# Patient Record
Sex: FEMALE | Race: WHITE | Employment: OTHER | URBAN - METROPOLITAN AREA
[De-identification: names, ages, dates, MRNs, and addresses within clinical notes are randomized per-mention and may not be internally consistent; named-entity substitution may affect disease eponyms.]

---

## 2017-01-12 DIAGNOSIS — I10 HYPERTENSION GOAL BP (BLOOD PRESSURE) < 140/90: ICD-10-CM

## 2017-01-12 DIAGNOSIS — R31.29 MICROSCOPIC HEMATURIA: ICD-10-CM

## 2017-01-12 DIAGNOSIS — E89.0 HISTORY OF PARTIAL THYROIDECTOMY: ICD-10-CM

## 2017-01-12 DIAGNOSIS — R77.1 HYPERGLOBULINEMIA: ICD-10-CM

## 2017-01-12 DIAGNOSIS — I63.50 RIGHT PONTINE CVA (H): ICD-10-CM

## 2017-01-12 DIAGNOSIS — R43.2 DYSGEUSIA: ICD-10-CM

## 2017-01-12 DIAGNOSIS — E03.9 ACQUIRED HYPOTHYROIDISM: ICD-10-CM

## 2017-01-12 DIAGNOSIS — E78.5 HYPERLIPIDEMIA LDL GOAL <100: ICD-10-CM

## 2017-01-12 LAB
ALBUMIN SERPL-MCNC: 3.5 G/DL (ref 3.4–5)
ALBUMIN UR-MCNC: NEGATIVE MG/DL
ALP SERPL-CCNC: 102 U/L (ref 40–150)
ALT SERPL W P-5'-P-CCNC: 23 U/L (ref 0–50)
ANION GAP SERPL CALCULATED.3IONS-SCNC: 9 MMOL/L (ref 3–14)
APPEARANCE UR: CLEAR
AST SERPL W P-5'-P-CCNC: 16 U/L (ref 0–45)
BASOPHILS # BLD AUTO: 0 10E9/L (ref 0–0.2)
BASOPHILS NFR BLD AUTO: 0.3 %
BILIRUB SERPL-MCNC: 0.4 MG/DL (ref 0.2–1.3)
BILIRUB UR QL STRIP: NEGATIVE
BUN SERPL-MCNC: 15 MG/DL (ref 7–30)
CALCIUM SERPL-MCNC: 9.5 MG/DL (ref 8.5–10.1)
CHLORIDE SERPL-SCNC: 107 MMOL/L (ref 94–109)
CHOLEST SERPL-MCNC: 156 MG/DL
CO2 SERPL-SCNC: 26 MMOL/L (ref 20–32)
COLOR UR AUTO: YELLOW
CREAT SERPL-MCNC: 0.69 MG/DL (ref 0.52–1.04)
CREAT UR-MCNC: 147 MG/DL
DIFFERENTIAL METHOD BLD: ABNORMAL
EOSINOPHIL # BLD AUTO: 0.1 10E9/L (ref 0–0.7)
EOSINOPHIL NFR BLD AUTO: 1.4 %
ERYTHROCYTE [DISTWIDTH] IN BLOOD BY AUTOMATED COUNT: 15.1 % (ref 10–15)
GFR SERPL CREATININE-BSD FRML MDRD: 82 ML/MIN/1.7M2
GLUCOSE SERPL-MCNC: 79 MG/DL (ref 70–99)
GLUCOSE UR STRIP-MCNC: NEGATIVE MG/DL
HCT VFR BLD AUTO: 45.1 % (ref 35–47)
HDLC SERPL-MCNC: 71 MG/DL
HGB BLD-MCNC: 14 G/DL (ref 11.7–15.7)
HGB UR QL STRIP: ABNORMAL
KETONES UR STRIP-MCNC: NEGATIVE MG/DL
LDLC SERPL CALC-MCNC: 74 MG/DL
LEUKOCYTE ESTERASE UR QL STRIP: NEGATIVE
LYMPHOCYTES # BLD AUTO: 1.2 10E9/L (ref 0.8–5.3)
LYMPHOCYTES NFR BLD AUTO: 34.7 %
MCH RBC QN AUTO: 28.6 PG (ref 26.5–33)
MCHC RBC AUTO-ENTMCNC: 31 G/DL (ref 31.5–36.5)
MCV RBC AUTO: 92 FL (ref 78–100)
MICROALBUMIN UR-MCNC: 18 MG/L
MICROALBUMIN/CREAT UR: 12.04 MG/G CR (ref 0–25)
MONOCYTES # BLD AUTO: 0.3 10E9/L (ref 0–1.3)
MONOCYTES NFR BLD AUTO: 8.5 %
NEUTROPHILS # BLD AUTO: 2 10E9/L (ref 1.6–8.3)
NEUTROPHILS NFR BLD AUTO: 55.1 %
NITRATE UR QL: NEGATIVE
NONHDLC SERPL-MCNC: 85 MG/DL
PH UR STRIP: 5.5 PH (ref 5–7)
PLATELET # BLD AUTO: 304 10E9/L (ref 150–450)
POTASSIUM SERPL-SCNC: 4.1 MMOL/L (ref 3.4–5.3)
PROT SERPL-MCNC: 8 G/DL (ref 6.8–8.8)
RBC # BLD AUTO: 4.9 10E12/L (ref 3.8–5.2)
RBC #/AREA URNS AUTO: ABNORMAL /HPF (ref 0–2)
SODIUM SERPL-SCNC: 142 MMOL/L (ref 133–144)
SP GR UR STRIP: >1.03 (ref 1–1.03)
TRIGL SERPL-MCNC: 56 MG/DL
TSH SERPL DL<=0.005 MIU/L-ACNC: 1.72 MU/L (ref 0.4–4)
URN SPEC COLLECT METH UR: ABNORMAL
UROBILINOGEN UR STRIP-ACNC: 0.2 EU/DL (ref 0.2–1)
WBC # BLD AUTO: 3.5 10E9/L (ref 4–11)
WBC #/AREA URNS AUTO: ABNORMAL /HPF (ref 0–2)

## 2017-01-12 PROCEDURE — 36415 COLL VENOUS BLD VENIPUNCTURE: CPT | Performed by: INTERNAL MEDICINE

## 2017-01-12 PROCEDURE — 82043 UR ALBUMIN QUANTITATIVE: CPT | Performed by: INTERNAL MEDICINE

## 2017-01-12 PROCEDURE — 80061 LIPID PANEL: CPT | Performed by: INTERNAL MEDICINE

## 2017-01-12 PROCEDURE — 80050 GENERAL HEALTH PANEL: CPT | Performed by: INTERNAL MEDICINE

## 2017-01-12 PROCEDURE — 81001 URINALYSIS AUTO W/SCOPE: CPT | Performed by: INTERNAL MEDICINE

## 2017-01-27 ENCOUNTER — OFFICE VISIT (OUTPATIENT)
Dept: FAMILY MEDICINE | Facility: CLINIC | Age: 77
End: 2017-01-27
Payer: COMMERCIAL

## 2017-01-27 DIAGNOSIS — R31.29 MICROSCOPIC HEMATURIA: ICD-10-CM

## 2017-01-27 DIAGNOSIS — N32.81 OAB (OVERACTIVE BLADDER): Primary | ICD-10-CM

## 2017-01-27 DIAGNOSIS — G31.84 MCI (MILD COGNITIVE IMPAIRMENT): ICD-10-CM

## 2017-01-27 DIAGNOSIS — G47.00 PERSISTENT INSOMNIA: ICD-10-CM

## 2017-01-27 PROCEDURE — 99214 OFFICE O/P EST MOD 30 MIN: CPT | Performed by: INTERNAL MEDICINE

## 2017-01-27 RX ORDER — OXYBUTYNIN CHLORIDE 10 MG/1
10 TABLET, EXTENDED RELEASE ORAL DAILY
Qty: 30 TABLET | Refills: 1 | Status: SHIPPED | OUTPATIENT
Start: 2017-01-27 | End: 2017-02-09

## 2017-01-27 NOTE — MR AVS SNAPSHOT
After Visit Summary   1/27/2017    Tana Caceres    MRN: [id number]           Patient Information     Date Of Birth          1940        Visit Information        Provider Department      1/27/2017 11:10 AM Manuel Taylor MD [facility]        Today's Diagnoses     OAB (overactive bladder)    -  1     Microscopic hematuria         Persistent insomnia         MCI (mild cognitive impairment)           Care Instructions    [facility]    If you have any questions regarding to your visit please contact your care team:       Team Red:   Clinic Hours Telephone Number   Dr. Maye Garzon, NP   7am-7pm  Monday - Thursday   7am-5pm  Fridays  [phone]  (Appointment scheduling available 24/7)    Questions about your visit?   Team Line  [phone]   Urgent Care - [place] and [facility] - 11am-9pm Monday-Friday Saturday-Sunday- 9am-5pm   [place] - 5pm-9pm Monday-Friday Saturday-Sunday- 9am-5pm  [phone] - [facility]  [phone] - [place]       What options do I have for visits at the clinic other than the traditional office visit?  To expand how we care for you, many of our providers are utilizing electronic visits (e-visits) and telephone visits, when medically appropriate, for interactions with their patients rather than a visit in the clinic.   We also offer nurse visits for many medical concerns. Just like any other service, we will bill your insurance company for this type of visit based on time spent on the phone with your provider. Not all insurance companies cover these visits. Please check with your medical insurance if this type of visit is covered. You will be responsible for any charges that are not paid by your insurance.      E-visits via Cenify:  generally incur a $35.00 fee.  Telephone visits:  Time spent on the phone: *charged based on time that is spent on the phone in increments of 10  minutes. Estimated cost:   5-10 mins $30.00   11-20 mins. $59.00   21-30 mins. $85.00     Use iMapDatahart (secure email communication and access to your chart) to send your primary care provider a message or make an appointment. Ask someone on your Team how to sign up for iCrackedt.  For a Price Quote for your services, please call our [facility] at [phone].      As always, Thank you for trusting us with your health care needs!  Wilmer Min          Follow-ups after your visit        Future tests that were ordered for you today     Open Future Orders        Priority Expected Expires Ordered    US Renal Complete Routine  1/27/2018 1/27/2017            Who to contact     If you have questions or need follow up information about today's clinic visit or your schedule please contact [facility] directly at [phone].  Normal or non-critical lab and imaging results will be communicated to you by iMapDatahart, letter or phone within 4 business days after the clinic has received the results. If you do not hear from us within 7 days, please contact the clinic through iCrackedt or phone. If you have a critical or abnormal lab result, we will notify you by phone as soon as possible.  Submit refill requests through Ubidyne or call your pharmacy and they will forward the refill request to us. Please allow 3 business days for your refill to be completed.          Additional Information About Your Visit        iMapDatahart Information     iCrackedt gives you secure access to your electronic health record. If you see a primary care provider, you can also send messages to your care team and make appointments. If you have questions, please call your primary care clinic.  If you do not have a primary care provider, please call [phone] and they will assist you.        Care EveryWhere ID     This is your Care EveryWhere ID. This could be used by other organizations to access your [facility]  records  [id number]         Blood Pressure from Last 3 Encounters:   12/22/16 112/70   11/17/16 153/86   04/09/15 121/77    Weight from Last 3 Encounters:   12/22/16 175 lb (79.379 kg)   11/17/16 175 lb (79.379 kg)   04/09/15 147 lb (66.679 kg)                 Today's Medication Changes          These changes are accurate as of: 1/27/17 11:57 AM.  If you have any questions, ask your nurse or doctor.               Start taking these medicines.        Dose/Directions    oxybutynin 10 MG 24 hr tablet   Commonly known as:  DITROPAN XL   Used for:  OAB (overactive bladder)   Started by:  Manuel Taylor MD        Dose:  10 mg   Take 1 tablet (10 mg) by mouth daily   Quantity:  30 tablet   Refills:  1            Where to get your medicines      These medications were sent to [id number] - [place] [place] [address]     Phone:  [phone]    - oxybutynin 10 MG 24 hr tablet             Primary Care Provider Office Phone # Fax #    [facility] [phone]       [address] [facility] [address]        Thank you!     Thank you for choosing [facility]  for your care. Our goal is always to provide you with excellent care. Hearing back from our patients is one way we can continue to improve our services. Please take a few minutes to complete the written survey that you may receive in the mail after your visit with us. Thank you!             Your Updated Medication List - Protect others around you: Learn how to safely use, store and throw away your medicines at www.disposemymeds.org.          This list is accurate as of: 1/27/17 11:57 AM.  Always use your most recent med list.                   Brand Name Dispense Instructions for use    * ACETAMINOPHEN PO      Take 500 mg by mouth every 6 hours as needed for pain       * acetaminophen 325 MG tablet    TYLENOL    100 tablet    Take 2 tablets (650 mg) by mouth every 6 hours as  needed for mild pain       amLODIPine 5 MG tablet    NORVASC     Take 5 mg by mouth       aspirin 81 MG tablet     30 tablet    Take 1 tablet (81 mg) by mouth daily       atorvastatin 20 MG tablet    LIPITOR     Take 20 mg by mouth       cholecalciferol 1000 UNIT tablet    vitamin D    90 tablet    Take 1 tablet (1,000 Units) by mouth daily       ENALAPRIL MALEATE PO      Take 20 mg by mouth       LEVOTHYROXINE SODIUM PO      Take 75 mcg by mouth daily       * order for DME     1 Device    Equipment being ordered: shower chair, with hand placement on seat       * order for DME     1 Device    Equipment being ordered: wall bars with screws, no suction       oxybutynin 10 MG 24 hr tablet    DITROPAN XL    30 tablet    Take 1 tablet (10 mg) by mouth daily       piroxicam 20 MG capsule    FELDENE    30 capsule    Take 1 capsule (20 mg) by mouth daily (with breakfast)       polyethylene glycol powder    MIRALAX    510 g    Take 17 g (1 capful) by mouth daily       WARFARIN SODIUM PO      Take 4 mg by mouth       * Notice:  This list has 4 medication(s) that are the same as other medications prescribed for you. Read the directions carefully, and ask your doctor or other care provider to review them with you.

## 2017-01-27 NOTE — PROGRESS NOTES
SUBJECTIVE:                                                    Tana Caceres is a 76 year old female who presents to clinic today for the following health issues:    Chief Complaint   Patient presents with     Labs Only     follow up for labs         OAB (overactive bladder)  Microscopic hematuria  Persistent insomnia  MCI (mild cognitive impairment)     Tana Caceres is a very pleasant elderly woman whom I have met and become a primary care physician for. She's generally in ok health. She's got some chronic health issues but these are fairly well managed. For complete details please see most recent previous office visit with me and review problem list which has been properly expanded with explanations of her known health problems . We also did comprehensive baseline laboratory studies last week and she is in today with somnolent to go over the results.    Results for orders placed or performed in visit on 01/12/17   CBC with platelets differential   Result Value Ref Range    WBC 3.5 (L) 4.0 - 11.0 10e9/L    RBC Count 4.90 3.8 - 5.2 10e12/L    Hemoglobin 14.0 11.7 - 15.7 g/dL    Hematocrit 45.1 35.0 - 47.0 %    MCV 92 78 - 100 fl    MCH 28.6 26.5 - 33.0 pg    MCHC 31.0 (L) 31.5 - 36.5 g/dL    RDW 15.1 (H) 10.0 - 15.0 %    Platelet Count 304 150 - 450 10e9/L    Diff Method Automated Method     % Neutrophils 55.1 %    % Lymphocytes 34.7 %    % Monocytes 8.5 %    % Eosinophils 1.4 %    % Basophils 0.3 %    Absolute Neutrophil 2.0 1.6 - 8.3 10e9/L    Absolute Lymphocytes 1.2 0.8 - 5.3 10e9/L    Absolute Monocytes 0.3 0.0 - 1.3 10e9/L    Absolute Eosinophils 0.1 0.0 - 0.7 10e9/L    Absolute Basophils 0.0 0.0 - 0.2 10e9/L   UA with Microscopic reflex to Culture   Result Value Ref Range    Color Urine Yellow     Appearance Urine Clear     Glucose Urine Negative NEG mg/dL    Bilirubin Urine Negative NEG    Ketones Urine Negative NEG mg/dL    Specific Gravity Urine >1.030 1.003 - 1.035    pH Urine 5.5 5.0 - 7.0 pH    Protein  Albumin Urine Negative NEG mg/dL    Urobilinogen Urine 0.2 0.2 - 1.0 EU/dL    Nitrite Urine Negative NEG    Blood Urine Moderate (A) NEG    Leukocyte Esterase Urine Negative NEG    Source Midstream Urine     WBC Urine O - 2 0 - 2 /HPF    RBC Urine 25-50 (A) 0 - 2 /HPF   Albumin Random Urine Quantitative   Result Value Ref Range    Creatinine Urine 147 mg/dL    Albumin Urine mg/L 18 mg/L    Albumin Urine mg/g Cr 12.04 0 - 25 mg/g Cr   Comprehensive metabolic panel   Result Value Ref Range    Sodium 142 133 - 144 mmol/L    Potassium 4.1 3.4 - 5.3 mmol/L    Chloride 107 94 - 109 mmol/L    Carbon Dioxide 26 20 - 32 mmol/L    Anion Gap 9 3 - 14 mmol/L    Glucose 79 70 - 99 mg/dL    Urea Nitrogen 15 7 - 30 mg/dL    Creatinine 0.69 0.52 - 1.04 mg/dL    GFR Estimate 82 >60 mL/min/1.7m2    GFR Estimate If Black >90   GFR Calc   >60 mL/min/1.7m2    Calcium 9.5 8.5 - 10.1 mg/dL    Bilirubin Total 0.4 0.2 - 1.3 mg/dL    Albumin 3.5 3.4 - 5.0 g/dL    Protein Total 8.0 6.8 - 8.8 g/dL    Alkaline Phosphatase 102 40 - 150 U/L    ALT 23 0 - 50 U/L    AST 16 0 - 45 U/L   Lipid panel reflex to direct LDL   Result Value Ref Range    Cholesterol 156 <200 mg/dL    Triglycerides 56 <150 mg/dL    HDL Cholesterol 71 >49 mg/dL    LDL Cholesterol Calculated 74 <100 mg/dL    Non HDL Cholesterol 85 <130 mg/dL   TSH with free T4 reflex   Result Value Ref Range    TSH 1.72 0.40 - 4.00 mU/L         2-3 events per night of nocturia   Wt Readings from Last 5 Encounters:   12/22/16 175 lb (79.379 kg)   11/17/16 175 lb (79.379 kg)   04/09/15 147 lb (66.679 kg)   12/11/14 165 lb (74.844 kg)   11/04/14 160 lb (72.576 kg)     We reviewed all her laboratory studies in significant detail  And there's really no significant issues here. A few nonspecific trivial variations from the normal ranges such as a white blood cell count of 3.5 and microscopic hematuria.     We discussed all issues with hiro who translates to the Romansh for me.  The main issues are as follows;    She is having some urinary incontinence , urinary urgency is an issue. This has been about a year and maybe a bit worsened. It's urinary urgency and some urinary incontinence . She's getting up 2-3 times at night in order to urinate. It's not diabetes mellitus and no pain with this. Sometimes while awake she can tell , the urge to urinate is coming on too fast to be normal and if she doesn't get to the bathroom in time she will have a urine accident.    Then she is status post one kidney from a nephrectomy , unilateral.     Problem list and histories reviewed & adjusted, as indicated.  Additional history: as documented    Patient Active Problem List   Diagnosis     Hypertension goal BP (blood pressure) < 140/90     DVT (deep venous thrombosis) (H)     Acquired hypothyroidism     PE (pulmonary embolism)     Factor V Leiden (H)     Unilateral sensorineural hearing loss     Mixed hearing loss     Osteoporosis     History of nephrectomy     Adrenal mass, left (H)     Right pontine CVA (H)     DO (dyspnea on exertion)     Primary osteoarthritis of knee     Morbid obesity (H)     Physical deconditioning     Dysgeusia     Cataract     LAP-BAND surgery status     Left adrenal mass (H)     Age-related osteoporosis without current pathological fracture     Hyperlipidemia LDL goal <100     Long term current use of anticoagulant therapy     Hyperglobulinemia     History of partial thyroidectomy     Microscopic hematuria     Persistent insomnia     MCI (mild cognitive impairment)     Past Surgical History   Procedure Laterality Date     Surgical history of -        R ear surgery     Cataract iol, rt/lt  8/14/2014     Procedure: EXTRACTION, CATARACT, WITH INTRAOCULAR LENS INSERTION, POSTERIOR CHAMBER; Laterality: Right; Surgeon: Eufemia Chatman MD; Service: Ophthalmology     Tympanoplasty  10/14/2014     Procedure: TYMPANOPLASTY; Laterality: Right; Surgeon: Stephen Abarca MD; Service:  Otolaryngology     Cataract iol, rt/lt  11/10/2014     Procedure: EXTRACTION, CATARACT, WITH INTRAOCULAR LENS INSERTION, POSTERIOR CHAMBER; Laterality: Left; Surgeon: Gulshan Pickering MD; Service: Ophthalmology     C partial excision thyroid,bilat       Nephrectomy  1980s     Xr lap band       uncertain       Social History   Substance Use Topics     Smoking status: Never Smoker      Smokeless tobacco: Never Used     Alcohol Use: No     Family History   Problem Relation Age of Onset     Hypertension Father      Hypertension Mother      Ovarian Cancer No family hx of      Cancer - colorectal No family hx of      Breast Cancer No family hx of      Hypertension Brother      Glaucoma Father      Glaucoma Brother          Current Outpatient Prescriptions   Medication Sig Dispense Refill     oxybutynin (DITROPAN XL) 10 MG 24 hr tablet Take 1 tablet (10 mg) by mouth daily 30 tablet 1     cholecalciferol (VITAMIN D) 1000 UNIT tablet Take 1 tablet (1,000 Units) by mouth daily 90 tablet 3     amLODIPine (NORVASC) 5 MG tablet Take 5 mg by mouth       atorvastatin (LIPITOR) 20 MG tablet Take 20 mg by mouth       aspirin 81 MG tablet Take 1 tablet (81 mg) by mouth daily 30 tablet 11     order for DME Equipment being ordered: shower chair, with hand placement on seat 1 Device 0     order for DME Equipment being ordered: wall bars with screws, no suction 1 Device 0     polyethylene glycol (MIRALAX) powder Take 17 g (1 capful) by mouth daily 510 g 1     piroxicam (FELDENE) 20 MG capsule Take 1 capsule (20 mg) by mouth daily (with breakfast) 30 capsule 1     LEVOTHYROXINE SODIUM PO Take 75 mcg by mouth daily       ENALAPRIL MALEATE PO Take 20 mg by mouth       ACETAMINOPHEN PO Take 500 mg by mouth every 6 hours as needed for pain       acetaminophen (TYLENOL) 325 MG tablet Take 2 tablets (650 mg) by mouth every 6 hours as needed for mild pain 100 tablet 1     WARFARIN SODIUM PO Take 4 mg by mouth        No Known Allergies  Recent  Labs   Lab Test  01/12/17   1240 02/09/15   LDL  74   --    HDL  71   --    TRIG  56   --    ALT  23   --    CR  0.69  0.64   GFRESTIMATED  82  96   GFRESTBLACK  >90   GFR Calc    116   POTASSIUM  4.1  5.0   TSH  1.72   --       BP Readings from Last 3 Encounters:   12/22/16 112/70   11/17/16 153/86   04/09/15 121/77    Wt Readings from Last 3 Encounters:   12/22/16 175 lb (79.379 kg)   11/17/16 175 lb (79.379 kg)   04/09/15 147 lb (66.679 kg)                  Labs reviewed in EPIC  Problem list, Medication list, Allergies, and Medical/Social/Surgical histories [facility] in [facility] and updated as appropriate.    ROS:  Constitutional, HEENT, cardiovascular, pulmonary, gi and gu systems are negative, except as otherwise noted.    OBJECTIVE:                                                    There were no vitals taken for this visit.  There is no weight on file to calculate BMI.  GENERAL APPEARANCE: healthy, alert and no distress  EYES: Eyes grossly normal to inspection, PERRL and conjunctivae and sclerae normal  RESP: lungs clear to auscultation - no rales, rhonchi or wheezes  CV: regular rates and rhythm, normal S1 S2, no S3 or S4 and no murmur, click or rub    Diagnostic test results:  Diagnostic Test Results::    As detailed above        ASSESSMENT/PLAN:                                                    1. OAB (overactive bladder)  My diagnosis is a clinical diagnosis based on history. There's enough evidence here to warrant an empirical trial of oxybutnin . Lets give it a solid 2 weeks and dependent upon her response top treatment we may try increasing the dose  - US Renal Complete; Future  - oxybutynin (DITROPAN XL) 10 MG 24 hr tablet; Take 1 tablet (10 mg) by mouth daily  Dispense: 30 tablet; Refill: 1    2. Microscopic hematuria  Although the evidence of a true pathological process is not overly convincing , I am worried enough to want to exclude bladder / kidney issues   - US Renal Complete;  Future    3. Persistent insomnia  They have tried Melatonin between 3 to 5 milligrams one hour before sleep without success. It may be that the urination is playing a major role here so lets wait and see if this helps things, if not we can try adding something like Desyrel (Trazodone)     4. MCI (mild cognitive impairment)  We had a significant conversation here. Patient does not have evidence of a dementia although I did not apply something like a formal [employer] Mental Status (UMS) Examination or 6 CIT dementia screen in Yoruba, her symptoms are more consistent with a mild cognitive impairment. This is something they are currently comfortable just to keep an eye on things       Follow up with Provider - 1-3 months      Manuel Taylor MD  [facility]    25 minutes was spent with the patient with greater than 50% in face-to-face discussion of disease process, treatment options and medicine management.

## 2017-01-27 NOTE — NURSING NOTE
"Chief Complaint   Patient presents with     Labs Only     follow up for labs        Initial There were no vitals taken for this visit. Estimated body mass index is 37.75 kg/(m^2) as calculated from the following:    Height as of 12/22/16: 4' 9.09\" (1.45 m).    Weight as of 12/22/16: 175 lb (79.379 kg).  BP completed using cuff size: vviiane Min      "

## 2017-01-27 NOTE — PATIENT INSTRUCTIONS
[facility]    If you have any questions regarding to your visit please contact your care team:       Team Red:   Clinic Hours Telephone Number   Dr. Maye Garzon, NP   7am-7pm  Monday - Thursday   7am-5pm  Fridays  [phone]  (Appointment scheduling available 24/7)    Questions about your visit?   Team Line  [phone]   Urgent Care - [place] - 11am-9pm Monday-Friday Saturday-Sunday- 9am-5pm   [place] - 5pm-9pm Monday-Friday Saturday-Sunday- 9am-5pm  [phone] - [place]   [phone] - [place]       What options do I have for visits at the clinic other than the traditional office visit?  To expand how we care for you, many of our providers are utilizing electronic visits (e-visits) and telephone visits, when medically appropriate, for interactions with their patients rather than a visit in the clinic.   We also offer nurse visits for many medical concerns. Just like any other service, we will bill your insurance company for this type of visit based on time spent on the phone with your provider. Not all insurance companies cover these visits. Please check with your medical insurance if this type of visit is covered. You will be responsible for any charges that are not paid by your insurance.      E-visits via Mevio:  generally incur a $35.00 fee.  Telephone visits:  Time spent on the phone: *charged based on time that is spent on the phone in increments of 10 minutes. Estimated cost:   5-10 mins $30.00   11-20 mins. $59.00   21-30 mins. $85.00     Use [email or web address] (secure email communication and access to your chart) to send your primary care provider a message or make an appointment. Ask someone on your Team how to sign up for Mevio.  For a Price Quote for your services, please call our Consumer Price Line at [phone].      As always, Thank you for trusting us with your health care needs!  Wilmer ESPINOZA  [place]

## 2017-02-03 ENCOUNTER — TELEPHONE (OUTPATIENT)
Dept: FAMILY MEDICINE | Facility: CLINIC | Age: 77
End: 2017-02-03

## 2017-02-03 DIAGNOSIS — I82.4Y9 DEEP VEIN THROMBOSIS (DVT) OF PROXIMAL LOWER EXTREMITY, UNSPECIFIED CHRONICITY, UNSPECIFIED LATERALITY (H): Primary | ICD-10-CM

## 2017-02-03 DIAGNOSIS — I27.82 OTHER CHRONIC PULMONARY EMBOLISM WITHOUT ACUTE COR PULMONALE (H): ICD-10-CM

## 2017-02-03 RX ORDER — WARFARIN SODIUM 5 MG/1
TABLET ORAL
COMMUNITY
Start: 2017-02-03 | End: 2017-07-06

## 2017-02-03 NOTE — TELEPHONE ENCOUNTER
HC nurse updated.      FYI:   Per HC nurse, patient is currently on warfarin 7.5mg on Sun and Wed, and 5mg ROW  Last INR was 2.3 on 1/23/17      Bennett Shaw RN

## 2017-02-03 NOTE — TELEPHONE ENCOUNTER
Reason for Call:  Other prescription and Order     Detailed comments: Alaina calling in INR: 2.5. Needing orders for dosing and next recheck date. Verbal order ok    Phone Number Patient can be reached at: Other phone number:  [phone]  *    Best Time: any time    Can we leave a detailed message on this number? YES    Call taken on 2/3/2017 at 3:54 PM by Katelin Sher

## 2017-02-03 NOTE — TELEPHONE ENCOUNTER
Continue same coumadin dose [ 7.5 Sunday, Wednesday , 5 milligram other days ]   Recheck in one month   Inr clinic referral     Manuel Taylor MD

## 2017-02-06 ENCOUNTER — RADIANT APPOINTMENT (OUTPATIENT)
Dept: ULTRASOUND IMAGING | Facility: CLINIC | Age: 77
End: 2017-02-06
Attending: INTERNAL MEDICINE
Payer: COMMERCIAL

## 2017-02-06 DIAGNOSIS — R31.29 MICROSCOPIC HEMATURIA: ICD-10-CM

## 2017-02-06 DIAGNOSIS — N32.81 OAB (OVERACTIVE BLADDER): ICD-10-CM

## 2017-02-06 PROCEDURE — 76770 US EXAM ABDO BACK WALL COMP: CPT

## 2017-02-08 ENCOUNTER — TELEPHONE (OUTPATIENT)
Dept: FAMILY MEDICINE | Facility: CLINIC | Age: 77
End: 2017-02-08

## 2017-02-08 DIAGNOSIS — N32.81 OAB (OVERACTIVE BLADDER): Primary | ICD-10-CM

## 2017-02-08 NOTE — TELEPHONE ENCOUNTER
Patient started oxybutynin (DITROPAN XL) 10 MG one tablet daily about 10 days ago. She has noticed a small improvement in symptom, but were hoping for more of a change. Patient is tolerating the medication well just wished it helped more. Is it possible to increase the medication or maybe change the medication? Please call to discuss. Patient uses the [facility] pharmacy in [place]./ Benita Godinez MA

## 2017-02-09 RX ORDER — OXYBUTYNIN CHLORIDE 10 MG/1
20 TABLET, EXTENDED RELEASE ORAL DAILY
Qty: 60 TABLET | Refills: 1 | Status: SHIPPED | OUTPATIENT
Start: 2017-02-09 | End: 2017-04-27

## 2017-02-09 NOTE — TELEPHONE ENCOUNTER
Prescription sent.    Left message on voicemail for patient to return call to RN hotline at # [phone].  Bennett Shaw RN

## 2017-02-09 NOTE — TELEPHONE ENCOUNTER
Yes, we can increase the medication. She can double up on the current 10 milligram dose and if after a week or two at this level we still have less then satisfactory success we can go to the 30 milligram which is the highest possible dose.    Manuel Taylor MD

## 2017-02-10 NOTE — TELEPHONE ENCOUNTER
Patient's son called back stating that he was the one that had this discussion with the MA.  He stated that med was effective but problem had not resolved.    Explained to him that dose can be increased per provider and new script was sent to the pharmacy  He verbalized understanding and will patient with the new dose.   Will see how patient does on increased dose      Bennett Shaw RN

## 2017-03-03 ENCOUNTER — TELEPHONE (OUTPATIENT)
Dept: FAMILY MEDICINE | Facility: CLINIC | Age: 77
End: 2017-03-03

## 2017-03-03 NOTE — TELEPHONE ENCOUNTER
Per 2/3/17 phone encounter    Patient was to continue current dose and recheck in 1 month.  She was taking warfarin 7.5mg on Sun and Wed, and 5mg ROW    Bennett Shaw RN

## 2017-03-03 NOTE — TELEPHONE ENCOUNTER
1. Patient does have an INR referral.  2. Patient will take 7.5mg on Sun, and 5mg ROW, recheck INR in 10 days (3/13/17)    Alaina updated with orders above      Bennett Shaw RN

## 2017-03-03 NOTE — TELEPHONE ENCOUNTER
Reason for Call:  INR    Who is calling?  Nursing Home: [facility]    Phone number:  [phone]    Fax number:      Name of caller: Alaina    INR Value:  3.8    Are there any other concerns:  Yes: Too high and patient had epistasis     Can we leave a detailed message on this number? YES    Phone number patient can be reached at: Other phone number:  [phone]      Call taken on 3/3/2017 at 4:36 PM by Marisabel Mcclendon

## 2017-03-03 NOTE — TELEPHONE ENCOUNTER
1. Make sure she does have a coherent INR plan of care ! If necessary we can place inr clinic enrollment orders  2. For current INR dosing , based on INR of 3.8 and current dosing, lets change to 5 milligrams 6/7 days 7.5 1 days per week and recheck in 10 days     Manuel Taylor MD

## 2017-03-13 ENCOUNTER — TELEPHONE (OUTPATIENT)
Dept: FAMILY MEDICINE | Facility: CLINIC | Age: 77
End: 2017-03-13

## 2017-03-13 ENCOUNTER — ANTICOAGULATION THERAPY VISIT (OUTPATIENT)
Dept: NURSING | Facility: CLINIC | Age: 77
End: 2017-03-13
Payer: COMMERCIAL

## 2017-03-13 DIAGNOSIS — I26.99 PULMONARY EMBOLISM WITH INFARCTION (H): ICD-10-CM

## 2017-03-13 DIAGNOSIS — Z79.01 LONG-TERM (CURRENT) USE OF ANTICOAGULANTS: ICD-10-CM

## 2017-03-13 LAB — INR PPP: 3.6

## 2017-03-13 PROCEDURE — 99207 ZZC NO CHARGE NURSE ONLY: CPT | Performed by: INTERNAL MEDICINE

## 2017-03-13 NOTE — MR AVS SNAPSHOT
Tana Ramosherb   3/13/2017   Anticoagulation Therapy Visit    Description:  76 year old female   Provider:  Manuel Taylor MD   Department:  [facility] Nurse           INR as of 3/13/2017     Today's INR 3.6!      Anticoagulation Summary as of 3/13/2017     INR goal 2.0-3.0   Today's INR 3.6!   Full instructions 3/13: 2.5 mg; 3/14: 5 mg; 3/15: 5 mg; 3/16: 5 mg; 3/17: 5 mg; 3/18: 5 mg; 3/19: 5 mg; Otherwise No maintenance plan   Next INR check 3/20/2017    Indications   DVT (deep venous thrombosis) (H) [I82.409]  Long-term (current) use of anticoagulants [Z79.01] [Z79.01]  Pulmonary embolism with infarction (H) [I26.99] [I26.99]         Contact Numbers     [facility]  Please call [phone] to cancel and/or reschedule your appointment   Please call [phone] with any problems or questions regarding your therapy.        March 2017 Details    Sun Mon Tue Wed Thu Fri Sat        1               2               3               4                 5               6               7               8               9               10               11                 12               13      2.5 mg   See details      14      5 mg         15      5 mg         16      5 mg         17      5 mg         18      5 mg           19      5 mg         20            21               22               23               24               25                 26               27               28               29               30               31                 Date Details   03/13 This INR check       Date of next INR:  3/20/2017         How to take your warfarin dose     To take:  2.5 mg Take 0.5 of a 5 mg tablet.    To take:  5 mg Take 1 of the 5 mg tablets.

## 2017-03-13 NOTE — PROGRESS NOTES
ANTICOAGULATION FOLLOW-UP CLINIC VISIT    Patient Name:  Tana Caceres  Date:  3/13/2017  Contact Type:  Telephone/ Keerthi    SUBJECTIVE:     Patient Findings     Positives Unexplained INR or factor level change    Comments S/O:  Keerthi from [facility] calls with patients INR today of 3.6.  Tana Caceres is on Coumadin for DVT.  Current Coumadin dose is 7.5 mg on Sundays and 5 mg ROW, weekly total: 37.5mg.   Concerns today are: None Noted.    A/P: Tana Caceres's INR is Supratherapeutic  for his/her goal range of 2 - 3.  Reasons why INR is out of range may include:unknown  Recommended to have Tana Caceres decrease Coumadin dose to 2.5mg Mondays and 5 mg ROW, weekly total: 32.5mg and to have his/her INR rechecked in 1 week on 3/20.      Marina Mohr RN - BC               OBJECTIVE    INR   Date Value Ref Range Status   03/13/2017 3.6  Final       ASSESSMENT / PLAN  INR assessment SUPRA    Recheck INR In: 1 WEEK    INR Location [facility]      Anticoagulation Summary as of 3/13/2017     INR goal 2.0-3.0   Today's INR 3.6!   Maintenance plan No maintenance plan   Full instructions 3/13: 2.5 mg; 3/14: 5 mg; 3/15: 5 mg; 3/16: 5 mg; 3/17: 5 mg; 3/18: 5 mg; 3/19: 5 mg; Otherwise No maintenance plan   Next INR check 3/20/2017   Target end date Indefinite    Indications   DVT (deep venous thrombosis) (H) [I82.409]  Long-term (current) use of anticoagulants [Z79.01] [Z79.01]  Pulmonary embolism with infarction (H) [I26.99] [I26.99]         Anticoagulation Episode Summary     INR check location     Preferred lab     Send INR reminders to [facility] CLINIC    Comments       Anticoagulation Care Providers     Provider Role Specialty Phone number    Manuel Taylor MD [facility] Internal Medicine [phone]            See the Encounter Report to view Anticoagulation Flowsheet and Dosing Calendar (Go to Encounters tab in chart review, and find the Anticoagulation Therapy Visit)    Dosage adjustment made based on  physician directed care plan.    Marina Mohr RN

## 2017-03-13 NOTE — TELEPHONE ENCOUNTER
Keerthi calling in an inr today.  3.6 today. Her dosage is 7.5 mg Sunday and 5 mg all the other days.

## 2017-03-13 NOTE — TELEPHONE ENCOUNTER
Patient had been referred to INR clinic.  Routing to INR clinic  See 3/3/17 phone encounter for previous INR results and dosing  Bennett Shaw RN

## 2017-03-20 ENCOUNTER — ANTICOAGULATION THERAPY VISIT (OUTPATIENT)
Dept: NURSING | Facility: CLINIC | Age: 77
End: 2017-03-20
Payer: COMMERCIAL

## 2017-03-20 ENCOUNTER — TELEPHONE (OUTPATIENT)
Dept: FAMILY MEDICINE | Facility: CLINIC | Age: 77
End: 2017-03-20

## 2017-03-20 DIAGNOSIS — I26.99 PULMONARY EMBOLISM WITH INFARCTION (H): ICD-10-CM

## 2017-03-20 DIAGNOSIS — Z79.01 LONG-TERM (CURRENT) USE OF ANTICOAGULANTS: ICD-10-CM

## 2017-03-20 LAB — INR PPP: 2.3

## 2017-03-20 PROCEDURE — 99207 ZZC NO CHARGE NURSE ONLY: CPT | Performed by: INTERNAL MEDICINE

## 2017-03-20 NOTE — PROGRESS NOTES
ANTICOAGULATION FOLLOW-UP CLINIC VISIT    Patient Name:  Tana Caceres  Date:  3/20/2017  Contact Type:  Telephone    SUBJECTIVE:     Patient Findings     Positives No Problem Findings           OBJECTIVE    INR   Date Value Ref Range Status   03/20/2017 2.3  Final       ASSESSMENT / PLAN  INR assessment THER    Recheck INR In: 1 WEEK    [facility]R Location [facility]      Anticoagulation Summary as of 3/20/2017     INR goal 2.0-3.0   Today's INR 2.3   Maintenance plan 5 mg (5 mg x 1) every day   Full instructions 5 mg every day   Weekly total 35 mg   Plan last modified Sadaf Michele RN (3/20/2017)   Next INR check 3/27/2017   Target end date Indefinite    Indications   DVT (deep venous thrombosis) (H) [I82.409]  Long-term (current) use of anticoagulants [Z79.01] [Z79.01]  Pulmonary embolism with infarction (H) [I26.99] [I26.99]         Anticoagulation Episode Summary     INR check location     Preferred lab     Send INR reminders to [facility] CL[facility]    Comments       Anticoagulation Care Providers     Provider Role Specialty Phone number    Manuel Taylor MD Responsible Internal Medicine [phone]            See the Encounter Report to view Anticoagulation Flowsheet and Dosing Calendar (Go to Encounters tab in chart review, and find the Anticoagulation Therapy Visit)    Dosage adjustment made based on physician directed care plan. Reviewed both bleeding and clotting signs and symptoms with patient this visit. Pt. has no further questions or concerns.  Will call with any changes to diet, medications, or missed doses at INR line [phone].      Sadaf Michele, IKE

## 2017-03-20 NOTE — TELEPHONE ENCOUNTER
Reason for Call:  Other Order/INR    Detailed comments: Nurse calling to report INR: 2.3. Patient taking 2.5MG on Thursdays, 5MG all other days of the week. Need ok for new orders and when to recheck    Phone Number Patient can be reached at: Other phone number:  [phone]    Best Time: any time    Can we leave a detailed message on this number? YES    Call taken on 3/20/2017 at 11:59 AM by Katelin Sher

## 2017-03-20 NOTE — MR AVS SNAPSHOT
Tana Ramosherb   3/20/2017   Anticoagulation Therapy Visit    Description:  76 year old female   Provider:  Manuel Taylor MD   Department:  [facility] Nurse           INR as of 3/20/2017     Today's INR 2.3      Anticoagulation Summary as of 3/20/2017     INR goal 2.0-3.0   Today's INR 2.3   Full instructions 5 mg every day   Next INR check 3/27/2017    Indications   DVT (deep venous thrombosis) (H) [I82.409]  Long-term (current) use of anticoagulants [Z79.01] [Z79.01]  Pulmonary embolism with infarction (H) [I26.99] [I26.99]         Contact Numbers     [facility]  Please call [phone] to cancel and/or reschedule your appointment   Please call [phone] with any problems or questions regarding your therapy.        March 2017 Details    Sun Mon Tue Wed Thu Fri Sat        1               2               3               4                 5               6               7               8               9               10               11                 12               13               14               15               16               17               18                 19               20      5 mg   See details      21      5 mg         22      5 mg         23      5 mg         24      5 mg         25      5 mg           26      5 mg         27            28               29               30               31                 Date Details   03/20 This INR check       Date of next INR:  3/27/2017         How to take your warfarin dose     To take:  5 mg Take 1 of the 5 mg tablets.

## 2017-03-27 ENCOUNTER — ANTICOAGULATION THERAPY VISIT (OUTPATIENT)
Dept: NURSING | Facility: CLINIC | Age: 77
End: 2017-03-27
Payer: COMMERCIAL

## 2017-03-27 ENCOUNTER — TELEPHONE (OUTPATIENT)
Dept: FAMILY MEDICINE | Facility: CLINIC | Age: 77
End: 2017-03-27

## 2017-03-27 DIAGNOSIS — I26.99 PULMONARY EMBOLISM WITH INFARCTION (H): ICD-10-CM

## 2017-03-27 DIAGNOSIS — Z79.01 LONG-TERM (CURRENT) USE OF ANTICOAGULANTS: ICD-10-CM

## 2017-03-27 LAB — INR PPP: 2.8

## 2017-03-27 PROCEDURE — G0250 MD INR TEST REVIE INTER MGMT: HCPCS | Performed by: INTERNAL MEDICINE

## 2017-03-27 NOTE — TELEPHONE ENCOUNTER
Reason for Call: Request for an order or referral:    Order or referral being requested: Nurse calling to report INR: 2.8 today. Patient currently taking 5MG daily. Calling new orders and when to draw next    Date needed: as soon as possible    Has the patient been seen by the PCP for this problem? Not Applicable    Additional comments: NA    Phone number Patient can be reached at:  Other phone number:  [phone]    Best Time:  Any time    Can we leave a detailed message on this number?  YES    Call taken on 3/27/2017 at 12:32 PM by Katelin Sher

## 2017-03-27 NOTE — MR AVS SNAPSHOT
Tana Caceres   3/27/2017   Anticoagulation Therapy Visit    Description:  76 year old female   Provider:  Manuel Taylor MD   Department:  [facility] Nurse           INR as of 3/27/2017     Today's INR 2.8      Anticoagulation Summary as of 3/27/2017     INR goal 2.0-3.0   Today's INR 2.8   Full instructions 5 mg every day   Next INR check 4/10/2017    Indications   DVT (deep venous thrombosis) (H) [I82.409]  Long-term (current) use of anticoagulants [Z79.01] [Z79.01]  Pulmonary embolism with infarction (H) [I26.99] [I26.99]         Contact Numbers     [facility]  Please call [phone] to cancel and/or reschedule your appointment   Please call [phone] with any problems or questions regarding your therapy.        March 2017 Details    Sun Mon Tue Wed Thu Fri Sat        1               2               3               4                 5               6               7               8               9               10               11                 12               13               14               15               16               17               18                 19               20               21               22               23               24               25                 26               27      5 mg   See details      28      5 mg         29      5 mg         30      5 mg         31      5 mg           Date Details   03/27 This INR check               How to take your warfarin dose     To take:  5 mg Take 1 of the 5 mg tablets.           April 2017 Details    Sun Mon Tue Wed Thu Fri Sat           1      5 mg           2      5 mg         3      5 mg         4      5 mg         5      5 mg         6      5 mg         7      5 mg         8      5 mg           9      5 mg         10            11               12               13               14               15                 16               17               18               19               20               21               22                  23               24               25               26               27               28               29                 30                      Date Details   No additional details    Date of next INR:  4/10/2017         How to take your warfarin dose     To take:  5 mg Take 1 of the 5 mg tablets.

## 2017-03-27 NOTE — PROGRESS NOTES
ANTICOAGULATION FOLLOW-UP CLINIC VISIT    Patient Name:  Tana Caceres  Date:  3/27/2017  Contact Type:  Telephone    SUBJECTIVE:     Patient Findings     Positives No Problem Findings           OBJECTIVE    INR   Date Value Ref Range Status   03/27/2017 2.8  Final       ASSESSMENT / PLAN  INR assessment THER    Recheck INR In: 2 WEEKS    [facility] Location [facility]      Anticoagulation Summary as of 3/27/2017     INR goal 2.0-3.0   Today's INR 2.8   Maintenance plan 5 mg (5 mg x 1) every day   Full instructions 5 mg every day   Weekly total 35 mg   No change documented Sadaf Michele RN   Plan last modified Sadaf Michele RN (3/20/2017)   Next INR check 4/10/2017   Target end date Indefinite    Indications   DVT (deep venous thrombosis) (H) [I82.409]  Long-term (current) use of anticoagulants [Z79.01] [Z79.01]  Pulmonary embolism with infarction (H) [I26.99] [I26.99]         Anticoagulation Episode Summary     INR check location     Preferred lab     Send INR reminders to [facility] CL[facility]    Comments       Anticoagulation Care Providers     Provider Role Specialty Phone number    Manuel Taylor MD [facility] Internal Medicine [phone]            See the Encounter Report to view Anticoagulation Flowsheet and Dosing Calendar (Go to Encounters tab in chart review, and find the Anticoagulation Therapy Visit)    Dosage adjustment made based on physician directed care plan. Reviewed both bleeding and clotting signs and symptoms with patient this visit. Pt. has no further questions or concerns.  Will call with any changes to diet, medications, or missed doses at INR line [phone].      Sadaf Michele RN

## 2017-04-11 ENCOUNTER — TELEPHONE (OUTPATIENT)
Dept: INTERNAL MEDICINE | Facility: CLINIC | Age: 77
End: 2017-04-11

## 2017-04-11 NOTE — TELEPHONE ENCOUNTER
Reason for Call:  INR    Who is calling?  Nursing Home: [facility]    Phone number:  [phone]    Fax number:  NA    Name of caller: Scooby    INR Value:  NA    Are there any other concerns:   Wondering if ok to draw the INR next week instead of this week.     Can we leave a detailed message on this number? YES    Phone number patient can be reached at: Home number on file [phone] (home)      Call taken on 4/11/2017 at 3:52 PM by Marianne Medley

## 2017-04-18 ENCOUNTER — TELEPHONE (OUTPATIENT)
Dept: FAMILY MEDICINE | Facility: CLINIC | Age: 77
End: 2017-04-18

## 2017-04-18 DIAGNOSIS — Z79.01 LONG-TERM (CURRENT) USE OF ANTICOAGULANTS: Primary | ICD-10-CM

## 2017-04-18 DIAGNOSIS — M17.0 PRIMARY OSTEOARTHRITIS OF BOTH KNEES: ICD-10-CM

## 2017-04-18 DIAGNOSIS — I27.82 OTHER CHRONIC PULMONARY EMBOLISM WITHOUT ACUTE COR PULMONALE (H): ICD-10-CM

## 2017-04-18 DIAGNOSIS — I63.50 RIGHT PONTINE CVA (H): ICD-10-CM

## 2017-04-18 DIAGNOSIS — G31.84 MCI (MILD COGNITIVE IMPAIRMENT): ICD-10-CM

## 2017-04-18 DIAGNOSIS — R53.81 PHYSICAL DECONDITIONING: ICD-10-CM

## 2017-04-18 NOTE — TELEPHONE ENCOUNTER
Reason for Call:  Home care/nursing services    Detailed comments: Son is calling, please call to discuss home care services.    Phone Number Patient can be reached at: Home number on file [phone] (home)    Best Time: anytime today    Can we leave a detailed message on this number? YES    Call taken on 4/18/2017 at 8:06 AM by Tere Torres

## 2017-04-18 NOTE — TELEPHONE ENCOUNTER
Per Sotero, patient is currently receiving HC services including INR checks from an outside agency ([facility])  He would like to request that she get transferred to  HC    Routing to Dr. Taylor to please place referral if appropriate.    Contacted Gita VILLAN with [facility]), she stated that she is not working today but will take down the message for patient's RN CM ([facility] RN CM)  to contact the RN hotline  She will ask RN CM to discuss the process of discontinuing cares with [facility] and transferring cares to     Please contact Sotero back with updates and next step.    Bennett Shaw, IKE

## 2017-04-18 NOTE — TELEPHONE ENCOUNTER
Alaina called back and LM on Rn hotline.  Please call her back at [phone]    Bennett Shaw RN

## 2017-04-18 NOTE — TELEPHONE ENCOUNTER
Left message on voicemail for patient's son, Sotero, to return call to RN hotline at # [phone].  Bennett Shaw RN

## 2017-04-19 NOTE — TELEPHONE ENCOUNTER
Spoke with Alaina with [facility].  She stated that son had contacted them and asked that they discontinue HC    Called son and he stated that [facility] has reached out to them and should be calling back later this week.    Advised that he have patient come into the INR clinic to have her INR rechecked by Friday this week if [facility] cannot come out to start cares and INR checks before then  He agreed    Bennett Shaw RN

## 2017-04-20 ENCOUNTER — ANTICOAGULATION THERAPY VISIT (OUTPATIENT)
Dept: NURSING | Facility: CLINIC | Age: 77
End: 2017-04-20
Payer: COMMERCIAL

## 2017-04-20 DIAGNOSIS — I26.99 PULMONARY EMBOLISM WITH INFARCTION (H): ICD-10-CM

## 2017-04-20 DIAGNOSIS — Z53.9 DIAGNOSIS NOT YET DEFINED: Primary | ICD-10-CM

## 2017-04-20 DIAGNOSIS — I82.409 DVT (DEEP VENOUS THROMBOSIS) (H): ICD-10-CM

## 2017-04-20 DIAGNOSIS — Z79.01 LONG-TERM (CURRENT) USE OF ANTICOAGULANTS: ICD-10-CM

## 2017-04-20 LAB — INR PPP: 1.8

## 2017-04-20 PROCEDURE — G0179 MD RECERTIFICATION HHA PT: HCPCS | Performed by: INTERNAL MEDICINE

## 2017-04-20 PROCEDURE — 99207 ZZC NO CHARGE NURSE ONLY: CPT | Performed by: INTERNAL MEDICINE

## 2017-04-20 NOTE — TELEPHONE ENCOUNTER
Bianca from  [facility] calling to get orders to check INR today. Verbal orders given    Marina Mohr RN - BC

## 2017-04-20 NOTE — PROGRESS NOTES
ANTICOAGULATION FOLLOW-UP CLINIC VISIT    Patient Name:  Tana Caceres  Date:  4/20/2017  Contact Type:  Telephone/ Bianca    SUBJECTIVE:     Patient Findings     Positives Missed doses, No Problem Findings    Comments S/O:  Bianca from  home care calls with patients INR today of 1.8.  Tana Caceres is on Coumadin for PE.  Current Coumadin dose is 5 mg daily.   Concerns today are: None Noted.    A/P: Tana Caceres's INR is Subtherapeutic  for his/her goal range of 2 - 3.  Reasons why INR is out of range may include:missed dose  Recommended to have Tana Caceres remain on the same Coumadin dose and to have his/her INR rechecked in 1 week on 4/27.      Marina Mohr RN - BC               OBJECTIVE    INR   Date Value Ref Range Status   04/20/2017 1.8  Final       ASSESSMENT / PLAN  No question data found.  Anticoagulation Summary as of 4/20/2017     INR goal 2.0-3.0   Today's INR 1.8!   Maintenance plan 5 mg (5 mg x 1) every day   Full instructions 5 mg every day   Weekly total 35 mg   Plan last modified Sadfa Michele RN (3/20/2017)   Next INR check 4/27/2017   Target end date Indefinite    Indications   DVT (deep venous thrombosis) (H) [I82.409]  Long-term (current) use of anticoagulants [Z79.01] [Z79.01]  Pulmonary embolism with infarction (H) [I26.99] [I26.99]         Anticoagulation Episode Summary     INR check location     Preferred lab     Send INR reminders to [facility] CL[facility]    Comments       Anticoagulation Care Providers     Provider Role Specialty Phone number    Manuel Taylor MD [facility] Internal Medicine [phone]            See the Encounter Report to view Anticoagulation Flowsheet and Dosing Calendar (Go to Encounters tab in chart review, and find the Anticoagulation Therapy Visit)    Dosage adjustment made based on physician directed care plan.    Marina Mohr RN

## 2017-04-20 NOTE — MR AVS SNAPSHOT
Tana Ramosherb   4/20/2017   Anticoagulation Therapy Visit    Description:  76 year old female   Provider:  Manuel Taylor MD   Department:  F[facility] Nurse           INR as of 4/20/2017     Today's INR 1.8!      Anticoagulation Summary as of 4/20/2017     INR goal 2.0-3.0   Today's INR 1.8!   Full instructions 5 mg every day   Next INR check 4/27/2017    Indications   DVT (deep venous thrombosis) (H) [I82.409]  Long-term (current) use of anticoagulants [Z79.01] [Z79.01]  Pulmonary embolism with infarction (H) [I26.99] [I26.99]         Your next Anticoagulation Clinic appointment(s)     Apr 21, 2017  8:45 AM CDT   Anticoagulation Visit with [facility]Z ANTI COAG   [facility] ([address] [address] [phone]              Contact Numbers     [facility]  Please call [phone] to cancel and/or reschedule your appointment   Please call [phone] with any problems or questions regarding your therapy.        April 2017 Details    Sun Mon Tue Wed Thu Fri Sat           1                 2               3               4               5               6               7               8                 9               10               11               12               13               14               15                 16               17               18               19               20      5 mg   See details      21      5 mg         22      5 mg           23      5 mg         24      5 mg         25      5 mg         26      5 mg         27            28               29                 30                      Date Details   04/20 This INR check       Date of next INR:  4/27/2017         How to take your warfarin dose     To take:  5 mg Take 1 of the 5 mg tablets.

## 2017-04-27 ENCOUNTER — ANTICOAGULATION THERAPY VISIT (OUTPATIENT)
Dept: NURSING | Facility: CLINIC | Age: 77
End: 2017-04-27
Payer: COMMERCIAL

## 2017-04-27 DIAGNOSIS — Z79.01 LONG-TERM (CURRENT) USE OF ANTICOAGULANTS: ICD-10-CM

## 2017-04-27 DIAGNOSIS — N32.81 OAB (OVERACTIVE BLADDER): ICD-10-CM

## 2017-04-27 DIAGNOSIS — I26.99 PULMONARY EMBOLISM WITH INFARCTION (H): ICD-10-CM

## 2017-04-27 LAB — INR PPP: 2.2

## 2017-04-27 PROCEDURE — 99207 ZZC NO CHARGE NURSE ONLY: CPT | Performed by: INTERNAL MEDICINE

## 2017-04-27 RX ORDER — OXYBUTYNIN CHLORIDE 10 MG/1
TABLET, EXTENDED RELEASE ORAL
Qty: 60 TABLET | Refills: 1 | Status: SHIPPED | OUTPATIENT
Start: 2017-04-27 | End: 2017-07-05

## 2017-04-27 NOTE — TELEPHONE ENCOUNTER
Prescription approved per [facility] Refill Protocol.  Marina Mohr, RN - BC

## 2017-04-27 NOTE — TELEPHONE ENCOUNTER
oxybutynin (DITROPAN XL) 10 MG 24 hr tablet      Last Written Prescription Date: 2/9/17  Last Fill Quantity: 60,  # refills: 1   Last Office Visit with FMANAY, [facility]RETCHENP or [facility] prescribing provider: 1/27/17

## 2017-04-27 NOTE — MR AVS SNAPSHOT
Tana Ramosherb   4/27/2017   Anticoagulation Therapy Visit    Description:  76 year old female   Provider:  Manuel Taylor MD   Department:  Fz Nurse           INR as of 4/27/2017     Today's INR 2.2      Anticoagulation Summary as of 4/27/2017     INR goal 2.0-3.0   Today's INR 2.2   Full instructions 5 mg every day   Next INR check 5/4/2017    Indications   DVT (deep venous thrombosis) (H) [I82.409]  Long-term (current) use of anticoagulants [Z79.01] [Z79.01]  Pulmonary embolism with infarction (H) [I26.99] [I26.99]         Contact Numbers     [facility]  Please call [phone] to cancel and/or reschedule your appointment   Please call [phone] with any problems or questions regarding your therapy.        April 2017 Details    Sun Mon Tue Wed Thu Fri Sat           1                 2               3               4               5               6               7               8                 9               10               11               12               13               14               15                 16               17               18               19               20               21               22                 23               24               25               26               27      5 mg   See details      28      5 mg         29      5 mg           30      5 mg                Date Details   04/27 This INR check               How to take your warfarin dose     To take:  5 mg Take 1 of the 5 mg tablets.           May 2017 Details    Sun Mon Tue Wed Thu Fri Sat      1      5 mg         2      5 mg         3      5 mg         4            5               6                 7               8               9               10               11               12               13                 14               15               16               17               18               19               20                 21               22               23               24               25                26               27                 28               29               30               31                   Date Details   No additional details    Date of next INR:  5/4/2017         How to take your warfarin dose     To take:  5 mg Take 1 of the 5 mg tablets.

## 2017-04-28 ENCOUNTER — CARE COORDINATION (OUTPATIENT)
Dept: CARE COORDINATION | Facility: CLINIC | Age: 77
End: 2017-04-28

## 2017-04-28 NOTE — LETTER
Medical Emergency:  911  [facility]:  Primary Care: [phone]      Specialty Care: [phone]  Medical/Specialty Appointment Line: [phone])  24 hour Nurse Line:    [phone]  Crisis line: [phone]  Care Coordination:                  Carmela Garrett RN - [phone]  Maycol WEAVERW--[phone]    Feel free to contact your Care Coordinator team for further assistance. Below you will find resource numbers that you might find helpful.     COMMUNITY RESOURCE LINE:  [facility] 2-1-1     211 from land line  [phone]  [facility] Linkage Line     [phone]    [facility] PHONE NUMBER(S):  [facility]     [phone]    PRIVATE PAY HOME CARE:  [facility]     [phone]  Home Health Care [facility].    [phone]  [facility]    [phone]

## 2017-04-28 NOTE — LETTER
My Access Plan    Presenting Problem Signs and Symptoms Treatment Plan    Questions or concerns during clinic hours    I will call the clinic directly:                     [facility]    [address]               [place] [address]                  [phone]       Questions or concerns outside clinic hours    I will call the 24 hour nurse line at [phone] or 149-[place]    Patient needs to schedule an appointment    I will call the 24 hour scheduling team at [phone] or clinic directly at [phone]    Same day treatment     I will call the clinic first, nurse line if after hours, urgent care and express care if needed   Clinic Care Coordinators (RN/Social Work):            [facility]      Carmela Garrett RN BSN N  [phone]    ALFREDO Prado W [phone]   Crisis Services: Behavioral or Mental Health    [facility] (Behavioral Health Providers) [phone]    [facility] [phone]    [facility] [phone]    Crisis Connection (24/7): [phone]       Emergency treatment--Immediately    [facility] [address]

## 2017-04-28 NOTE — PROGRESS NOTES
Clinic Care Coordination Contact  [facility]/Voicemail    Referral Source: PCP  Clinical Data: Care Coordinator Outreach  Outreach attempted x 1.  Left message on voicemail with call back information and requested return call.  Plan: Care Coordinator will mail out care coordination introduction letter with care coordinator contact information and explanation of care coordination services. Care Coordinator will try to reach patient again in 1-2 business days.    NANCIE Prado  Care Coordination  [place]  [phone]  [email or web address]

## 2017-04-28 NOTE — LETTER
[facility]  [address] [place] [address]  [phone]      April 28, 2017      Tana Caceres  [address] [place] [address]        Dear Tana,    I am a SW Care Coordinator, working with the care team at your clinic including your primary care provider,Manuel Taylor.  I have been trying to reach you to introduce you to [facility] Care Coordination Program. The Care Coordinator is a nurse or  who understands the health care system. The goal of Care Coordination is to help you manage your health and improve access to the [facility] system in the most efficient manner.      The registered nurse (RN) assists you in meeting your health care goals by providing education, coordinating services, and strengthening the communication among your providers. The  (SW) is available to assist in financial, behavioral, psychosocial, and chemical dependency and counseling/psychiatric resources.     Please feel free to contact me at [phone]. I look forward to your call and partnering with you to achieve your optimal state of wellness.  We at [facility] are focused on providing you with the highest-quality healthcare experience possible and that all starts with you.       Sincerely,         Maycol Rivera, NANCIE  Care Coordination  [facility] [place]  [phone]  [email or web address]

## 2017-05-04 ENCOUNTER — ANTICOAGULATION THERAPY VISIT (OUTPATIENT)
Dept: NURSING | Facility: CLINIC | Age: 77
End: 2017-05-04
Payer: COMMERCIAL

## 2017-05-04 DIAGNOSIS — I26.99 PULMONARY EMBOLISM WITH INFARCTION (H): ICD-10-CM

## 2017-05-04 DIAGNOSIS — Z79.01 LONG-TERM (CURRENT) USE OF ANTICOAGULANTS: ICD-10-CM

## 2017-05-04 DIAGNOSIS — I82.409 DVT (DEEP VENOUS THROMBOSIS) (H): ICD-10-CM

## 2017-05-04 LAB — INR PPP: 2.6

## 2017-05-04 PROCEDURE — 99207 ZZC NO CHARGE NURSE ONLY: CPT | Performed by: INTERNAL MEDICINE

## 2017-05-04 NOTE — PROGRESS NOTES
ANTICOAGULATION FOLLOW-UP CLINIC VISIT    Patient Name:  Tana Caceres  Date:  5/4/2017  Contact Type:  Telephone/ Karine [phone]    SUBJECTIVE:     Patient Findings     Positives No Problem Findings    Comments S/O:  Karine from  home care calls with patients INR today of 2.6.  Tana Caceres is on Coumadin for PE.  Current Coumadin dose is 5 mg daily.   Concerns today are: None Noted.    A/P: Tana Caceres's INR is Therapeutic  for his/her goal range of 2 - 3.  Reasons why INR is out of range may include:na  Recommended to have Tana Caceres remain on the same Coumadin dose and to have his/her INR rechecked in 1 week on 5/11.      Marina Mohr RN - BC               OBJECTIVE    INR   Date Value Ref Range Status   05/04/2017 2.6  Final       ASSESSMENT / PLAN  No question data found.  Anticoagulation Summary as of 5/4/2017     INR goal 2.0-3.0   Today's INR 2.6   Maintenance plan 5 mg (5 mg x 1) every day   Full instructions 5 mg every day   Weekly total 35 mg   No change documented Marina Mohr RN   Plan last modified Sadaf Michele RN (3/20/2017)   Next INR check 5/11/2017   Target end date Indefinite    Indications   DVT (deep venous thrombosis) (H) [I82.409]  Long-term (current) use of anticoagulants [Z79.01] [Z79.01]  Pulmonary embolism with infarction (H) [I26.99] [I26.99]         Anticoagulation Episode Summary     INR check location     Preferred lab     Send [facility] reminders to [facility]    Comments       Anticoagulation Care Providers     Provider Role Specialty Phone number    Manuel Taylor MD [facility] Internal Medicine [phone]            See the Encounter Report to view Anticoagulation Flowsheet and Dosing Calendar (Go to Encounters tab in chart review, and find the Anticoagulation Therapy Visit)    RN reviewed patient's weekly warfarin dose. Pt INR remains within therapeutic range. Pt will continue with current dosing and monitoring as planned, based on physician directed care  plan.      Marina Mohr RN

## 2017-05-04 NOTE — MR AVS SNAPSHOT
Tana Ramosherb   5/4/2017   Anticoagulation Therapy Visit    Description:  76 year old female   Provider:  Manuel Taylor MD   Department:  [facility] Nurse           INR as of 5/4/2017     Today's INR 2.6      Anticoagulation Summary as of 5/4/2017     INR goal 2.0-3.0   Today's INR 2.6   Full instructions 5 mg every day   Next INR check 5/11/2017    Indications   DVT (deep venous thrombosis) (H) [I82.409]  Long-term (current) use of anticoagulants [Z79.01] [Z79.01]  Pulmonary embolism with infarction (H) [I26.99] [I26.99]         Contact Numbers     [facility]  Please call [phone] to cancel and/or reschedule your appointment   Please call [phone] with any problems or questions regarding your therapy.        May 2017 Details    Sun Mon Tue Wed Thu Fri Sat      1               2               3               4      5 mg   See details      5      5 mg         6      5 mg           7      5 mg         8      5 mg         9      5 mg         10      5 mg         11            12               13                 14               15               16               17               18               19               20                 21               22               23               24               25               26               27                 28               29               30               31                   Date Details   05/04 This INR check       Date of next INR:  5/11/2017         How to take your warfarin dose     To take:  5 mg Take 1 of the 5 mg tablets.

## 2017-05-11 ENCOUNTER — ANTICOAGULATION THERAPY VISIT (OUTPATIENT)
Dept: NURSING | Facility: CLINIC | Age: 77
End: 2017-05-11
Payer: COMMERCIAL

## 2017-05-11 DIAGNOSIS — I26.99 PULMONARY EMBOLISM WITH INFARCTION (H): ICD-10-CM

## 2017-05-11 DIAGNOSIS — I82.409 DVT (DEEP VENOUS THROMBOSIS) (H): ICD-10-CM

## 2017-05-11 DIAGNOSIS — Z79.01 LONG-TERM (CURRENT) USE OF ANTICOAGULANTS: ICD-10-CM

## 2017-05-11 LAB — INR POINT OF CARE: 2.2 (ref 0.86–1.14)

## 2017-05-11 PROCEDURE — 36416 COLLJ CAPILLARY BLOOD SPEC: CPT | Performed by: INTERNAL MEDICINE

## 2017-05-11 PROCEDURE — 99207 ZZC NO CHARGE NURSE ONLY: CPT | Performed by: INTERNAL MEDICINE

## 2017-05-11 PROCEDURE — 85610 PROTHROMBIN TIME: CPT | Mod: QW | Performed by: INTERNAL MEDICINE

## 2017-05-11 NOTE — MR AVS SNAPSHOT
Tana Ramosherb   5/11/2017   Anticoagulation Therapy Visit    Description:  76 year old female   Provider:  Manuel Taylor MD   Department:  [facility] Nurse           INR as of 5/11/2017     Today's INR 2.2      Anticoagulation Summary as of 5/11/2017     INR goal 2.0-3.0   Today's INR 2.2   Full instructions 5 mg every day   Next INR check 5/18/2017    Indications   DVT (deep venous thrombosis) (H) [I82.409]  Long-term (current) use of anticoagulants [Z79.01] [Z79.01]  Pulmonary embolism with infarction (H) [I26.99] [I26.99]         Contact Numbers     [facility]  Please call [phone] to cancel and/or reschedule your appointment   Please call [phone] with any problems or questions regarding your therapy.        May 2017 Details    Sun Mon Tue Wed Thu Fri Sat      1               2               3               4               5               6                 7               8               9               10               11      5 mg   See details      12      5 mg         13      5 mg           14      5 mg         15      5 mg         16      5 mg         17      5 mg         18            19               20                 21               22               23               24               25               26               27                 28               29               30               31                   Date Details   05/11 This INR check       Date of next INR:  5/18/2017         How to take your warfarin dose     To take:  5 mg Take 1 of the 5 mg tablets.

## 2017-05-11 NOTE — PROGRESS NOTES
ANTICOAGULATION FOLLOW-UP CLINIC VISIT    Patient Name:  Tana Caceres  Date:  5/11/2017  Contact Type:  Telephone/ Stanley Llanos    SUBJECTIVE:     Patient Findings     Positives No Problem Findings    Comments S/O:  Stanley Llanos from  home care calls with patients INR today of 2.2.  Tana Caceres is on Coumadin for PE.  Current Coumadin dose is 5 mg daily.   Concerns today are: None Noted.    A/P: Tana Caceres's INR is Therapeutic  for his/her goal range of 2 - 3.  Reasons why INR is out of range may include:na  Recommended to have Tana Caceres remain on the same Coumadin dose and to have his/her INR rechecked in 1 week on 5/18.      Marina Mohr RN - BC               OBJECTIVE    INR Protime   Date Value Ref Range Status   05/11/2017 2.2 (A) 0.86 - 1.14 Final       ASSESSMENT / PLAN  No question data found.  Anticoagulation Summary as of 5/11/2017     INR goal 2.0-3.0   Today's INR 2.2   Maintenance plan 5 mg (5 mg x 1) every day   Full instructions 5 mg every day   Weekly total 35 mg   Plan last modified Sadaf Michele RN (3/20/2017)   Next INR check 5/18/2017   Target end date Indefinite    Indications   DVT (deep venous thrombosis) (H) [I82.409]  Long-term (current) use of anticoagulants [Z79.01] [Z79.01]  Pulmonary embolism with infarction (H) [I26.99] [I26.99]         Anticoagulation Episode Summary     INR check location     Preferred lab     Send INR reminders to [facility]    Comments       Anticoagulation Care Providers     Provider Role Specialty Phone number    Manuel Taylor MD [facility] Internal Medicine [phone]            See the Encounter Report to view Anticoagulation Flowsheet and Dosing Calendar (Go to Encounters tab in chart review, and find the Anticoagulation Therapy Visit)    Dosage adjustment made based on physician directed care plan.    Marina Mohr RN

## 2017-05-18 ENCOUNTER — ANTICOAGULATION THERAPY VISIT (OUTPATIENT)
Dept: FAMILY MEDICINE | Facility: CLINIC | Age: 77
End: 2017-05-18
Payer: COMMERCIAL

## 2017-05-18 DIAGNOSIS — I82.409 DVT (DEEP VENOUS THROMBOSIS) (H): ICD-10-CM

## 2017-05-18 DIAGNOSIS — I26.99 PULMONARY EMBOLISM WITH INFARCTION (H): ICD-10-CM

## 2017-05-18 DIAGNOSIS — Z79.01 LONG-TERM (CURRENT) USE OF ANTICOAGULANTS: ICD-10-CM

## 2017-05-18 LAB — INR PPP: 1.9

## 2017-05-18 PROCEDURE — 99207 ZZC NO CHARGE NURSE ONLY: CPT | Performed by: INTERNAL MEDICINE

## 2017-05-18 NOTE — PROGRESS NOTES
ANTICOAGULATION FOLLOW-UP CLINIC VISIT    Patient Name:  Tana Caceres  Date:  5/18/2017  Contact Type:  Telephone/ Rupal [phone]    SUBJECTIVE:     Patient Findings     Positives No Problem Findings    Comments S/O:  Rupal from  home care calls with patients INR today of 1.9.  Tana Caceres is on Coumadin for PE.  Current Coumadin dose is 5 mg daily.   Concerns today are: None Noted.    A/P: Tana Caceres's INR is Therapeutic  for his/her goal range of 2 - 3.  Reasons why INR is out of range may include:na  Recommended to have Tana Caceres remain on the same Coumadin dose and to have his/her INR rechecked in 1 week on 5/25.      Marina Mohr RN - BC               OBJECTIVE    INR   Date Value Ref Range Status   05/18/2017 1.9  Final       ASSESSMENT / PLAN  INR assessment THER    Recheck INR In: 1 WEEK    INR Location Homecare INR      Anticoagulation Summary as of 5/18/2017     INR goal 2.0-3.0   Today's INR 1.9!   Maintenance plan 5 mg (5 mg x 1) every day   Full instructions 5 mg every day   Weekly total 35 mg   No change documented Marina Mohr, RN   Plan last modified Sadaf Michele RN (3/20/2017)   Next INR check 5/25/2017   Target end date Indefinite    Indications   DVT (deep venous thrombosis) (H) [I82.409]  Long-term (current) use of anticoagulants [Z79.01] [Z79.01]  Pulmonary embolism with infarction (H) [I26.99] [I26.99]         Anticoagulation Episode Summary     INR check location     Preferred lab     Send INR reminders to [facility]    Comments       Anticoagulation Care Providers     Provider Role Specialty Phone number    Manuel Taylor MD [facility] Internal Medicine [phone]            See the Encounter Report to view Anticoagulation Flowsheet and Dosing Calendar (Go to Encounters tab in chart review, and find the Anticoagulation Therapy Visit)    RN reviewed patient's weekly warfarin dose. Pt INR remains within therapeutic range. Pt will continue with current dosing and  monitoring as planned, based on physician directed care plan.      Marina Mohr RN

## 2017-05-18 NOTE — MR AVS SNAPSHOT
Tana Ramosherb   5/18/2017   Anticoagulation Therapy Visit    Description:  76 year old female   Provider:  Manuel Taylor MD   Department:  [facility] Fp/Im/Peds           INR as of 5/18/2017     Today's INR 1.9!      Anticoagulation Summary as of 5/18/2017     INR goal 2.0-3.0   Today's INR 1.9!   Full instructions 5 mg every day   Next INR check 5/25/2017    Indications   DVT (deep venous thrombosis) (H) [I82.409]  Long-term (current) use of anticoagulants [Z79.01] [Z79.01]  Pulmonary embolism with infarction (H) [I26.99] [I26.99]         May 2017 Details    Sun Mon Tue Wed Thu Fri Sat      1               2               3               4               5               6                 7               8               9               10               11               12               13                 14               15               16               17               18      5 mg   See details      19      5 mg         20      5 mg           21      5 mg         22      5 mg         23      5 mg         24      5 mg         25            26               27                 28               29               30               31                   Date Details   05/18 This INR check       Date of next INR:  5/25/2017         How to take your warfarin dose     To take:  5 mg Take 1 of the 5 mg tablets.

## 2017-05-25 ENCOUNTER — ANTICOAGULATION THERAPY VISIT (OUTPATIENT)
Dept: NURSING | Facility: CLINIC | Age: 77
End: 2017-05-25
Payer: COMMERCIAL

## 2017-05-25 DIAGNOSIS — I82.409 DVT (DEEP VENOUS THROMBOSIS) (H): ICD-10-CM

## 2017-05-25 DIAGNOSIS — I26.99 PULMONARY EMBOLISM WITH INFARCTION (H): ICD-10-CM

## 2017-05-25 DIAGNOSIS — Z79.01 LONG-TERM (CURRENT) USE OF ANTICOAGULANTS: ICD-10-CM

## 2017-05-25 LAB — INR PPP: 2.4

## 2017-05-25 PROCEDURE — 99207 ZZC NO CHARGE NURSE ONLY: CPT | Performed by: INTERNAL MEDICINE

## 2017-05-25 NOTE — PROGRESS NOTES
ANTICOAGULATION FOLLOW-UP CLINIC VISIT    Patient Name:  Tana Caceres  Date:  5/25/2017  Contact Type:  Telephone/ Karine [phone]    SUBJECTIVE:     Patient Findings     Positives No Problem Findings    Comments S/O:  Karine from  home care calls with patients INR today of 2.4.  Tana Caceres is on Coumadin for PE.  Current Coumadin dose is 5 mg daily.   Concerns today are: None Noted.    A/P: Tana Caceres's INR is Therapeutic  for his/her goal range of 2 - 3.  Reasons why INR is out of range may include:na  Recommended to have Tana Caceres remain on the same Coumadin dose and to have his/her INR rechecked in 1 week on 6/1/17.      Marina Mohr RN - BC               OBJECTIVE    INR   Date Value Ref Range Status   05/25/2017 2.4  Final       ASSESSMENT / PLAN  No question data found.  Anticoagulation Summary as of 5/25/2017     INR goal 2.0-3.0   Today's INR 2.4   Maintenance plan 5 mg (5 mg x 1) every day   Full instructions 5 mg every day   Weekly total 35 mg   No change documented Marina Mohr RN   Plan last modified Sadaf Michele RN (3/20/2017)   Next INR check 6/1/2017   Target end date Indefinite    Indications   DVT (deep venous thrombosis) (H) [I82.409]  Long-term (current) use of anticoagulants [Z79.01] [Z79.01]  Pulmonary embolism with infarction (H) [I26.99] [I26.99]         Anticoagulation Episode Summary     INR check location     Preferred lab     Send INR reminders to [facility]    Comments       Anticoagulation Care Providers     Provider Role Specialty Phone number    Manuel Taylor MD [facility] Internal Medicine [phone]            See the Encounter Report to view Anticoagulation Flowsheet and Dosing Calendar (Go to Encounters tab in chart review, and find the Anticoagulation Therapy Visit)    Dosage adjustment made based on physician directed care plan.    Marina Mohr, IKE

## 2017-05-25 NOTE — MR AVS SNAPSHOT
Tana Caceres   5/25/2017   Anticoagulation Therapy Visit    Description:  76 year old female   Provider:  Manuel Taylor MD   Department:  [facility] Nurse           INR as of 5/25/2017     Today's INR 2.4      Anticoagulation Summary as of 5/25/2017     INR goal 2.0-3.0   Today's INR 2.4   Full instructions 5 mg every day   Next INR check 6/1/2017    Indications   DVT (deep venous thrombosis) (H) [I82.409]  Long-term (current) use of anticoagulants [Z79.01] [Z79.01]  Pulmonary embolism with infarction (H) [I26.99] [I26.99]         Contact Numbers     [facility]  Please call [phone] to cancel and/or reschedule your appointment   Please call [phone] with any problems or questions regarding your therapy.        May 2017 Details    Sun Mon Tue Wed Thu Fri Sat      1               2               3               4               5               6                 7               8               9               10               11               12               13                 14               15               16               17               18               19               20                 21               22               23               24               25      5 mg   See details      26      5 mg         27      5 mg           28      5 mg         29      5 mg         30      5 mg         31      5 mg             Date Details   05/25 This INR check               How to take your warfarin dose     To take:  5 mg Take 1 of the 5 mg tablets.           June 2017 Details    Sun Mon Tue Wed Thu Fri Sat         1            2               3                 4               5               6               7               8               9               10                 11               12               13               14               15               16               17                 18               19               20               21               22               23               24                  25               26               27               28               29               30                 Date Details   No additional details    Date of next INR:  6/1/2017         How to take your warfarin dose     To take:  5 mg Take 1 of the 5 mg tablets.

## 2017-05-26 ENCOUNTER — TELEPHONE (OUTPATIENT)
Dept: FAMILY MEDICINE | Facility: CLINIC | Age: 77
End: 2017-05-26

## 2017-05-26 NOTE — TELEPHONE ENCOUNTER
Reason for Call:  Other call back    Detailed comments:  Karine calling. She would like to get an order to continue home care. One time per week for four weeks. 3 prn. For INR monitoring.     Phone Number Patient can be reached at: Other phone number:  Number above     Best Time:  Any     Can we leave a detailed message on this number? YES    Call taken on 5/26/2017 at 8:54 AM by Neeru Ace

## 2017-06-01 ENCOUNTER — HOSPITAL ENCOUNTER (EMERGENCY)
Facility: CLINIC | Age: 77
Discharge: HOME | End: 2017-06-01
Attending: EMERGENCY MEDICINE | Admitting: EMERGENCY MEDICINE
Payer: COMMERCIAL

## 2017-06-01 ENCOUNTER — ANTICOAGULATION THERAPY VISIT (OUTPATIENT)
Dept: NURSING | Facility: CLINIC | Age: 77
End: 2017-06-01
Payer: COMMERCIAL

## 2017-06-01 ENCOUNTER — APPOINTMENT (OUTPATIENT)
Dept: GENERAL RADIOLOGY | Facility: CLINIC | Age: 77
End: 2017-06-01
Attending: EMERGENCY MEDICINE
Payer: COMMERCIAL

## 2017-06-01 ENCOUNTER — TELEPHONE (OUTPATIENT)
Dept: INTERNAL MEDICINE | Facility: CLINIC | Age: 77
End: 2017-06-01

## 2017-06-01 VITALS
RESPIRATION RATE: 18 BRPM | OXYGEN SATURATION: 95 % | HEART RATE: 72 BPM | SYSTOLIC BLOOD PRESSURE: 157 MMHG | DIASTOLIC BLOOD PRESSURE: 93 MMHG | TEMPERATURE: 98.6 F

## 2017-06-01 DIAGNOSIS — Z79.01 LONG-TERM (CURRENT) USE OF ANTICOAGULANTS: ICD-10-CM

## 2017-06-01 DIAGNOSIS — I26.99 PULMONARY EMBOLISM WITH INFARCTION (H): ICD-10-CM

## 2017-06-01 DIAGNOSIS — Z79.01 LONG TERM CURRENT USE OF ANTICOAGULANT THERAPY: ICD-10-CM

## 2017-06-01 DIAGNOSIS — I82.409 DVT (DEEP VENOUS THROMBOSIS) (H): ICD-10-CM

## 2017-06-01 DIAGNOSIS — Z86.711 HISTORY OF PULMONARY EMBOLISM: ICD-10-CM

## 2017-06-01 DIAGNOSIS — R07.9 ACUTE CHEST PAIN: ICD-10-CM

## 2017-06-01 LAB
INR PPP: 1.81 (ref 0.86–1.14)
INR PPP: 2.1
INTERPRETATION ECG - MUSE: NORMAL
TROPONIN I SERPL-MCNC: NORMAL UG/L (ref 0–0.04)

## 2017-06-01 PROCEDURE — 93010 ELECTROCARDIOGRAM REPORT: CPT | Mod: Z6 | Performed by: EMERGENCY MEDICINE

## 2017-06-01 PROCEDURE — 99207 ZZC NO CHARGE NURSE ONLY: CPT | Performed by: INTERNAL MEDICINE

## 2017-06-01 PROCEDURE — 99285 EMERGENCY DEPT VISIT HI MDM: CPT | Mod: 25 | Performed by: EMERGENCY MEDICINE

## 2017-06-01 PROCEDURE — 93005 ELECTROCARDIOGRAM TRACING: CPT | Performed by: EMERGENCY MEDICINE

## 2017-06-01 PROCEDURE — 71010 XR CHEST PORT 1 VW: CPT

## 2017-06-01 PROCEDURE — 99284 EMERGENCY DEPT VISIT MOD MDM: CPT | Mod: 25 | Performed by: EMERGENCY MEDICINE

## 2017-06-01 PROCEDURE — 85610 PROTHROMBIN TIME: CPT | Performed by: EMERGENCY MEDICINE

## 2017-06-01 PROCEDURE — 84484 ASSAY OF TROPONIN QUANT: CPT | Performed by: EMERGENCY MEDICINE

## 2017-06-01 ASSESSMENT — ENCOUNTER SYMPTOMS
HEADACHES: 0
SPEECH DIFFICULTY: 0
SHORTNESS OF BREATH: 0

## 2017-06-01 NOTE — ED AVS SNAPSHOT
[facility], Emergency Department    [address]    [place] [address]    Phone:  [phone]    Fax:  [phone]                                       Tana Caceres   MRN: [id number]    Department:  [facility], Emergency Department   Date of Visit:  6/1/2017           Patient Information     Date Of Birth          1940        Your diagnoses for this visit were:     Acute chest pain        You were seen by Jose R Pollack MD.        Discharge Instructions       No evidence for stroke or heart injury  Follow up with your MD/Clinic    24 Hour Appointment Hotline       To make an appointment at any [facility], call [phone] ([phone]). If you don't have a family doctor or clinic, we will help you find one. [facility] clinics are conveniently located to serve the needs of you and your family.             Review of your medicines      Our records show that you are taking the medicines listed below. If these are incorrect, please call your family doctor or clinic.        Dose / Directions Last dose taken    * ACETAMINOPHEN PO   Dose:  500 mg        Take 500 mg by mouth every 6 hours as needed for pain   Refills:  0        * acetaminophen 325 MG tablet   Commonly known as:  TYLENOL   Dose:  650 mg   Quantity:  100 tablet        Take 2 tablets (650 mg) by mouth every 6 hours as needed for mild pain   Refills:  1        amLODIPine 5 MG tablet   Commonly known as:  NORVASC   Dose:  5 mg        Take 5 mg by mouth   Refills:  0        aspirin 81 MG tablet   Dose:  81 mg   Quantity:  30 tablet        Take 1 tablet (81 mg) by mouth daily   Refills:  11        atorvastatin 20 MG tablet   Commonly known as:  LIPITOR   Dose:  20 mg        Take 20 mg by mouth   Refills:  0        cholecalciferol 1000 UNIT tablet   Commonly known as:  vitamin D   Dose:  1000 Units   Quantity:  90 tablet        Take 1 tablet (1,000 Units) by mouth daily   Refills:  3        ENALAPRIL MALEATE PO   Dose:  20  mg        Take 20 mg by mouth   Refills:  0        LEVOTHYROXINE SODIUM PO   Dose:  75 mcg        Take 75 mcg by mouth daily   Refills:  0        * order for DME   Quantity:  1 Device        Equipment being ordered: shower chair, with hand placement on seat   Refills:  0        * order for DME   Quantity:  1 Device        Equipment being ordered: wall bars with screws, no suction   Refills:  0        oxybutynin 10 MG 24 hr tablet   Commonly known as:  DITROPAN-XL   Quantity:  60 tablet        TAKE TWO TABLETS (20MG) BY MOUTH DAILY. IF CONTINUED UNSATISFACTORY RESULTS MAY INCREASE TO MAX DOSE OF 30MG DAILY AFTER 1-2 WEEKS   Refills:  1        piroxicam 20 MG capsule   Commonly known as:  FELDENE   Dose:  20 mg   Quantity:  30 capsule        Take 1 capsule (20 mg) by mouth daily (with breakfast)   Refills:  1        polyethylene glycol powder   Commonly known as:  MIRALAX   Dose:  1 capful   Quantity:  510 g        Take 17 g (1 capful) by mouth daily   Refills:  1        warfarin 5 MG tablet   Commonly known as:  COUMADIN        Take 7.5mg Sun, Wed. Take 5mg all other days of the week. Or as directed by INR clinic   Refills:  0        * Notice:  This list has 4 medication(s) that are the same as other medications prescribed for you. Read the directions carefully, and ask your doctor or other care provider to review them with you.            Procedures and tests performed during your visit     EKG 12 lead    EKG 12-lead, tracing only    INR    Troponin I    XR Chest Port 1 View      Orders Needing Specimen Collection     None      Pending Results     No orders found from 5/30/2017 to 6/2/2017.            Pending Culture Results     No orders found from 5/30/2017 to 6/2/2017.            Pending Results Instructions     If you had any lab results that were not finalized at the time of your Discharge, you can call the ED Lab Result RN at [phone]. You will be contacted by this team for any positive Lab results or  changes in treatment. The nurses are available 7 days a week from 10A to 6:30P.  You can leave a message 24 hours per day and they will return your call.        Thank you for choosing [facility]       Thank you for choosing [facility] for your care. Our goal is always to provide you with excellent care. Hearing back from our patients is one way we can continue to improve our services. Please take a few minutes to complete the written survey that you may receive in the mail after you visit with us. Thank you!        [facility] [employer] Information     [email or web address] gives you secure access to your electronic health record. If you see a primary care provider, you can also send messages to your care team and make appointments. If you have questions, please call your primary care clinic.  If you do not have a primary care provider, please call [phone] and they will assist you.        Care EveryWhere ID     This is your Care EveryWhere ID. This could be used by other organizations to access your [facility] medical records  [id number]        After Visit Summary       This is your record. Keep this with you and show to your community pharmacist(s) and doctor(s) at your next visit.

## 2017-06-01 NOTE — MR AVS SNAPSHOT
Tana Ramosherb   6/1/2017   Anticoagulation Therapy Visit    Description:  76 year old female   Provider:  Manuel Taylor MD   Department:  [facility] Nurse           INR as of 6/1/2017     Today's INR 2.1      Anticoagulation Summary as of 6/1/2017     INR goal 2.0-3.0   Today's INR 2.1   Full instructions 5 mg every day   Next INR check 6/15/2017    Indications   DVT (deep venous thrombosis) (H) [I82.409]  Long-term (current) use of anticoagulants [Z79.01] [Z79.01]  Pulmonary embolism with infarction (H) [I26.99] [I26.99]         Contact Numbers     [facility]  Please call [phone] to cancel and/or reschedule your appointment   Please call [phone] with any problems or questions regarding your therapy.        June 2017 Details    Sun Mon Tue Wed Thu Fri Sat         1      5 mg   See details      2      5 mg         3      5 mg           4      5 mg         5      5 mg         6      5 mg         7      5 mg         8      5 mg         9      5 mg         10      5 mg           11      5 mg         12      5 mg         13      5 mg         14      5 mg         15            16               17                 18               19               20               21               22               23               24                 25               26               27               28               29               30                 Date Details   06/01 This INR check       Date of next INR:  6/15/2017         How to take your warfarin dose     To take:  5 mg Take 1 of the 5 mg tablets.

## 2017-06-01 NOTE — ED PROVIDER NOTES
History     Chief Complaint   Patient presents with     Chest Pain     started this morning, L chest pain. No sob, nausea or vomiting     HPI  Tana Caceres is a 76 year old female brought in by concerned son. He says this morning she was having difficulty getting out of bed. He reports that she also complained of some chest pain. She does have a history of pulmonary embolism and is on warfarin. She apparently has a previous stroke. He equates chest pain to stroke. He checked her blood pressure this morning and it was 166 systolic and this caused him considerable anxiety. Here she is asymptomatic, there are no problems with her speech or her ambulation. She has been compliant with her medications. No shortness of breath, no headache, no recent trauma.       This part of the document was transcribed by Mariangel Soto, Medical Scribe.   I have reviewed the Medications, Allergies, Past Medical and Surgical History, and Social History in the [employer] system.  Past Medical History:   Diagnosis Date     Cerebral infarction (H)      Hypertension        Past Surgical History:   Procedure Laterality Date     C PARTIAL EXCISION THYROID,BILAT       CATARACT IOL, RT/LT  8/14/2014    Procedure: EXTRACTION, CATARACT, WITH INTRAOCULAR LENS INSERTION, POSTERIOR CHAMBER; Laterality: Right; Surgeon: Eufemia Chatman MD; Service: Ophthalmology     CATARACT IOL, RT/LT  11/10/2014    Procedure: EXTRACTION, CATARACT, WITH INTRAOCULAR LENS INSERTION, POSTERIOR CHAMBER; Laterality: Left; Surgeon: Gulshan Pickering MD; Service: Ophthalmology     NEPHRECTOMY  1980s     SURGICAL HISTORY OF -       R ear surgery     TYMPANOPLASTY  10/14/2014    Procedure: TYMPANOPLASTY; Laterality: Right; Surgeon: Stephen Abarca MD; Service: Otolaryngology     XR LAP BAND      uncertain       Family History   Problem Relation Age of Onset     Hypertension Father      Hypertension Mother      Ovarian Cancer No family hx of      Cancer - colorectal No family hx of       Breast Cancer No family hx of      Hypertension Brother      Glaucoma Father      Glaucoma Brother        Social History   Substance Use Topics     Smoking status: Never Smoker     Smokeless tobacco: Never Used     Alcohol use No     No current facility-administered medications for this encounter.      Current Outpatient Prescriptions   Medication     oxybutynin (DITROPAN-XL) 10 MG 24 hr tablet     warfarin (COUMADIN) 5 MG tablet     cholecalciferol (VITAMIN D) 1000 UNIT tablet     amLODIPine (NORVASC) 5 MG tablet     atorvastatin (LIPITOR) 20 MG tablet     aspirin 81 MG tablet     piroxicam (FELDENE) 20 MG capsule     LEVOTHYROXINE SODIUM PO     ENALAPRIL MALEATE PO     order for DME     order for DME     polyethylene glycol (MIRALAX) powder     ACETAMINOPHEN PO     acetaminophen (TYLENOL) 325 MG tablet      No Known Allergies    Review of Systems   Respiratory: Negative for shortness of breath.    Cardiovascular: Positive for chest pain (resolved).   Musculoskeletal: Negative for gait problem.   Neurological: Negative for speech difficulty and headaches.   All other systems reviewed and are negative.      Physical Exam   BP: 156/85  Pulse: 72  Temp: 98.6  F (37  C)  Resp: 18  SpO2: 95 %  Physical Exam   Constitutional: She appears well-developed and well-nourished. No distress.   Neck: Neck supple.   Cardiovascular: Normal rate, regular rhythm and normal heart sounds.    Pulmonary/Chest: Effort normal and breath sounds normal.   Abdominal: There is no tenderness.   Neurological: She is alert. She has normal strength. No cranial nerve deficit. Coordination normal. GCS eye subscore is 4. GCS verbal subscore is 5. GCS motor subscore is 6.   Skin: Skin is warm. She is not diaphoretic.   Psychiatric: She has a normal mood and affect. Her behavior is normal.   Vitals reviewed.      ED Course     ED Course     Procedures             EKG Interpretation:      Interpreted by Jose R Pollack  Time reviewed:  1324  Symptoms at time of EKG: none   Rhythm: normal sinus   Rate: normal  Axis: normal  Ectopy: none  Conduction: normal  ST Segments/ T Waves: No ST-T wave changes  Q Waves: none  Comparison to prior: No old EKG available    Clinical Impression: normal EKG        Results for orders placed or performed during the hospital encounter of 06/01/17   XR Chest Port 1 View    Narrative    XR CHEST PORT 1 VW 6/1/2017 1:41 PM    COMPARISON: None.    HISTORY: Chest pain      Impression    IMPRESSION: Mildly enlarged cardiac silhouette. Mild atelectasis at  the left base and in the right mid lung. No significant pleural  effusion on either side. No pneumothorax.    HAYDEN Romero negative         Labs Ordered and Resulted from Time of ED Arrival Up to the Time of Departure from the ED   INR - Abnormal; Notable for the following:        Result Value    INR 1.81 (*)     All other components within normal limits   TROPONIN I            Assessments & Plan (with Medical Decision Making)   76 year old female brought in by concerned family members because earlier in the day she had complained of some chest pain and was having some difficulty getting out of bed. They checked her blood pressure, it was about 160 systolic. They became very anxious and brought her here. By their report she has a history of stroke. I see no evidence for stroke. Her speech, balance, and motor function are entirely normal. She has a completely normal EKG, and a negative troponin that was drawn six hours after her chest pain. She is on warfarin for a pulmonary embolism. She is not hypoxic, not tachycardic, she has no headache. I don t see any indication for additional work up at this time.     I have reviewed the nursing notes.    I have reviewed the findings, diagnosis, plan and need for follow up with the patient.  This part of the document was transcribed by Mariangel Soto Medical Scribe.   New Prescriptions    No medications on file       Final  diagnoses:   Acute chest pain       6/1/2017   [facility], [place], EMERGENCY DEPARTMENT     Jose R Pollack MD  06/01/17 1667

## 2017-06-01 NOTE — ED AVS SNAPSHOT
[facility], [place], Emergency Department    [address]    [facility] [address]    Phone:  [phone]    Fax:  [phone]                                       Tana Caceres   MRN: [id number]    Department:  [facility], Emergency Department   Date of Visit:  6/1/2017           After Visit Summary Signature Page     I have received my discharge instructions, and my questions have been answered. I have discussed any challenges I see with this plan with the nurse or doctor.    ..........................................................................................................................................  Patient/Patient Representative Signature      ..........................................................................................................................................  Patient Representative Print Name and Relationship to Patient    ..................................................               ................................................  Date                                            Time    ..........................................................................................................................................  Reviewed by Signature/Title    ...................................................              ..............................................  Date                                                            Time

## 2017-06-01 NOTE — PROGRESS NOTES
ANTICOAGULATION FOLLOW-UP CLINIC VISIT    Patient Name:  Tana Caceres  Date:  6/1/2017  Contact Type:  Telephone/ Karine [phone]    SUBJECTIVE:     Patient Findings     Positives No Problem Findings    Comments S/O:  Karine from  home care calls with patients INR today of 2.1.  Tana Caceres is on Coumadin for PE.  Current Coumadin dose is 5 mg daily.   Concerns today are: None Noted.    A/P: Tana Caceres's INR is Therapeutic  for his/her goal range of 2 - 3.  Reasons why INR is out of range may include:na  Recommended to have Tana Caceres remain on the same Coumadin dose and to have his/her INR rechecked in 2 weeks on 6/15.      Marina Mohr RN - BC                OBJECTIVE    INR   Date Value Ref Range Status   06/01/2017 2.1  Final       ASSESSMENT / PLAN  No question data found.  Anticoagulation Summary as of 6/1/2017     INR goal 2.0-3.0   Today's INR 2.1   Maintenance plan 5 mg (5 mg x 1) every day   Full instructions 5 mg every day   Weekly total 35 mg   No change documented Marina Mohr RN   Plan last modified Sadaf Michele RN (3/20/2017)   Next INR check 6/15/2017   Target end date Indefinite    Indications   DVT (deep venous thrombosis) (H) [I82.409]  Long-term (current) use of anticoagulants [Z79.01] [Z79.01]  Pulmonary embolism with infarction (H) [I26.99] [I26.99]         Anticoagulation Episode Summary     INR check location     Preferred lab     Send INR reminders to [facility] CLINIC    Comments       Anticoagulation Care Providers     Provider Role Specialty Phone number    Manuel Taylor MD [facility] Internal Medicine [phone]            See the Encounter Report to view Anticoagulation Flowsheet and Dosing Calendar (Go to Encounters tab in chart review, and find the Anticoagulation Therapy Visit)    Dosage adjustment made based on physician directed care plan.    Marina Mohr RN

## 2017-06-01 NOTE — TELEPHONE ENCOUNTER
Reason for Call:   ALEJANDRA    Detailed comments: Karine with [facility] states patient is complaining of chest pain and patient's son is taking her to the ER. Patient didn't take her morning pills and her bp is 156/94.     Phone Number Patient can be reached at: Home number on file [phone] (home)    Best Time: any    Can we leave a detailed message on this number? YES    Call taken on 6/1/2017 at 11:47 AM by Marianne Medley

## 2017-06-02 ENCOUNTER — CARE COORDINATION (OUTPATIENT)
Dept: CARE COORDINATION | Facility: CLINIC | Age: 77
End: 2017-06-02

## 2017-06-02 ENCOUNTER — OFFICE VISIT (OUTPATIENT)
Dept: FAMILY MEDICINE | Facility: CLINIC | Age: 77
End: 2017-06-02
Payer: COMMERCIAL

## 2017-06-02 VITALS
BODY MASS INDEX: 37.34 KG/M2 | TEMPERATURE: 99.2 F | OXYGEN SATURATION: 92 % | HEIGHT: 56 IN | HEART RATE: 76 BPM | SYSTOLIC BLOOD PRESSURE: 137 MMHG | WEIGHT: 166 LBS | DIASTOLIC BLOOD PRESSURE: 74 MMHG

## 2017-06-02 DIAGNOSIS — Z79.01 LONG-TERM (CURRENT) USE OF ANTICOAGULANTS: ICD-10-CM

## 2017-06-02 DIAGNOSIS — I10 HYPERTENSION GOAL BP (BLOOD PRESSURE) < 140/90: ICD-10-CM

## 2017-06-02 DIAGNOSIS — I63.50 RIGHT PONTINE CVA (H): ICD-10-CM

## 2017-06-02 DIAGNOSIS — I27.82 OTHER CHRONIC PULMONARY EMBOLISM WITHOUT ACUTE COR PULMONALE (H): ICD-10-CM

## 2017-06-02 DIAGNOSIS — R07.89 ATYPICAL CHEST PAIN: Primary | ICD-10-CM

## 2017-06-02 PROCEDURE — 99214 OFFICE O/P EST MOD 30 MIN: CPT | Performed by: FAMILY MEDICINE

## 2017-06-02 NOTE — LETTER
[facility]   [address] [place] [address]    June 2, 2017    Tana Caceres  [address] [place] [address]    Dear Tana,  I am the Clinic Care Coordinator that works with your primary care provider's clinic. I wanted to introduce myself and provide you with my contact information for you to be able to call me with any questions or concerns. Below is a description of what Clinic Care Coordination is and how I can further assist you.     The Clinic Care Coordinator role is a Registered Nurse and/or  who understands the health care system. The goal of Clinic Care Coordination is to help you manage your health and improve access to the [facility] in the most efficient manner.  The Registered Nurse can assist you in meeting your health care goals by providing education, coordinating services, and strengthening the communication among your providers. The  can assist you with financial, behavioral, psychosocial, and chemical dependency and counseling/psychiatric resources.    Please feel free to keep this letter and contact information to contact me at  [phone] with any further questions or concerns that may arise. We at [facility] are focused on providing you with the highest-quality healthcare experience possible and that all starts with you.       Sincerely,     Carmela Garrett RN BSN, PHN RN Care Coordinator  [facility]  [email or web address]  [phone]  6/2/2017 10:25 AM  Enclosed: I have enclosed a copy of a 24 Hour Access Plan. This has helpful phone numbers for you to call when needed. Please keep this in an easy to access place to use as needed.

## 2017-06-02 NOTE — PROGRESS NOTES
Clinic Care Coordination Contact  [facility]/Voicemail    Referral Source: PCP    Clinical Data: Care Coordinator Outreach: Pt seen in the ED on 6/1 with c/o chest pain.  Pt discharged home with plan to f/u with provider at [facility] today.  Per chart review noted pt was open to [facility] with Karine listed as the RN CM.  Email sent to Karine regarding f/u plan.   Noted that CC JOSSELYN had attempted outreach to patient on 4/28/17 with no response.  Requesting assistance with PCA services.      Outreach attempted x 1.  Left message on voicemail with call back information and requested return call.    Plan: Care Coordinator will mail out care coordination introduction letter with care coordinator contact information and explanation of care coordination services. Care Coordinator will try to reach patient again in 1-2 business days.      Carmela Garrett RN BSN, PHN RN Care Coordinator  [facility]  [email or web address]  [phone]  6/2/2017 10:22 AM

## 2017-06-02 NOTE — PROGRESS NOTES
SUBJECTIVE:                                                    Tana Caceres is a 76 year old female who presents to clinic today for the following health issues:      ED/UC Followup:    Facility:  [facility]  Date of visit: 6/1/17  Reason for visit: Acute chest pain  Current Status: Doing fine now, Her blood pressure was high yesterday. Son would like to discuss her blood pressure medication.       She is brought in by her son. She was just seen yesterday in the ER for acute chest pain that has resolved. Her son wanted her to be seen as soon as possible and follow-up because he had some further questions and concerns about her health.  He describes a history consistent with DVT and PE many years ago. She is on warfarin for that. She also had a CVA within the last year. I do see a history of factor V Leiden mutation in her chart.    He was concerned that her blood pressure medication effectiveness would wear off after a number of months. He is still concerned about her having blood clots.  She does apparently get weekly home health checks.  She is on medication as below. She normally sees Dr. Taylor for her primary care.    Problem list and histories reviewed & adjusted, as indicated.  Additional history: as documented    Patient Active Problem List   Diagnosis     Hypertension goal BP (blood pressure) < 140/90     DVT (deep venous thrombosis) (H)     Acquired hypothyroidism     Factor V Leiden (H)     Unilateral sensorineural hearing loss     Mixed hearing loss     Osteoporosis     History of nephrectomy     Adrenal mass, left (H)     Right pontine CVA (H)     DO (dyspnea on exertion)     Primary osteoarthritis of knee     Morbid obesity (H)     Physical deconditioning     Dysgeusia     Cataract     LAP-BAND surgery status     Left adrenal mass (H)     Age-related osteoporosis without current pathological fracture     Hyperlipidemia LDL goal <100     Long term current use of anticoagulant therapy      Hyperglobulinemia     History of partial thyroidectomy     Microscopic hematuria     Persistent insomnia     MCI (mild cognitive impairment)     Other chronic pulmonary embolism without acute cor pulmonale (H)     Long-term (current) use of anticoagulants [Z79.01]     Pulmonary embolism with infarction (H) [I26.99]     Past Surgical History:   Procedure Laterality Date     C PARTIAL EXCISION THYROID,BILAT       CATARACT IOL, RT/LT  8/14/2014    Procedure: EXTRACTION, CATARACT, WITH INTRAOCULAR LENS INSERTION, POSTERIOR CHAMBER; Laterality: Right; Surgeon: Eufemia Chatman MD; Service: Ophthalmology     CATARACT IOL, RT/LT  11/10/2014    Procedure: EXTRACTION, CATARACT, WITH INTRAOCULAR LENS INSERTION, POSTERIOR CHAMBER; Laterality: Left; Surgeon: Gulshan Pickering MD; Service: Ophthalmology     NEPHRECTOMY  1980s     SURGICAL HISTORY OF -       R ear surgery     TYMPANOPLASTY  10/14/2014    Procedure: TYMPANOPLASTY; Laterality: Right; Surgeon: Stephen Abarca MD; Service: Otolaryngology     XR LAP BAND      uncertain       Social History   Substance Use Topics     Smoking status: Never Smoker     Smokeless tobacco: Never Used     Alcohol use No     Family History   Problem Relation Age of Onset     Hypertension Father      Glaucoma Father      Hypertension Mother      Hypertension Brother      Glaucoma Brother      Ovarian Cancer No family hx of      Cancer - colorectal No family hx of      Breast Cancer No family hx of          Current Outpatient Prescriptions   Medication Sig Dispense Refill     oxybutynin (DITROPAN-XL) 10 MG 24 hr tablet TAKE TWO TABLETS (20MG) BY MOUTH DAILY. IF CONTINUED UNSATISFACTORY RESULTS MAY INCREASE TO MAX DOSE OF 30MG DAILY AFTER 1-2 WEEKS 60 tablet 1     warfarin (COUMADIN) 5 MG tablet Take 7.5mg Sun, Wed. Take 5mg all other days of the week. Or as directed by INR clinic       cholecalciferol (VITAMIN D) 1000 UNIT tablet Take 1 tablet (1,000 Units) by mouth daily 90 tablet 3      "amLODIPine (NORVASC) 5 MG tablet Take 5 mg by mouth       atorvastatin (LIPITOR) 20 MG tablet Take 20 mg by mouth       aspirin 81 MG tablet Take 1 tablet (81 mg) by mouth daily 30 tablet 11     order for DME Equipment being ordered: wall bars with screws, no suction 1 Device 0     polyethylene glycol (MIRALAX) powder Take 17 g (1 capful) by mouth daily 510 g 1     piroxicam (FELDENE) 20 MG capsule Take 1 capsule (20 mg) by mouth daily (with breakfast) 30 capsule 1     LEVOTHYROXINE SODIUM PO Take 75 mcg by mouth daily       ENALAPRIL MALEATE PO Take 20 mg by mouth       ACETAMINOPHEN PO Take 500 mg by mouth every 6 hours as needed for pain       acetaminophen (TYLENOL) 325 MG tablet Take 2 tablets (650 mg) by mouth every 6 hours as needed for mild pain 100 tablet 1     No Known Allergies    Reviewed and updated as needed this visit by clinical staff  Tobacco  Allergies  Med Hx  Surg Hx  Fam Hx  Soc Hx      Reviewed and updated as needed this visit by Provider         ROS:  See above and ER note from yesterday    OBJECTIVE:                                                    /74 (BP Location: Right arm, Patient Position: Chair, Cuff Size: Adult Regular)  Pulse 76  Temp 99.2  F (37.3  C) (Oral)  Ht 4' 7.5\" (1.41 m)  Wt 166 lb (75.3 kg)  SpO2 92%  BMI 37.89 kg/m2  Body mass index is 37.89 kg/(m^2).  GENERAL: alert, no distress and elderly  RESP: lungs clear to auscultation - no rales, rhonchi or wheezes  CHEST: Nontender to palpation  CV: regular rate and rhythm  MS: no gross musculoskeletal defects noted, no edema    Diagnostic Test Results:  Her ER note and test results from yesterday were reviewed      ASSESSMENT/PLAN:                                                        ICD-10-CM    1. Atypical chest pain R07.89    2. Hypertension goal BP (blood pressure) < 140/90 I10    3. Right pontine CVA (H) I63.50    4. Other chronic pulmonary embolism without acute cor pulmonale (H) I27.82    5. Long-term " (current) use of anticoagulants [Z79.01] Z79.01      Her chest pain has resolved and not recurred  She is on appropriate medication for her history of factor V Leiden mutation as well as h/o DVT/pulmonary embolism and CVA  Blood pressure and other vital signs look fine today  I advised her son to keep the patient on her same medications (he sets up her medications for her)  I suggested checking home blood pressure readings in the next few weeks and then return to see her PCP for follow-up and review of blood pressure readings  Recheck sooner prn    Himanshu Amezquita MD  [facility]

## 2017-06-02 NOTE — MR AVS SNAPSHOT
After Visit Summary   6/2/2017    Tana Caceres    MRN: [id number]           Patient Information     Date Of Birth          1940        Visit Information        Provider Department      6/2/2017 2:20 PM Himanshu Amezquita MD [facility]        Today's Diagnoses     Atypical chest pain    -  1    Hypertension goal BP (blood pressure) < 140/90        Right pontine CVA (H)           Follow-ups after your visit        Follow-up notes from your care team     Return if symptoms worsen or fail to improve.      Who to contact     If you have questions or need follow up information about today's clinic visit or your schedule please contact [facility] directly at [phone].  Normal or non-critical lab and imaging results will be communicated to you by MyChart, letter or phone within 4 business days after the clinic has received the results. If you do not hear from us within 7 days, please contact the clinic through [employer] or phone. If you have a critical or abnormal lab result, we will notify you by phone as soon as possible.  Submit refill requests through [employer] or call your pharmacy and they will forward the refill request to us. Please allow 3 business days for your refill to be completed.          Additional Information About Your Visit        MyChart Information     [employer] gives you secure access to your electronic health record. If you see a primary care provider, you can also send messages to your care team and make appointments. If you have questions, please call your primary care clinic.  If you do not have a primary care provider, please call [phone] and they will assist you.        Care EveryWhere ID     This is your Care EveryWhere ID. This could be used by other organizations to access your [place] medical records  [id number]        Your Vitals Were     Pulse Temperature Height Pulse Oximetry BMI (Body Mass Index)       76 99.2  F (37.3  " C) (Oral) 4' 7.5\" (1.41 m) 92% 37.89 kg/m2        Blood Pressure from Last 3 Encounters:   06/02/17 137/74   06/01/17 (!) 157/93   12/22/16 112/70    Weight from Last 3 Encounters:   06/02/17 166 lb (75.3 kg)   12/22/16 175 lb (79.4 kg)   11/17/16 175 lb (79.4 kg)              Today, you had the following     No orders found for display       Primary Care Provider Office Phone # Fax #    Manuel Taylor [phone]       [address] [address]        Thank you!     Thank you for choosing [facility]  for your care. Our goal is always to provide you with excellent care. Hearing back from our patients is one way we can continue to improve our services. Please take a few minutes to complete the written survey that you may receive in the mail after your visit with us. Thank you!             Your Updated Medication List - Protect others around you: Learn how to safely use, store and throw away your medicines at www.disposemymeds.org.          This list is accurate as of: 6/2/17  2:34 PM.  Always use your most recent med list.                   Brand Name Dispense Instructions for use    * ACETAMINOPHEN PO      Take 500 mg by mouth every 6 hours as needed for pain       * acetaminophen 325 MG tablet    TYLENOL    100 tablet    Take 2 tablets (650 mg) by mouth every 6 hours as needed for mild pain       amLODIPine 5 MG tablet    NORVASC     Take 5 mg by mouth       aspirin 81 MG tablet     30 tablet    Take 1 tablet (81 mg) by mouth daily       atorvastatin 20 MG tablet    LIPITOR     Take 20 mg by mouth       cholecalciferol 1000 UNIT tablet    vitamin D    90 tablet    Take 1 tablet (1,000 Units) by mouth daily       ENALAPRIL MALEATE PO      Take 20 mg by mouth       LEVOTHYROXINE SODIUM PO      Take 75 mcg by mouth daily       order for DME     1 Device    Equipment being ordered: wall bars with screws, no suction       oxybutynin 10 MG 24 " hr tablet    DITROPAN-XL    60 tablet    TAKE TWO TABLETS (20MG) BY MOUTH DAILY. IF CONTINUED UNSATISFACTORY RESULTS MAY INCREASE TO MAX DOSE OF 30MG DAILY AFTER 1-2 WEEKS       piroxicam 20 MG capsule    FELDENE    30 capsule    Take 1 capsule (20 mg) by mouth daily (with breakfast)       polyethylene glycol powder    MIRALAX    510 g    Take 17 g (1 capful) by mouth daily       warfarin 5 MG tablet    COUMADIN     Take 7.5mg Sun, Wed. Take 5mg all other days of the week. Or as directed by INR clinic       * Notice:  This list has 2 medication(s) that are the same as other medications prescribed for you. Read the directions carefully, and ask your doctor or other care provider to review them with you.

## 2017-06-02 NOTE — LETTER
My Access Plan    Presenting Problem Signs and Symptoms Treatment Plan    Questions or concerns during clinic hours    I will call the clinic directly:                     [facility]    [address]               [place] [address]                  [phone]       Questions or concerns outside clinic hours    I will call the 24 hour nurse line at [phone] or [phone]-[phone]    Patient needs to schedule an appointment    I will call the 24 hour scheduling team at [phone] or clinic directly at [phone]    Same day treatment     I will call the clinic first, nurse line if after hours, urgent care and express care if needed   Clinic Care Coordinators (RN/Social Work):            [facility]      Carmela Garrett RN BSN N  [phone]    ALFREDO Prado W [phone]   Crisis Services: Behavioral or Mental Health    [facility] ([facility] Providers) [phone]    [facility] [phone]    [facility] [phone]    [facility] (24/7): [phone]       Emergency treatment--Immediately    CAll 391

## 2017-06-02 NOTE — NURSING NOTE
"Chief Complaint   Patient presents with     ER F/U     Chest pain       Initial /74 (BP Location: Right arm, Patient Position: Chair, Cuff Size: Adult Regular)  Pulse 76  Temp 99.2  F (37.3  C) (Oral)  Ht 4' 7.5\" (1.41 m)  Wt 166 lb (75.3 kg)  SpO2 92%  BMI 37.89 kg/m2 Estimated body mass index is 37.89 kg/(m^2) as calculated from the following:    Height as of this encounter: 4' 7.5\" (1.41 m).    Weight as of this encounter: 166 lb (75.3 kg).  Medication Reconciliation: complete   Sharri Fischer CMA       "

## 2017-06-06 ENCOUNTER — CARE COORDINATION (OUTPATIENT)
Dept: CARE COORDINATION | Facility: CLINIC | Age: 77
End: 2017-06-06

## 2017-06-06 NOTE — PROGRESS NOTES
"Clinic Care Coordination Contact  Care Team Conversations    Clinical Data:  Pt seen in the ED on 6/1 with c/o chest pain.  Pt had a f/u appointment with Dr. Amezquita at [facility] on 6/2.  Pt is currently open to [facility].  RN CC mailed out information on CC role to patient on 6/2.  An email was sent to IKE Milton CM with [facility] on 6/2 regarding above situation as well as concern noted that CC SW had attempted outreach to the patient on 4/28 with no response.  Pt requesting assistance with PCA services.  Received email response from Karine on Friday 6/2 that [facility] would look into home care SW f/u.  Home Care RN had referred pt to the ED on 6/1 d/t c/o chest pain.    Plan:  Email f/u sent to Karine requesting update on patient status.  Pt is currently listed as \"active\" with CC.    Carmela Garrett RN BSN, PHN RN Care Coordinator  [facility]  [email or web address]  [phone]  6/6/2017 11:13 AM        "

## 2017-06-09 NOTE — PROGRESS NOTES
"Clinic Care Coordination Contact  Care Team Conversations    Received email f/u from Karine RN RAFAEL with [facility]    \"I saw pt today and I spoke with daughter in law. She stated that pt does her ADLS and does not need PCA. Pt ER visit went well. No episode since she returned home. Plan to recert pt next week.\"    Pt remains open to [facility].  Pt is active with CC at this time.  Will plan to f/u with [facility] in 2-3 weeks.    Carmela Garrett RN BSN, PHN RN Care Coordinator  [facility]  [email or web address]  [phone]  6/9/2017 10:30 AM        "

## 2017-06-15 ENCOUNTER — ANTICOAGULATION THERAPY VISIT (OUTPATIENT)
Dept: NURSING | Facility: CLINIC | Age: 77
End: 2017-06-15
Payer: COMMERCIAL

## 2017-06-15 DIAGNOSIS — Z79.01 LONG-TERM (CURRENT) USE OF ANTICOAGULANTS: ICD-10-CM

## 2017-06-15 DIAGNOSIS — I82.409 DVT (DEEP VENOUS THROMBOSIS) (H): ICD-10-CM

## 2017-06-15 DIAGNOSIS — I26.99 PULMONARY EMBOLISM WITH INFARCTION (H): ICD-10-CM

## 2017-06-15 LAB — INR PPP: 3

## 2017-06-15 PROCEDURE — 99207 ZZC NO CHARGE NURSE ONLY: CPT | Performed by: INTERNAL MEDICINE

## 2017-06-15 NOTE — MR AVS SNAPSHOT
Tana Ramosherb   6/15/2017   Anticoagulation Therapy Visit    Description:  76 year old female   Provider:  Manuel Taylor MD   Department:  Fz Nurse           INR as of 6/15/2017     Today's INR 3.0      Anticoagulation Summary as of 6/15/2017     INR goal 2.0-3.0   Today's INR 3.0   Full instructions 5 mg every day   Next INR check 6/29/2017    Indications   DVT (deep venous thrombosis) (H) [I82.409]  Long-term (current) use of anticoagulants [Z79.01] [Z79.01]  Pulmonary embolism with infarction (H) [I26.99] [I26.99]         Contact Numbers     [facility]  Please call [phone] to cancel and/or reschedule your appointment   Please call [phone] with any problems or questions regarding your therapy.        June 2017 Details    Sun Mon Tue Wed Thu Fri Sat         1               2               3                 4               5               6               7               8               9               10                 11               12               13               14               15      5 mg   See details      16      5 mg         17      5 mg           18      5 mg         19      5 mg         20      5 mg         21      5 mg         22      5 mg         23      5 mg         24      5 mg           25      5 mg         26      5 mg         27      5 mg         28      5 mg         29            30                 Date Details   06/15 This INR check       Date of next INR:  6/29/2017         How to take your warfarin dose     To take:  5 mg Take 1 of the 5 mg tablets.

## 2017-06-15 NOTE — PROGRESS NOTES
ANTICOAGULATION FOLLOW-UP CLINIC VISIT    Patient Name:  Tana Caceres  Date:  6/15/2017  Contact Type:  Telephone/ Talisha    SUBJECTIVE:     Patient Findings     Positives No Problem Findings    Comments S/O:  Talisha from  home care calls with patients INR today of 3.0.  Tana Caceres is on Coumadin for DVT.  Current Coumadin dose is 5 mg daily.   Concerns today are: None Noted.    A/P: Tana Caceres's INR is Therapeutic  for his/her goal range of 2 - 3.  Reasons why INR is out of range may include:na  Recommended to have Tana Caceres remain on the same Coumadin dose and to have his/her INR rechecked in 2 weeks on 6/29.      Marina Mohr RN - BC               OBJECTIVE    INR   Date Value Ref Range Status   06/15/2017 3.0  Final       ASSESSMENT / PLAN  No question data found.  Anticoagulation Summary as of 6/15/2017     INR goal 2.0-3.0   Today's INR 3.0   Maintenance plan 5 mg (5 mg x 1) every day   Full instructions 5 mg every day   Weekly total 35 mg   Plan last modified Sadaf Michele RN (3/20/2017)   Next INR check 6/29/2017   Target end date Indefinite    Indications   DVT (deep venous thrombosis) (H) [I82.409]  Long-term (current) use of anticoagulants [Z79.01] [Z79.01]  Pulmonary embolism with infarction (H) [I26.99] [I26.99]         Anticoagulation Episode Summary     INR check location     Preferred lab     Send INR reminders to [facility]    Comments       Anticoagulation Care Providers     Provider Role Specialty Phone number    Manuel Taylor MD [facility] Internal Medicine [phone]            See the Encounter Report to view Anticoagulation Flowsheet and Dosing Calendar (Go to Encounters tab in chart review, and find the Anticoagulation Therapy Visit)    Dosage adjustment made based on physician directed care plan.    Marina Mohr RN

## 2017-06-22 DIAGNOSIS — Z53.9 DIAGNOSIS NOT YET DEFINED: Primary | ICD-10-CM

## 2017-06-22 PROCEDURE — G0179 MD RECERTIFICATION HHA PT: HCPCS | Performed by: INTERNAL MEDICINE

## 2017-06-23 DIAGNOSIS — Z53.9 DIAGNOSIS NOT YET DEFINED: Primary | ICD-10-CM

## 2017-06-23 PROCEDURE — G0180 MD CERTIFICATION HHA PATIENT: HCPCS | Performed by: INTERNAL MEDICINE

## 2017-06-29 ENCOUNTER — ANTICOAGULATION THERAPY VISIT (OUTPATIENT)
Dept: NURSING | Facility: CLINIC | Age: 77
End: 2017-06-29
Payer: COMMERCIAL

## 2017-06-29 DIAGNOSIS — Z79.01 LONG-TERM (CURRENT) USE OF ANTICOAGULANTS: ICD-10-CM

## 2017-06-29 DIAGNOSIS — I82.409 DVT (DEEP VENOUS THROMBOSIS) (H): ICD-10-CM

## 2017-06-29 DIAGNOSIS — I26.99 PULMONARY EMBOLISM WITH INFARCTION (H): ICD-10-CM

## 2017-06-29 LAB — INR PPP: 3.9

## 2017-06-29 PROCEDURE — 99207 ZZC NO CHARGE NURSE ONLY: CPT | Performed by: INTERNAL MEDICINE

## 2017-06-29 NOTE — PROGRESS NOTES
ANTICOAGULATION FOLLOW-UP CLINIC VISIT    Patient Name:  Tana Caceres  Date:  6/29/2017  Contact Type:  Telephone/ Karine    SUBJECTIVE:     Patient Findings     Positives No Problem Findings, Unexplained INR or factor level change    Comments S/O:  Karine from  home care calls with patient's INR today of 3.9.  Tana Caceres is on Coumadin for PE.  Current Coumadin dose is 5 mg daily.   Concerns today are: None Noted.    A/P: Tana Caceres's INR is Supratherapeutic  for his/her goal range of 2 - 3.  Reasons why INR is out of range may include:unknown  Recommended to have Tana Caceres decrease Coumadin dose to Hold today and resume 5 mg daily starting 6/30 and to have his/her INR rechecked in 1 week on 7/6.      Marina Mohr RN - BC               OBJECTIVE    INR   Date Value Ref Range Status   06/29/2017 3.9  Final       ASSESSMENT / PLAN  No question data found.  Anticoagulation Summary as of 6/29/2017     INR goal 2.0-3.0   Today's INR 3.9!   Maintenance plan 5 mg (5 mg x 1) every day   Full instructions 6/29: Hold; Otherwise 5 mg every day   Weekly total 35 mg   Plan last modified Sadaf Michele RN (3/20/2017)   Next INR check 7/6/2017   Target end date Indefinite    Indications   DVT (deep venous thrombosis) (H) [I82.409]  Long-term (current) use of anticoagulants [Z79.01] [Z79.01]  Pulmonary embolism with infarction (H) [I26.99] [I26.99]         Anticoagulation Episode Summary     INR check location     Preferred lab     Send INR reminders to [facility]    Comments       Anticoagulation Care Providers     Provider Role Specialty Phone number    Manuel Taylor MD [facility] Internal Medicine [phone]            See the Encounter Report to view Anticoagulation Flowsheet and Dosing Calendar (Go to Encounters tab in chart review, and find the Anticoagulation Therapy Visit)    Dosage adjustment made based on physician directed care plan.    Marina Mohr RN

## 2017-06-29 NOTE — MR AVS SNAPSHOT
Tana Caceres   6/29/2017   Anticoagulation Therapy Visit    Description:  76 year old female   Provider:  Manuel Taylor MD   Department:  Fz Nurse           INR as of 6/29/2017     Today's INR 3.9!      Anticoagulation Summary as of 6/29/2017     INR goal 2.0-3.0   Today's INR 3.9!   Full instructions 6/29: Hold; Otherwise 5 mg every day   Next INR check 7/6/2017    Indications   DVT (deep venous thrombosis) (H) [I82.409]  Long-term (current) use of anticoagulants [Z79.01] [Z79.01]  Pulmonary embolism with infarction (H) [I26.99] [I26.99]         Contact Numbers     [facility]  Please call [phone] to cancel and/or reschedule your appointment   Please call [phone] with any problems or questions regarding your therapy.        June 2017 Details    Sun Mon Tue Wed Thu Fri Sat         1               2               3                 4               5               6               7               8               9               10                 11               12               13               14               15               16               17                 18               19               20               21               22               23               24                 25               26               27               28               29      Hold   See details      30      5 mg           Date Details   06/29 This INR check               How to take your warfarin dose     To take:  5 mg Take 1 of the 5 mg tablets.    Hold Do not take your warfarin dose. See the Details table to the right for additional instructions.                July 2017 Details    Sun Mon Tue Wed Thu Fri Sat           1      5 mg           2      5 mg         3      5 mg         4      5 mg         5      5 mg         6            7               8                 9               10               11               12               13               14               15                 16               17                18               19               20               21               22                 23               24               25               26               27               28               29                 30               31                     Date Details   No additional details    Date of next INR:  7/6/2017         How to take your warfarin dose     To take:  5 mg Take 1 of the 5 mg tablets.

## 2017-06-30 ENCOUNTER — CARE COORDINATION (OUTPATIENT)
Dept: CARE COORDINATION | Facility: CLINIC | Age: 77
End: 2017-06-30

## 2017-06-30 NOTE — PROGRESS NOTES
Clinic Care Coordination Contact  Care Team Conversations    Email sent to Karine Hogan RN CM with [facility] requesting update on patient status.   Per previous CC encounter pt was open to [facility] services with plan for CC f/u in 3 weeks.       Carmela Garrett RN BSN, PHN RN Care Coordinator  [facility]  [email or web address]  [phone]  6/30/2017 11:04 AM

## 2017-07-05 DIAGNOSIS — I82.4Y9 DEEP VEIN THROMBOSIS (DVT) OF PROXIMAL LOWER EXTREMITY, UNSPECIFIED CHRONICITY, UNSPECIFIED LATERALITY (H): ICD-10-CM

## 2017-07-05 DIAGNOSIS — I27.82 OTHER CHRONIC PULMONARY EMBOLISM WITHOUT ACUTE COR PULMONALE (H): ICD-10-CM

## 2017-07-05 DIAGNOSIS — N32.81 OAB (OVERACTIVE BLADDER): ICD-10-CM

## 2017-07-06 ENCOUNTER — ANTICOAGULATION THERAPY VISIT (OUTPATIENT)
Dept: FAMILY MEDICINE | Facility: CLINIC | Age: 77
End: 2017-07-06
Payer: COMMERCIAL

## 2017-07-06 DIAGNOSIS — Z79.01 LONG-TERM (CURRENT) USE OF ANTICOAGULANTS: ICD-10-CM

## 2017-07-06 DIAGNOSIS — I82.409 DVT (DEEP VENOUS THROMBOSIS) (H): ICD-10-CM

## 2017-07-06 DIAGNOSIS — I26.99 PULMONARY EMBOLISM WITH INFARCTION (H): ICD-10-CM

## 2017-07-06 LAB — INR PPP: 4.5

## 2017-07-06 PROCEDURE — 99207 ZZC NO CHARGE NURSE ONLY: CPT | Performed by: INTERNAL MEDICINE

## 2017-07-06 RX ORDER — WARFARIN SODIUM 5 MG/1
TABLET ORAL
Qty: 100 TABLET | Refills: 0 | Status: SHIPPED | OUTPATIENT
Start: 2017-07-06 | End: 2017-10-05

## 2017-07-06 RX ORDER — OXYBUTYNIN CHLORIDE 10 MG/1
TABLET, EXTENDED RELEASE ORAL
Qty: 60 TABLET | Refills: 3 | Status: SHIPPED | OUTPATIENT
Start: 2017-07-06 | End: 2017-11-02

## 2017-07-06 NOTE — TELEPHONE ENCOUNTER
Prescription approved per [facility] Refill Protocol.  Marina Mohr, RN - BC

## 2017-07-06 NOTE — MR AVS SNAPSHOT
Tana Caceres   7/6/2017   Anticoagulation Therapy Visit    Description:  76 year old female   Provider:  Manuel Taylor MD   Department:  Fz Fp/Im/Peds           INR as of 7/6/2017     Today's INR 4.5!      Anticoagulation Summary as of 7/6/2017     INR goal 2.0-3.0   Today's INR 4.5!   Full instructions 7/6: Hold; 7/7: Hold; Otherwise 5 mg every day   Next INR check 7/13/2017    Indications   DVT (deep venous thrombosis) (H) [I82.409]  Long-term (current) use of anticoagulants [Z79.01] [Z79.01]  Pulmonary embolism with infarction (H) [I26.99] [I26.99]         July 2017 Details    Sun Mon Tue Wed Thu Fri Sat           1                 2               3               4               5               6      Hold   See details      7      Hold         8      5 mg           9      5 mg         10      5 mg         11      5 mg         12      5 mg         13            14               15                 16               17               18               19               20               21               22                 23               24               25               26               27               28               29                 30               31                     Date Details   07/06 This INR check       Date of next INR:  7/13/2017         How to take your warfarin dose     To take:  5 mg Take 1 of the 5 mg tablets.    Hold Do not take your warfarin dose. See the Details table to the right for additional instructions.

## 2017-07-06 NOTE — PROGRESS NOTES
ANTICOAGULATION FOLLOW-UP CLINIC VISIT    Patient Name:  Tana Caceres  Date:  7/6/2017  Contact Type:  Telephone/ Karine    SUBJECTIVE:     Patient Findings     Positives No Problem Findings, Unexplained INR or factor level change    Comments S/O:  Karine from  home care calls with patients INR today of 4.5.  Tana Caceres is on Coumadin for PE.  Current Coumadin dose is 5 mg daily (held on 6/29).   Concerns today are: None Noted.    A/P: Tana Caceres's INR is Supratherapeutic  for his/her goal range of 2 - 3.  Reasons why INR is out of range may include:unknown  Recommended to have Tana Caceres decrease Coumadin dose to Hold today and tomorrow then 5 mg daily and to have his/her INR rechecked in 1 week on 7/13.      Marina Mohr RN - BC               OBJECTIVE    INR   Date Value Ref Range Status   07/06/2017 4.5  Final       ASSESSMENT / PLAN  INR assessment SUPRA    Recheck INR In: 1 WEEK    INR Location Homecare INR      Anticoagulation Summary as of 7/6/2017     INR goal 2.0-3.0   Today's INR 4.5!   Maintenance plan 5 mg (5 mg x 1) every day   Full instructions 7/6: Hold; 7/7: Hold; Otherwise 5 mg every day   Weekly total 35 mg   Plan last modified Sadaf Michele RN (3/20/2017)   Next INR check 7/13/2017   Target end date Indefinite    Indications   DVT (deep venous thrombosis) (H) [I82.409]  Long-term (current) use of anticoagulants [Z79.01] [Z79.01]  Pulmonary embolism with infarction (H) [I26.99] [I26.99]         Anticoagulation Episode Summary     INR check location     Preferred lab     Send INR reminders to [facility]    Comments       Anticoagulation Care Providers     Provider Role Specialty Phone number    Manuel Taylor MD [facility] Internal Medicine [phone]            See the Encounter Report to view Anticoagulation Flowsheet and Dosing Calendar (Go to Encounters tab in chart review, and find the Anticoagulation Therapy Visit)    Dosage adjustment made based on physician directed  care plan.    Marina Mohr RN

## 2017-07-06 NOTE — TELEPHONE ENCOUNTER
Reason for call: med refill   Patient called regarding (reason for call): prescription-Oxybutynin and warfarin  Additional comments: only has a couple days left    Phone number to reach patient:  Other phone number:  [phone]*    Best Time:  anytime    Can we leave a detailed message on this number?  YES

## 2017-07-06 NOTE — TELEPHONE ENCOUNTER
Oxybutynin      Last Written Prescription Date: 4/27/17  Last Fill Quantity: 60,  # refills: 1   Last Office Visit with [facility], [facility] or [facility] prescribing provider: 6/2/17

## 2017-07-06 NOTE — TELEPHONE ENCOUNTER
oxybutynin (DITROPAN-XL) 10 MG 24 hr tablet      Last Written Prescription Date: 4-27-17  Last Fill Quantity: 60,  # refills: 1   Last Office Visit with [facility] [place], [facility] or [facility] prescribing provider: 6-2-17    warfarin (COUMADIN) 5 MG tablet  Last Written Prescription Date: ?  Last Fill Qty: ?, # refills: ?  Last Office Visit with [facility] [place], [facility] or [facility] prescribing provider: 6-2-17     Date and Result of Last PT/INR:   Lab Results   Component Value Date    INR 4.5 07/06/2017    INR 3.9 06/29/2017

## 2017-07-11 NOTE — PROGRESS NOTES
Clinic Care Coordination Contact  Care Team Conversations    Received email f/u from Karine [facility] RN confirming that patient remains open to [facility] for home RN services.  Will plan to f/u with Karine in 1-2 weeks.      Carmela Garrett, RN BSN, PHN RN Care Coordinator  [facility]  [email or web address]  [phone]  7/11/2017 9:54 AM

## 2017-07-13 ENCOUNTER — ANTICOAGULATION THERAPY VISIT (OUTPATIENT)
Dept: NURSING | Facility: CLINIC | Age: 77
End: 2017-07-13
Payer: COMMERCIAL

## 2017-07-13 DIAGNOSIS — Z79.01 LONG-TERM (CURRENT) USE OF ANTICOAGULANTS: ICD-10-CM

## 2017-07-13 DIAGNOSIS — I26.99 PULMONARY EMBOLISM WITH INFARCTION (H): ICD-10-CM

## 2017-07-13 LAB — INR PPP: 2.9

## 2017-07-13 PROCEDURE — 99207 ZZC NO CHARGE NURSE ONLY: CPT | Performed by: INTERNAL MEDICINE

## 2017-07-13 NOTE — PROGRESS NOTES
ANTICOAGULATION FOLLOW-UP CLINIC VISIT    Patient Name:  Tana Caceres  Date:  7/13/2017  Contact Type:  Telephone    SUBJECTIVE:     Patient Findings     Positives No Problem Findings           OBJECTIVE    INR   Date Value Ref Range Status   07/13/2017 2.9  Final       ASSESSMENT / PLAN  INR assessment THER    Recheck INR In: 1 WEEK    INR Location Clinic      Anticoagulation Summary as of 7/13/2017     INR goal 2.0-3.0   Today's INR 2.9   Maintenance plan 5 mg (5 mg x 1) every day   Full instructions 7/15: 2.5 mg; 7/18: 2.5 mg; 7/20: 2.5 mg; 7/22: 2.5 mg; Otherwise 5 mg every day   Weekly total 35 mg   Plan last modified Sadaf Michele RN (3/20/2017)   Next INR check 7/20/2017   Target end date Indefinite    Indications   DVT (deep venous thrombosis) (H) [I82.409]  Long-term (current) use of anticoagulants [Z79.01] [Z79.01]  Pulmonary embolism with infarction (H) [I26.99] [I26.99]         Anticoagulation Episode Summary     INR check location     Preferred lab     Send INR reminders to [facility]    Comments       Anticoagulation Care Providers     Provider Role Specialty Phone number    Manuel Taylor MD Responsible Internal Medicine [phone]            See the Encounter Report to view Anticoagulation Flowsheet and Dosing Calendar (Go to Encounters tab in chart review, and find the Anticoagulation Therapy Visit)    Dosage adjustment made based on physician directed care plan. Reviewed both bleeding and clotting signs and symptoms with patient this visit. Pt. has no further questions or concerns.  Will call with any changes to diet, medications, or missed doses at INR line [phone].    Reviewed dosing and recheck with Karine RN [facility].    Sadaf Michele, RN

## 2017-07-13 NOTE — MR AVS SNAPSHOT
Tana Ramosherb   7/13/2017   Anticoagulation Therapy Visit    Description:  76 year old female   Provider:  Manuel Taylor MD   Department:  Fz Nurse           INR as of 7/13/2017     Today's INR 2.9      Anticoagulation Summary as of 7/13/2017     INR goal 2.0-3.0   Today's INR 2.9   Full instructions 7/15: 2.5 mg; 7/18: 2.5 mg; 7/20: 2.5 mg; 7/22: 2.5 mg; Otherwise 5 mg every day   Next INR check 7/20/2017    Indications   DVT (deep venous thrombosis) (H) [I82.409]  Long-term (current) use of anticoagulants [Z79.01] [Z79.01]  Pulmonary embolism with infarction (H) [I26.99] [I26.99]         Contact Numbers     [facility]  Please call [phone] to cancel and/or reschedule your appointment   Please call [phone] with any problems or questions regarding your therapy.        July 2017 Details    Sun Mon Tue Wed Thu Fri Sat           1                 2               3               4               5               6               7               8                 9               10               11               12               13      5 mg   See details      14      5 mg         15      2.5 mg           16      5 mg         17      5 mg         18      2.5 mg         19      5 mg         20            21               22                 23               24               25               26               27               28               29                 30               31                     Date Details   07/13 This INR check       Date of next INR:  7/20/2017         How to take your warfarin dose     To take:  2.5 mg Take 0.5 of a 5 mg tablet.    To take:  5 mg Take 1 of the 5 mg tablets.

## 2017-07-20 ENCOUNTER — ANTICOAGULATION THERAPY VISIT (OUTPATIENT)
Dept: NURSING | Facility: CLINIC | Age: 77
End: 2017-07-20
Payer: COMMERCIAL

## 2017-07-20 DIAGNOSIS — I26.99 PULMONARY EMBOLISM WITH INFARCTION (H): ICD-10-CM

## 2017-07-20 DIAGNOSIS — I82.409 DVT (DEEP VENOUS THROMBOSIS) (H): ICD-10-CM

## 2017-07-20 DIAGNOSIS — Z79.01 LONG-TERM (CURRENT) USE OF ANTICOAGULANTS: ICD-10-CM

## 2017-07-20 LAB — INR PPP: 2.7

## 2017-07-20 PROCEDURE — 99207 ZZC NO CHARGE NURSE ONLY: CPT | Performed by: INTERNAL MEDICINE

## 2017-07-20 NOTE — PROGRESS NOTES
ANTICOAGULATION FOLLOW-UP CLINIC VISIT    Patient Name:  Tana Caceres  Date:  7/20/2017  Contact Type:  Telephone/ Airam    SUBJECTIVE:     Patient Findings     Positives No Problem Findings    Comments S/O:  Airam from  home care calls with patients INR today of 2.7.  Tana Caceres is on Coumadin for PE.  Current Coumadin dose is 2.5 mg Tue/Thu/Sat and 5 mg ROW.   Concerns today are: None Noted.    A/P: Tana Caceres's INR is Therapeutic  for his/her goal range of 2 - 3.  Reasons why INR is out of range may include:na  Recommended to have Tana Caceres remain on the same Coumadin dose and to have his/her INR rechecked in 1 week on 7/27.      Marina Mohr RN - BC               OBJECTIVE    INR   Date Value Ref Range Status   07/20/2017 2.7  Final       ASSESSMENT / PLAN  No question data found.  Anticoagulation Summary as of 7/20/2017     INR goal 2.0-3.0   Today's INR 2.7   Maintenance plan 2.5 mg (5 mg x 0.5) on Tue, Thu, Sat; 5 mg (5 mg x 1) all other days   Full instructions 7/20: 2.5 mg; 7/22: 2.5 mg; Otherwise 2.5 mg on Tue, Thu, Sat; 5 mg all other days   Weekly total 27.5 mg   Plan last modified Marina Mohr RN (7/20/2017)   Next INR check 7/27/2017   Target end date Indefinite    Indications   DVT (deep venous thrombosis) (H) [I82.409]  Long-term (current) use of anticoagulants [Z79.01] [Z79.01]  Pulmonary embolism with infarction (H) [I26.99] [I26.99]         Anticoagulation Episode Summary     INR check location     Preferred lab     Send INR reminders to [facility]    Comments       Anticoagulation Care Providers     Provider Role Specialty Phone number    Manuel Taylor MD [facility] Internal Medicine [phone]            See the Encounter Report to view Anticoagulation Flowsheet and Dosing Calendar (Go to Encounters tab in chart review, and find the Anticoagulation Therapy Visit)    Dosage adjustment made based on physician directed care plan.    Marina Mohr RN

## 2017-07-20 NOTE — MR AVS SNAPSHOT
Tana Ramosherb   7/20/2017   Anticoagulation Therapy Visit    Description:  76 year old female   Provider:  Manuel Taylor MD   Department:  [facility]           INR as of 7/20/2017     Today's INR 2.7      Anticoagulation Summary as of 7/20/2017     INR goal 2.0-3.0   Today's INR 2.7   Full instructions 7/20: 2.5 mg; 7/22: 2.5 mg; Otherwise 2.5 mg on Tue, Thu, Sat; 5 mg all other days   Next INR check 7/27/2017    Indications   DVT (deep venous thrombosis) (H) [I82.409]  Long-term (current) use of anticoagulants [Z79.01] [Z79.01]  Pulmonary embolism with infarction (H) [I26.99] [I26.99]         Contact Numbers     [facility]  Please call [phone] to cancel and/or reschedule your appointment   Please call [phone] with any problems or questions regarding your therapy.        July 2017 Details    Sun Mon Tue Wed Thu Fri Sat           1                 2               3               4               5               6               7               8                 9               10               11               12               13               14               15                 16               17               18               19               20      2.5 mg   See details      21      5 mg         22      2.5 mg           23      5 mg         24      5 mg         25      2.5 mg         26      5 mg         27            28               29                 30               31                     Date Details   07/20 This INR check       Date of next INR:  7/27/2017         How to take your warfarin dose     To take:  2.5 mg Take 0.5 of a 5 mg tablet.    To take:  5 mg Take 1 of the 5 mg tablets.

## 2017-07-27 ENCOUNTER — TRANSFERRED RECORDS (OUTPATIENT)
Dept: HEALTH INFORMATION MANAGEMENT | Facility: CLINIC | Age: 77
End: 2017-07-27

## 2017-07-27 ENCOUNTER — ANTICOAGULATION THERAPY VISIT (OUTPATIENT)
Dept: NURSING | Facility: CLINIC | Age: 77
End: 2017-07-27
Payer: COMMERCIAL

## 2017-07-27 DIAGNOSIS — Z79.01 LONG-TERM (CURRENT) USE OF ANTICOAGULANTS: ICD-10-CM

## 2017-07-27 DIAGNOSIS — I26.99 PULMONARY EMBOLISM WITH INFARCTION (H): ICD-10-CM

## 2017-07-27 DIAGNOSIS — I82.409 DVT (DEEP VENOUS THROMBOSIS) (H): ICD-10-CM

## 2017-07-27 LAB — INR PPP: 2.2

## 2017-07-27 PROCEDURE — 99207 ZZC NO CHARGE NURSE ONLY: CPT | Performed by: INTERNAL MEDICINE

## 2017-07-27 NOTE — MR AVS SNAPSHOT
Tana Caceres   7/27/2017   Anticoagulation Therapy Visit    Description:  76 year old female   Provider:  Manuel Taylor MD   Department:  [facility]           INR as of 7/27/2017     Today's INR 2.2      Anticoagulation Summary as of 7/27/2017     INR goal 2.0-3.0   Today's INR 2.2   Full instructions 2.5 mg on Tue, Thu, Sat; 5 mg all other days   Next INR check 8/3/2017    Indications   DVT (deep venous thrombosis) (H) [I82.409]  Long-term (current) use of anticoagulants [Z79.01] [Z79.01]  Pulmonary embolism with infarction (H) [I26.99] [I26.99]         Contact Numbers     [facility]  Please call [phone] to cancel and/or reschedule your appointment   Please call [phone] with any problems or questions regarding your therapy.        July 2017 Details    Sun Mon Tue Wed Thu Fri Sat           1                 2               3               4               5               6               7               8                 9               10               11               12               13               14               15                 16               17               18               19               20               21               22                 23               24               25               26               27      2.5 mg   See details      28      5 mg         29      2.5 mg           30      5 mg         31      5 mg               Date Details   07/27 This INR check               How to take your warfarin dose     To take:  2.5 mg Take 0.5 of a 5 mg tablet.    To take:  5 mg Take 1 of the 5 mg tablets.           August 2017 Details    Sun Mon Tue Wed Thu Fri Sat       1      2.5 mg         2      5 mg         3            4               5                 6               7               8               9               10               11               12                 13               14               15               16               17               18               19                  20               21               22               23               24               25               26                 27               28               29               30               31                  Date Details   No additional details    Date of next INR:  8/3/2017         How to take your warfarin dose     To take:  2.5 mg Take 0.5 of a 5 mg tablet.    To take:  5 mg Take 1 of the 5 mg tablets.

## 2017-07-27 NOTE — PROGRESS NOTES
ANTICOAGULATION FOLLOW-UP CLINIC VISIT    Patient Name:  Tana Caceres  Date:  7/27/2017  Contact Type:  Telephone/ Elías    SUBJECTIVE:     Patient Findings     Positives No Problem Findings    Comments S/O:  Elías from  home care calls with patients INR today of 2.2.  Tana Caceres is on Coumadin for PE.  Current Coumadin dose is 2.5 mg Tue/Thu/Sat and 5 mg ROW.   Concerns today are: None Noted.    A/P: Tana Caceres's INR is Therapeutic  for his/her goal range of 2 - 3.  Reasons why INR is out of range may include:na  Recommended to have Tana Caceres remain on the same Coumadin dose and to have his/her INR rechecked in 1 week on 8/3.      Marina Mohr RN - BC               OBJECTIVE    INR   Date Value Ref Range Status   07/27/2017 2.2  Final       ASSESSMENT / PLAN  No question data found.  Anticoagulation Summary as of 7/27/2017     INR goal 2.0-3.0   Today's INR 2.2   Maintenance plan 2.5 mg (5 mg x 0.5) on Tue, Thu, Sat; 5 mg (5 mg x 1) all other days   Full instructions 2.5 mg on Tue, Thu, Sat; 5 mg all other days   Weekly total 27.5 mg   No change documented Marina Mohr RN   Plan last modified Marina Mohr RN (7/20/2017)   Next INR check 8/3/2017   Target end date Indefinite    Indications   DVT (deep venous thrombosis) (H) [I82.409]  Long-term (current) use of anticoagulants [Z79.01] [Z79.01]  Pulmonary embolism with infarction (H) [I26.99] [I26.99]         Anticoagulation Episode Summary     INR check location     Preferred lab     Send INR reminders to [facility]    Comments       Anticoagulation Care Providers     Provider Role Specialty Phone number    Manuel Taylor MD [facility] Internal Medicine [phone]            See the Encounter Report to view Anticoagulation Flowsheet and Dosing Calendar (Go to Encounters tab in chart review, and find the Anticoagulation Therapy Visit)    Dosage adjustment made based on physician directed care plan.    Marina Mohr RN

## 2017-08-03 ENCOUNTER — ANTICOAGULATION THERAPY VISIT (OUTPATIENT)
Dept: NURSING | Facility: CLINIC | Age: 77
End: 2017-08-03

## 2017-08-03 DIAGNOSIS — I26.99 PULMONARY EMBOLISM WITH INFARCTION (H): ICD-10-CM

## 2017-08-03 DIAGNOSIS — I82.409 DVT (DEEP VENOUS THROMBOSIS) (H): ICD-10-CM

## 2017-08-03 DIAGNOSIS — Z79.01 LONG-TERM (CURRENT) USE OF ANTICOAGULANTS: ICD-10-CM

## 2017-08-03 LAB — INR PPP: 2

## 2017-08-03 NOTE — MR AVS SNAPSHOT
Tana Ramosherb   8/3/2017   Anticoagulation Therapy Visit    Description:  76 year old female   Provider:  Manuel Taylor MD   Department:  [facility]           INR as of 8/3/2017     Today's INR 2.0      Anticoagulation Summary as of 8/3/2017     INR goal 2.0-3.0   Today's INR 2.0   Full instructions 2.5 mg on Tue, Thu, Sat; 5 mg all other days   Next INR check 8/17/2017    Indications   DVT (deep venous thrombosis) (H) [I82.409]  Long-term (current) use of anticoagulants [Z79.01] [Z79.01]  Pulmonary embolism with infarction (H) [I26.99] [I26.99]         Contact Numbers     [facility]  Please call [phone] to cancel and/or reschedule your appointment   Please call [phone] with any problems or questions regarding your therapy.        August 2017 Details    Sun Mon Tue Wed Thu Fri Sat       1               2               3      2.5 mg   See details      4      5 mg         5      2.5 mg           6      5 mg         7      5 mg         8      2.5 mg         9      5 mg         10      2.5 mg         11      5 mg         12      2.5 mg           13      5 mg         14      5 mg         15      2.5 mg         16      5 mg         17            18               19                 20               21               22               23               24               25               26                 27               28               29               30               31                  Date Details   08/03 This INR check       Date of next INR:  8/17/2017         How to take your warfarin dose     To take:  2.5 mg Take 0.5 of a 5 mg tablet.    To take:  5 mg Take 1 of the 5 mg tablets.

## 2017-08-03 NOTE — PROGRESS NOTES
ANTICOAGULATION FOLLOW-UP CLINIC VISIT    Patient Name:  Tana Caceres  Date:  8/3/2017  Contact Type:  Telephone/ Karine [phone]    SUBJECTIVE:     Patient Findings     Positives No Problem Findings    Comments S/O:  Karine from  home care calls with patients INR today of 2.0.  Tana Caceres is on Coumadin for PE.  Current Coumadin dose is 2.5 mg Tue/Thu/Sat and 5 mg ROW.   Concerns today are: None Noted.    A/P: Tana Caceres's INR is Therapeutic  for his/her goal range of 2 - 3.  Reasons why INR is out of range may include:na  Recommended to have Tana Caceres remain on the same Coumadin dose and to have his/her INR rechecked in 1 week on 8/17.      Marina Mohr RN - BC               OBJECTIVE    INR   Date Value Ref Range Status   08/03/2017 2.0  Final       ASSESSMENT / PLAN  No question data found.  Anticoagulation Summary as of 8/3/2017     INR goal 2.0-3.0   Today's INR 2.0   Maintenance plan 2.5 mg (5 mg x 0.5) on Tue, Thu, Sat; 5 mg (5 mg x 1) all other days   Full instructions 2.5 mg on Tue, Thu, Sat; 5 mg all other days   Weekly total 27.5 mg   No change documented Marina Mohr RN   Plan last modified Marina Mohr RN (7/20/2017)   Next INR check 8/17/2017   Target end date Indefinite    Indications   DVT (deep venous thrombosis) (H) [I82.409]  Long-term (current) use of anticoagulants [Z79.01] [Z79.01]  Pulmonary embolism with infarction (H) [I26.99] [I26.99]         Anticoagulation Episode Summary     INR check location     Preferred lab     Send INR reminders to [facility]    Comments       Anticoagulation Care Providers     Provider Role Specialty Phone number    Manuel Taylor MD [facility] Internal Medicine [phone]            See the Encounter Report to view Anticoagulation Flowsheet and Dosing Calendar (Go to Encounters tab in chart review, and find the Anticoagulation Therapy Visit)    Dosage adjustment made based on physician directed care plan.    Marina Mohr RN

## 2017-08-07 NOTE — PROGRESS NOTES
SUBJECTIVE:                                                    Tana Caceres is a 76 year old female who presents to clinic today with her son (translating from [place]) and grandson for the following health issues:    Patient presents with:  Hypertension: BP check  Memory Loss     Memory loss-- The patient tried making guesses about days and what time it was about 3 months ago, but she is currently no longer making guesses and is less open compared to the three months. She is currently looking more sad and quiet. She is taking longer naps. Also stopped being as active as she was in the past. When asked why, she notes that she gets sick and tired of sitting on her chair. There has not been any loss of relatives or friends or traumatic events in her life within the last three months. She has never been treated for depression. She is not stumbling or demonstrating any obvious focal neurological deficits     Hypertension-- The patient was taken to the emergency room in [facility] because she had SOB. Saw Dr. Calvin Hawkins, pulmonologist, on 7/27. Denies headaches, chest pain, fevers chill, signs of acute illness, loss of appetite. For complete details please see care everywhere with notes from [facility]   BP Readings from Last 3 Encounters:   08/11/17 122/78   06/02/17 137/74   06/01/17 (!) 157/93     Problem list and histories reviewed & adjusted, as indicated.  Additional history: as documented    Patient Active Problem List   Diagnosis     Hypertension goal BP (blood pressure) < 140/90     DVT (deep venous thrombosis) (H)     Acquired hypothyroidism     Factor V Leiden (H)     Unilateral sensorineural hearing loss     Mixed hearing loss     Osteoporosis     History of nephrectomy     Adrenal mass, left (H)     Right pontine CVA (H)     DO (dyspnea on exertion)     Primary osteoarthritis of knee     Morbid obesity (H)     Physical deconditioning     Dysgeusia     Cataract     LAP-BAND  surgery status     Left adrenal mass (H)     Age-related osteoporosis without current pathological fracture     Hyperlipidemia LDL goal <100     Long term current use of anticoagulant therapy     Hyperglobulinemia     History of partial thyroidectomy     Microscopic hematuria     Persistent insomnia     MCI (mild cognitive impairment)     Other chronic pulmonary embolism without acute cor pulmonale (H)     Long-term (current) use of anticoagulants [Z79.01]     Pulmonary embolism with infarction (H) [I26.99]     Past Surgical History:   Procedure Laterality Date     C PARTIAL EXCISION THYROID,BILAT       CATARACT IOL, RT/LT  8/14/2014    Procedure: EXTRACTION, CATARACT, WITH INTRAOCULAR LENS INSERTION, POSTERIOR CHAMBER; Laterality: Right; Surgeon: Eufemia Chatman MD; Service: Ophthalmology     CATARACT IOL, RT/LT  11/10/2014    Procedure: EXTRACTION, CATARACT, WITH INTRAOCULAR LENS INSERTION, POSTERIOR CHAMBER; Laterality: Left; Surgeon: Gulshan Pickering MD; Service: Ophthalmology     NEPHRECTOMY  1980s     SURGICAL HISTORY OF -       R ear surgery     TYMPANOPLASTY  10/14/2014    Procedure: TYMPANOPLASTY; Laterality: Right; Surgeon: Stephen Abarca MD; Service: Otolaryngology     XR LAP BAND      uncertain       Social History   Substance Use Topics     Smoking status: Never Smoker     Smokeless tobacco: Never Used     Alcohol use No     Family History   Problem Relation Age of Onset     Hypertension Father      Glaucoma Father      Hypertension Mother      Hypertension Brother      Glaucoma Brother      Ovarian Cancer No family hx of      Cancer - colorectal No family hx of      Breast Cancer No family hx of          Current Outpatient Prescriptions   Medication Sig Dispense Refill     oxybutynin (DITROPAN-XL) 10 MG 24 hr tablet TAKE TWO TABLETS BY MOUTH DAILY.  IF AFTER 1-2 WEEKS YOUR RESULTS ARE UNSATISFACTORY, YOU MAY INCREASE TO 3 TABLETS DAILY 60 tablet 3     warfarin (COUMADIN) 5 MG tablet Take 7.5mg Sun,  Wed. Take 5mg all other days of the week. Or as directed by INR clinic 100 tablet 0     cholecalciferol (VITAMIN D) 1000 UNIT tablet Take 1 tablet (1,000 Units) by mouth daily 90 tablet 3     amLODIPine (NORVASC) 5 MG tablet Take 5 mg by mouth       atorvastatin (LIPITOR) 20 MG tablet Take 20 mg by mouth       aspirin 81 MG tablet Take 1 tablet (81 mg) by mouth daily 30 tablet 11     order for DME Equipment being ordered: wall bars with screws, no suction 1 Device 0     polyethylene glycol (MIRALAX) powder Take 17 g (1 capful) by mouth daily 510 g 1     piroxicam (FELDENE) 20 MG capsule Take 1 capsule (20 mg) by mouth daily (with breakfast) 30 capsule 1     LEVOTHYROXINE SODIUM PO Take 75 mcg by mouth daily       ENALAPRIL MALEATE PO Take 20 mg by mouth       ACETAMINOPHEN PO Take 500 mg by mouth every 6 hours as needed for pain       acetaminophen (TYLENOL) 325 MG tablet Take 2 tablets (650 mg) by mouth every 6 hours as needed for mild pain 100 tablet 1     Labs reviewed in EPIC      Reviewed and updated as needed this visit by clinical staff  Tobacco  Allergies  Meds  Med Hx  Surg Hx  Fam Hx  Soc Hx      Reviewed and updated as needed this visit by Provider         ROS:  Constitutional, HEENT, cardiovascular, pulmonary, GI, , musculoskeletal, neuro, skin, endocrine and psych systems are negative, except as otherwise noted.    This document serves as a record of the services and decisions personally performed and made by Manuel Taylor MD. It was created on their behalf by Sobia Eddy and Hi Nesbitt, a trained medical scribe. The creation of this document is based the provider's statements to the medical scribe.  Sobia Eddy and Hi Nesbitt August 11, 2017 2:17 PM    OBJECTIVE:   /78  Pulse 73  Temp 97.4  F (36.3  C) (Oral)  Wt 69.9 kg (154 lb 3.2 oz)  SpO2 97%  BMI 35.2 kg/m2  Body mass index is 35.2 kg/(m^2).  GENERAL: morbidly obese, alert and no distress , wearing a  traditional hijab   EYES: Eyes grossly normal to inspection, EOMI, conjunctivae and sclerae normal  SKIN: no suspicious lesions or rashes to visible skin   NEURO: mentation intact and speech normal  PSYCH: mentation appears normal, affect flat, basically is non-verbal at this point     Diagnostic Test Results:  Results for orders placed or performed in visit on 08/03/17   INR   Result Value Ref Range    INR 2.0      ASSESSMENT/PLAN:     (Z23) Need for prophylactic vaccination with tetanus-diphtheria (TD)  (primary encounter diagnosis)  Comment: declines   Plan: as above     (F09) Cognitive dysfunction  Comment: today presented a serious challenge as there are many possibilities here at patient is not telling me in any clear sense what is the problem. Depression is clearly in the differential diagnosis as pseudo-dementia can present this way as well as a cognitive dysfunction / emerging dementia . We considered options and the son wants to try to proceed with neuropsychiatric tests although this may prove challenging patient because of the need for an   Plan: NEUROPSYCHOLOGY REFERRAL              Patient Instructions     . We will look into neuropsychiatric testing (with an )    . [facility]    If you have any questions regarding to your visit please contact your care team:     Team Pink:   Clinic Hours Telephone Number   Internal Medicine:  Dr. Pat Styles, NP       7am-7pm  Monday - Thursday   7am-5pm  Fridays  [phone]  (Appointment scheduling available 24/7)    Questions about your visit?  Team Line  [phone]   Urgent Care - [place] - 11am-9pm Monday-Friday Saturday-Sunday- 9am-5pm   [place] - 5pm-9pm Monday-Friday Saturday-Sunday- 9am-5pm  [phone] - [place]   [phone] - [place]       What options do I have for visits at the clinic other than the traditional office  visit?  To expand how we care for you, many of our providers are utilizing electronic visits (e-visits) and telephone visits, when medically appropriate, for interactions with their patients rather than a visit in the clinic.   We also offer nurse visits for many medical concerns. Just like any other service, we will bill your insurance company for this type of visit based on time spent on the phone with your provider. Not all insurance companies cover these visits. Please check with your medical insurance if this type of visit is covered. You will be responsible for any charges that are not paid by your insurance.      E-visits via [employer]:  generally incur a $35.00 fee.  Telephone visits:  Time spent on the phone: *charged based on time that is spent on the phone in increments of 10 minutes. Estimated cost:   5-10 mins $30.00   11-20 mins. $59.00   21-30 mins. $85.00   Use [employer] (secure email communication and access to your chart) to send your primary care provider a message or make an appointment. Ask someone on your Team how to sign up for [employer].    For a Price Quote for your services, please call our Consumer Price Line at [phone].    As always, Thank you for trusting us with your health care needs!    Discharged by Bianca GAY CMA ([facility])      The information in this document, created by the medical scribe for me, accurately reflects the services I personally performed and the decisions made by me. I have reviewed and approved this document for accuracy.   MD Manuel Valles MD  [facility]

## 2017-08-11 ENCOUNTER — OFFICE VISIT (OUTPATIENT)
Dept: INTERNAL MEDICINE | Facility: CLINIC | Age: 77
End: 2017-08-11
Payer: COMMERCIAL

## 2017-08-11 VITALS
BODY MASS INDEX: 35.2 KG/M2 | HEART RATE: 73 BPM | WEIGHT: 154.2 LBS | TEMPERATURE: 97.4 F | DIASTOLIC BLOOD PRESSURE: 78 MMHG | SYSTOLIC BLOOD PRESSURE: 122 MMHG | OXYGEN SATURATION: 97 %

## 2017-08-11 DIAGNOSIS — F09 COGNITIVE DYSFUNCTION: ICD-10-CM

## 2017-08-11 DIAGNOSIS — Z23 NEED FOR PROPHYLACTIC VACCINATION WITH TETANUS-DIPHTHERIA (TD): Primary | ICD-10-CM

## 2017-08-11 PROCEDURE — 99214 OFFICE O/P EST MOD 30 MIN: CPT | Performed by: INTERNAL MEDICINE

## 2017-08-11 NOTE — PATIENT INSTRUCTIONS
. We will look into neuropsychiatric testing (with an )    . [facility]    If you have any questions regarding to your visit please contact your care team:     Team Pink:   Clinic Hours Telephone Number   Internal Medicine:  Dr. Pat Styles NP       7am-7pm  Monday - Thursday   7am-5pm  Fridays  [phone]  (Appointment scheduling available 24/7)    Questions about your visit?  Team Line  [phone]   Urgent Care - [place] - 11am-9pm Monday-Friday Saturday-Sunday- 9am-5pm   [place] - 5pm-9pm Monday-Friday Saturday-Sunday- 9am-5pm  [phone] - [place]   [phone] - [place]       What options do I have for visits at the clinic other than the traditional office visit?  To expand how we care for you, many of our providers are utilizing electronic visits (e-visits) and telephone visits, when medically appropriate, for interactions with their patients rather than a visit in the clinic.   We also offer nurse visits for many medical concerns. Just like any other service, we will bill your insurance company for this type of visit based on time spent on the phone with your provider. Not all insurance companies cover these visits. Please check with your medical insurance if this type of visit is covered. You will be responsible for any charges that are not paid by your insurance.      E-visits via [employer]:  generally incur a $35.00 fee.  Telephone visits:  Time spent on the phone: *charged based on time that is spent on the phone in increments of 10 minutes. Estimated cost:   5-10 mins $30.00   11-20 mins. $59.00   21-30 mins. $85.00   Use [employer] (secure email communication and access to your chart) to send your primary care provider a message or make an appointment. Ask someone on your Team how to sign up for [employer].    For a Price Quote for your services, please call our Consumer Price Line at  [phone].    As always, Thank you for trusting us with your health care needs!    Discharged by Bianca GAY CMA ([facility])

## 2017-08-11 NOTE — NURSING NOTE
"Chief Complaint   Patient presents with     Hypertension     BP check     Memory Loss       Initial /78  Pulse 73  Temp 97.4  F (36.3  C) (Oral)  Wt 154 lb 3.2 oz (69.9 kg)  SpO2 97%  BMI 35.2 kg/m2 Estimated body mass index is 35.2 kg/(m^2) as calculated from the following:    Height as of 6/2/17: 4' 7.5\" (1.41 m).    Weight as of this encounter: 154 lb 3.2 oz (69.9 kg).  Medication Reconciliation: complete   Bianca GAY CMA ([facility])      "

## 2017-08-11 NOTE — MR AVS SNAPSHOT
After Visit Summary   8/11/2017    Tana Caceres    MRN: [id number]           Patient Information     Date Of Birth          1940        Visit Information        Provider Department      8/11/2017 2:10 PM Manuel Taylor MD [facility]        Today's Diagnoses     Need for prophylactic vaccination with tetanus-diphtheria (TD)    -  1    Cognitive dysfunction          Care Instructions    . We will look into neuropsychiatric testing (with an )    . [facility]    If you have any questions regarding to your visit please contact your care team:     Team Pink:   Clinic Hours Telephone Number   Internal Medicine:  Dr. Pat Styles NP       7am-7pm  Monday - Thursday   7am-5pm  Fridays  [phone]  (Appointment scheduling available 24/7)    Questions about your visit?  Team Line  [phone]   [facility] [facility] - 11am-9pm Monday-Friday Saturday-Sunday- 9am-5pm   [place] - 5pm-9pm Monday-Friday Saturday-Sunday- 9am-5pm  [phone] - [facility]  [phone] - [place]       What options do I have for visits at the clinic other than the traditional office visit?  To expand how we care for you, many of our providers are utilizing electronic visits (e-visits) and telephone visits, when medically appropriate, for interactions with their patients rather than a visit in the clinic.   We also offer nurse visits for many medical concerns. Just like any other service, we will bill your insurance company for this type of visit based on time spent on the phone with your provider. Not all insurance companies cover these visits. Please check with your medical insurance if this type of visit is covered. You will be responsible for any charges that are not paid by your insurance.      E-visits via [employer]):  generally incur a $35.00 fee.  Telephone visits:  Time spent on the phone: *charged based  on time that is spent on the phone in increments of 10 minutes. Estimated cost:   5-10 mins $30.00   11-20 mins. $59.00   21-30 mins. $85.00   Use Volumentalhart (secure email communication and access to your chart) to send your primary care provider a message or make an appointment. Ask someone on your Team how to sign up for [employer].    For a Price Quote for your services, please call our [employer] Price Line at [phone].    As always, Thank you for trusting us with your health care needs!    Discharged by Bianca AGY CMA ([facility])            Follow-ups after your visit        Additional Services     NEUROPSYCHOLOGY REFERRAL       Your provider has referred you to:    [facility]: Adult Neuropsychology [facility] - [place] [phone] Preferred Provider: Phill Nobles PhD, LP   [email or web address]    All scheduling is subject to the client's specific insurance plan & benefits, provider/location availability, and provider clinical specialities.  Please arrive 15 minutes early for your first appointment and bring your completed paperwork.    Please be aware that coverage of these services is subject to the terms and limitations of your health insurance plan.  Call member services at your health plan with any benefit or coverage questions.    Please bring the following to your appointment:  >>   Any x-rays, CTs or MRIs which have been performed.  Contact the facility where they were done to arrange for  prior to your scheduled appointment.  Any new CT, MRI or other procedures ordered by your specialist must be performed at a [place] facility or coordinated by your clinic's referral office.    >>   List of current medications   >>   This referral request   >>   Any documents/labs given to you for this referral                  Who to contact     If you have questions or need follow up information about today's clinic visit or your schedule please contact [facility] [place] directly at  [phone].  Normal or non-critical lab and imaging results will be communicated to you by MyChart, letter or phone within 4 business days after the clinic has received the results. If you do not hear from us within 7 days, please contact the clinic through Konyhart or phone. If you have a critical or abnormal lab result, we will notify you by phone as soon as possible.  Submit refill requests through [employer]on or call your pharmacy and they will forward the refill request to us. Please allow 3 business days for your refill to be completed.          Additional Information About Your Visit        Konyhart Information     Vpon gives you secure access to your electronic health record. If you see a primary care provider, you can also send messages to your care team and make appointments. If you have questions, please call your primary care clinic.  If you do not have a primary care provider, please call [phone] and they will assist you.        Care EveryWhere ID     This is your Care EveryWhere ID. This could be used by other organizations to access your [place] medical records  [id number]        Your Vitals Were     Pulse Temperature Pulse Oximetry BMI (Body Mass Index)          73 97.4  F (36.3  C) (Oral) 97% 35.2 kg/m2         Blood Pressure from Last 3 Encounters:   08/11/17 122/78   06/02/17 137/74   06/01/17 (!) 157/93    Weight from Last 3 Encounters:   08/11/17 154 lb 3.2 oz (69.9 kg)   06/02/17 166 lb (75.3 kg)   12/22/16 175 lb (79.4 kg)              We Performed the Following     NEUROPSYCHOLOGY REFERRAL        Primary Care Provider Office Phone # Fax #    Manuel Taylor [phone] [address]  [place] [address]        Equal Access to Services     HAROON BLANCHARD : Devin Epps, waizabel anguiano, qaybta kaalmaangelina nava, ro villalba. So [facility] [phone].    ATENCIÓN: Si habla español, tiene a akhtar disposición servicios  morales de asistencia lingüística. Justin aldrich [phone].    We comply with applicable federal civil rights laws and [place] laws. We do not discriminate on the basis of race, color, national origin, age, disability sex, sexual orientation or gender identity.            Thank you!     Thank you for choosing [facility]  for your care. Our goal is always to provide you with excellent care. Hearing back from our patients is one way we can continue to improve our services. Please take a few minutes to complete the written survey that you may receive in the mail after your visit with us. Thank you!             Your Updated Medication List - Protect others around you: Learn how to safely use, store and throw away your medicines at www.disposemymeds.org.          This list is accurate as of: 8/11/17  2:34 PM.  Always use your most recent med list.                   Brand Name Dispense Instructions for use Diagnosis    * ACETAMINOPHEN PO      Take 500 mg by mouth every 6 hours as needed for pain        * acetaminophen 325 MG tablet    TYLENOL    100 tablet    Take 2 tablets (650 mg) by mouth every 6 hours as needed for mild pain    DDD (degenerative disc disease), cervical       amLODIPine 5 MG tablet    NORVASC     Take 5 mg by mouth        aspirin 81 MG tablet     30 tablet    Take 1 tablet (81 mg) by mouth daily        atorvastatin 20 MG tablet    LIPITOR     Take 20 mg by mouth        cholecalciferol 1000 UNIT tablet    vitamin D    90 tablet    Take 1 tablet (1,000 Units) by mouth daily    Hypovitaminosis D       ENALAPRIL MALEATE PO      Take 20 mg by mouth        LEVOTHYROXINE SODIUM PO      Take 75 mcg by mouth daily        order for DME     1 Device    Equipment being ordered: wall bars with screws, no suction        oxybutynin 10 MG 24 hr tablet    DITROPAN-XL    60 tablet    TAKE TWO TABLETS BY MOUTH DAILY.  IF AFTER 1-2 WEEKS YOUR RESULTS ARE UNSATISFACTORY, YOU MAY INCREASE TO 3 TABLETS DAILY     OAB (overactive bladder)       piroxicam 20 MG capsule    FELDENE    30 capsule    Take 1 capsule (20 mg) by mouth daily (with breakfast)    Primary osteoarthritis of both knees       polyethylene glycol powder    MIRALAX    510 g    Take 17 g (1 capful) by mouth daily    Chronic idiopathic constipation       warfarin 5 MG tablet    COUMADIN    100 tablet    Take 7.5mg Sun, Wed. Take 5mg all other days of the week. Or as directed by INR clinic    Deep vein thrombosis (DVT) of proximal lower extremity, unspecified chronicity, unspecified laterality (H), Other chronic pulmonary embolism without acute cor pulmonale (H)       * Notice:  This list has 2 medication(s) that are the same as other medications prescribed for you. Read the directions carefully, and ask your doctor or other care provider to review them with you.

## 2017-08-17 ENCOUNTER — ANTICOAGULATION THERAPY VISIT (OUTPATIENT)
Dept: NURSING | Facility: CLINIC | Age: 77
End: 2017-08-17

## 2017-08-17 ENCOUNTER — TELEPHONE (OUTPATIENT)
Dept: INTERNAL MEDICINE | Facility: CLINIC | Age: 77
End: 2017-08-17

## 2017-08-17 DIAGNOSIS — I26.99 PULMONARY EMBOLISM WITH INFARCTION (H): ICD-10-CM

## 2017-08-17 DIAGNOSIS — Z79.01 LONG-TERM (CURRENT) USE OF ANTICOAGULANTS: ICD-10-CM

## 2017-08-17 DIAGNOSIS — R41.3 POOR MEMORY: Primary | ICD-10-CM

## 2017-08-17 DIAGNOSIS — R68.89 FORGETFULNESS: ICD-10-CM

## 2017-08-17 DIAGNOSIS — I82.409 DVT (DEEP VENOUS THROMBOSIS) (H): ICD-10-CM

## 2017-08-17 LAB — INR POINT OF CARE: 2.9 (ref 0.86–1.14)

## 2017-08-17 NOTE — PROGRESS NOTES
ANTICOAGULATION FOLLOW-UP CLINIC VISIT    Patient Name:  Tana Caceres  Date:  8/17/2017  Contact Type:  Telephone/ Karine    SUBJECTIVE:     Patient Findings     Positives No Problem Findings    Comments S/O:  Karine from  home care calls with patients INR today of 2.9.  Tana Caceres is on Coumadin for PE.  Current Coumadin dose is 2.5 mg Tue/The/Sat and 5 mg ROW.   Concerns today are: Verbal orders given for Home Care re certification to see patient every 2 weeks to check INR.    A/P: Tana Caceres's INR is Therapeutic  for his/her goal range of 2 - 3.  Reasons why INR is out of range may include:na  Recommended to have Tana Caceres remain on the same Coumadin dose and to have his/her INR rechecked in 2 weeks on 8/31.      Marina Mohr RN - BC               OBJECTIVE    INR Protime   Date Value Ref Range Status   08/17/2017 2.9 (A) 0.86 - 1.14 Final       ASSESSMENT / PLAN  No question data found.  Anticoagulation Summary as of 8/17/2017     INR goal 2.0-3.0   Today's INR 2.9   Maintenance plan 2.5 mg (5 mg x 0.5) on Tue, Thu, Sat; 5 mg (5 mg x 1) all other days   Full instructions 2.5 mg on Tue, Thu, Sat; 5 mg all other days   Weekly total 27.5 mg   Plan last modified Marina Mohr, RN (7/20/2017)   Next INR check 8/31/2017   Target end date Indefinite    Indications   DVT (deep venous thrombosis) (H) [I82.409]  Long-term (current) use of anticoagulants [Z79.01] [Z79.01]  Pulmonary embolism with infarction (H) [I26.99] [I26.99]         Anticoagulation Episode Summary     INR check location     Preferred lab     Send INR reminders to [facility]    Comments       Anticoagulation Care Providers     Provider Role Specialty Phone number    Mnauel Taylor MD [facility] Internal Medicine [phone]            See the Encounter Report to view Anticoagulation Flowsheet and Dosing Calendar (Go to Encounters tab in chart review, and find the Anticoagulation Therapy Visit)    Dosage adjustment made based on  physician directed care plan.    Marina Mohr RN

## 2017-08-17 NOTE — TELEPHONE ENCOUNTER
I approve of this, automated pill dispenser . If a prescription for durable medical equipment or supplies is necessary please place orders as requested and reroute for signing     Manuel Taylor MD

## 2017-08-17 NOTE — MR AVS SNAPSHOT
Tana Ramosherb   8/17/2017   Anticoagulation Therapy Visit    Description:  76 year old female   Provider:  Manuel Taylor MD   Department:  [facility] Nurse           INR as of 8/17/2017     Today's INR 2.9      Anticoagulation Summary as of 8/17/2017     INR goal 2.0-3.0   Today's INR 2.9   Full instructions 2.5 mg on Tue, Thu, Sat; 5 mg all other days   Next INR check 8/31/2017    Indications   DVT (deep venous thrombosis) (H) [I82.409]  Long-term (current) use of anticoagulants [Z79.01] [Z79.01]  Pulmonary embolism with infarction (H) [I26.99] [I26.99]         Contact Numbers     [facility]  Please call [phone] to cancel and/or reschedule your appointment   Please call [phone] with any problems or questions regarding your therapy.        August 2017 Details    Sun Mon Tue Wed Thu Fri Sat       1               2               3               4               5                 6               7               8               9               10               11               12                 13               14               15               16               17      2.5 mg   See details      18      5 mg         19      2.5 mg           20      5 mg         21      5 mg         22      2.5 mg         23      5 mg         24      2.5 mg         25      5 mg         26      2.5 mg           27      5 mg         28      5 mg         29      2.5 mg         30      5 mg         31               Date Details   08/17 This INR check       Date of next INR:  8/31/2017         How to take your warfarin dose     To take:  2.5 mg Take 0.5 of a 5 mg tablet.    To take:  5 mg Take 1 of the 5 mg tablets.

## 2017-08-17 NOTE — TELEPHONE ENCOUNTER
Reason for Call:  Other call back    Detailed comments: Karine states patient is requesting a medication dispenser that will remind patient to take her meds due to poor memory and forgetfulness. She called [facility] to determine eligibility and they stated that a doctor has to send him a authorization to [facility] for the machine and a reason why patient needs one. Call with an update please.     Phone Number can be reached at: Other phone number:  [phone]    Best Time: any    Can we leave a detailed message on this number? YES    Call taken on 8/17/2017 at 11:35 AM by Escobar Robledo

## 2017-08-17 NOTE — TELEPHONE ENCOUNTER
[employer] order placed.    This will drop into the [employer] team's basket and they will coordinate with patient and their insurance company for the supply    Karine notified via detailed VM    Bennett Shaw RN

## 2017-08-22 DIAGNOSIS — Z53.9 DIAGNOSIS NOT YET DEFINED: Primary | ICD-10-CM

## 2017-08-22 PROCEDURE — G0179 MD RECERTIFICATION HHA PT: HCPCS | Performed by: INTERNAL MEDICINE

## 2017-08-31 ENCOUNTER — ANTICOAGULATION THERAPY VISIT (OUTPATIENT)
Dept: NURSING | Facility: CLINIC | Age: 77
End: 2017-08-31

## 2017-08-31 DIAGNOSIS — Z79.01 LONG-TERM (CURRENT) USE OF ANTICOAGULANTS: ICD-10-CM

## 2017-08-31 DIAGNOSIS — I26.99 PULMONARY EMBOLISM WITH INFARCTION (H): ICD-10-CM

## 2017-08-31 DIAGNOSIS — I82.409 DVT (DEEP VENOUS THROMBOSIS) (H): ICD-10-CM

## 2017-08-31 LAB — INR PPP: 1

## 2017-08-31 NOTE — PROGRESS NOTES
ANTICOAGULATION FOLLOW-UP CLINIC VISIT    Patient Name:  Tana Caceres  Date:  8/31/2017  Contact Type:  Telephone/ Rupal    SUBJECTIVE:     Patient Findings     Positives No Problem Findings (.hc), Unexplained INR or factor level change    Comments S/O:  Rupal from  home care calls with patients INR today of 1.0.  Tana Caceres is on Coumadin for PE.  Current Coumadin dose is 2.5 mg Tue/Thu/Sat and 5 mg ROW.   Concerns today are: None Noted.    A/P: Tana Caceres's INR is Subtherapeutic  for his/her goal range of 2 - 3.  Reasons why INR is out of range may include:Patient is fasting from 5a-8p. Denies missing doses or medication changes  Recommended to have Tana Caceres increase Coumadin dose to 5 mg 8/31; 10 mg 9/1, 2.5 mg 9/2, and 5 mg ROW and to have his/her INR rechecked in 5 days on 9/5.      Marina Mohr RN - BC               OBJECTIVE    INR   Date Value Ref Range Status   08/31/2017 1.0  Final       ASSESSMENT / PLAN  No question data found.  Anticoagulation Summary as of 8/31/2017     INR goal 2.0-3.0   Today's INR 1.0!   Maintenance plan 2.5 mg (5 mg x 0.5) on Tue, Thu, Sat; 5 mg (5 mg x 1) all other days   Full instructions 8/31: 5 mg; 9/1: 10 mg; Otherwise 2.5 mg on Tue, Thu, Sat; 5 mg all other days   Weekly total 27.5 mg   Plan last modified Marina Mohr RN (7/20/2017)   Next INR check 9/5/2017   Target end date Indefinite    Indications   DVT (deep venous thrombosis) (H) [I82.409]  Long-term (current) use of anticoagulants [Z79.01] [Z79.01]  Pulmonary embolism with infarction (H) [I26.99] [I26.99]         Anticoagulation Episode Summary     INR check location     Preferred lab     Send INR reminders to [facility]    Comments       Anticoagulation Care Providers     Provider Role Specialty Phone number    Manuel Taylor MD [facility] Internal Medicine [phone]            See the Encounter Report to view Anticoagulation Flowsheet and Dosing Calendar (Go to Encounters tab in chart review,  and find the Anticoagulation Therapy Visit)    Dosage adjustment made based on physician directed care plan.    Marina Mohr RN

## 2017-08-31 NOTE — MR AVS SNAPSHOT
Tana Caceres   8/31/2017   Anticoagulation Therapy Visit    Description:  76 year old female   Provider:  Manuel Taylor MD   Department:  [facility] Nurse           INR as of 8/31/2017     Today's INR 1.0!      Anticoagulation Summary as of 8/31/2017     INR goal 2.0-3.0   Today's INR 1.0!   Full instructions 8/31: 5 mg; 9/1: 10 mg; Otherwise 2.5 mg on Tue, Thu, Sat; 5 mg all other days   Next INR check 9/5/2017    Indications   DVT (deep venous thrombosis) (H) [I82.409]  Long-term (current) use of anticoagulants [Z79.01] [Z79.01]  Pulmonary embolism with infarction (H) [I26.99] [I26.99]         Contact Numbers     [facility]  Please call [phone] to cancel and/or reschedule your appointment   Please call [phone] with any problems or questions regarding your therapy.        August 2017 Details    Sun Mon Tue Wed Thu Fri Sat       1               2               3               4               5                 6               7               8               9               10               11               12                 13               14               15               16               17               18               19                 20               21               22               23               24               25               26                 27               28               29               30               31      5 mg   See details         Date Details   08/31 This INR check               How to take your warfarin dose     To take:  5 mg Take 1 of the 5 mg tablets.           September 2017 Details    Sun Mon Tue Wed Thu Fri Sat          1      10 mg         2      2.5 mg           3      5 mg         4      5 mg         5            6               7               8               9                 10               11               12               13               14               15               16                 17               18               19               20                21               22               23                 24               25               26               27               28               29               30                Date Details   No additional details    Date of next INR:  9/5/2017         How to take your warfarin dose     To take:  2.5 mg Take 0.5 of a 5 mg tablet.    To take:  5 mg Take 1 of the 5 mg tablets.    To take:  10 mg Take 2 of the 5 mg tablets.

## 2017-09-05 ENCOUNTER — ANTICOAGULATION THERAPY VISIT (OUTPATIENT)
Dept: NURSING | Facility: CLINIC | Age: 77
End: 2017-09-05

## 2017-09-05 DIAGNOSIS — I26.99 PULMONARY EMBOLISM WITH INFARCTION (H): ICD-10-CM

## 2017-09-05 DIAGNOSIS — Z79.01 LONG-TERM (CURRENT) USE OF ANTICOAGULANTS: ICD-10-CM

## 2017-09-05 DIAGNOSIS — I82.409 DVT (DEEP VENOUS THROMBOSIS) (H): ICD-10-CM

## 2017-09-05 LAB — INR PPP: 1.4

## 2017-09-05 NOTE — MR AVS SNAPSHOT
Tana Caceres   9/5/2017   Anticoagulation Therapy Visit    Description:  76 year old female   Provider:  Manuel Taylor MD   Department:  [facility] Nurse           INR as of 9/5/2017     Today's INR 1.4!      Anticoagulation Summary as of 9/5/2017     INR goal 2.0-3.0   Today's INR 1.4!   Full instructions 9/5: 5 mg; 9/7: 5 mg; 9/9: 5 mg; Otherwise 2.5 mg on Tue, Thu, Sat; 5 mg all other days   Next INR check 9/14/2017    Indications   DVT (deep venous thrombosis) (H) [I82.409]  Long-term (current) use of anticoagulants [Z79.01] [Z79.01]  Pulmonary embolism with infarction (H) [I26.99] [I26.99]         Contact Numbers     [facility]  Please call [phone] to cancel and/or reschedule your appointment   Please call [phone] with any problems or questions regarding your therapy.        September 2017 Details    Sun Mon Tue Wed Thu Fri Sat          1               2                 3               4               5      5 mg   See details      6      5 mg         7      5 mg         8      5 mg         9      5 mg           10      5 mg         11      5 mg         12      2.5 mg         13      5 mg         14            15               16                 17               18               19               20               21               22               23                 24               25               26               27               28               29               30                Date Details   09/05 This INR check       Date of next INR:  9/14/2017         How to take your warfarin dose     To take:  2.5 mg Take 0.5 of a 5 mg tablet.    To take:  5 mg Take 1 of the 5 mg tablets.

## 2017-09-05 NOTE — PROGRESS NOTES
ANTICOAGULATION FOLLOW-UP CLINIC VISIT    Patient Name:  Tana Caceres  Date:  9/5/2017  Contact Type:  Telephone/ Karine [phone]    SUBJECTIVE:     Patient Findings     Positives Missed doses, No Problem Findings    Comments S/O:  Karine from  home care calls with patients INR today of 1.4.  Tana Caceres is on Coumadin for PE.  Current Coumadin dose is 8/31: 5 mg; 9/1: 10 mg; Otherwise 2.5 mg on Tue, Thu, Sat; 5 mg all other days.  Concerns today are: None Noted.    A/P: Tana Caceres's INR is Subtherapeutic  for his/her goal range of 2 - 3.  Reasons why INR is out of range may include:missed dose, fasting  Recommended to have Tana Caceres increase Coumadin dose to 5 mg daily except 9/12/17 take 2.5 mg and to have his/her INR rechecked in 1 week on 9/14.      Marina Mohr RN - BC               OBJECTIVE    INR   Date Value Ref Range Status   09/05/2017 1.4  Final       ASSESSMENT / PLAN  No question data found.  Anticoagulation Summary as of 9/5/2017     INR goal 2.0-3.0   Today's INR 1.4!   Maintenance plan 2.5 mg (5 mg x 0.5) on Tue, Thu, Sat; 5 mg (5 mg x 1) all other days   Full instructions 9/5: 5 mg; 9/7: 5 mg; 9/9: 5 mg; Otherwise 2.5 mg on Tue, Thu, Sat; 5 mg all other days   Weekly total 27.5 mg   Plan last modified Marina Mohr RN (7/20/2017)   Next INR check 9/14/2017   Target end date Indefinite    Indications   DVT (deep venous thrombosis) (H) [I82.409]  Long-term (current) use of anticoagulants [Z79.01] [Z79.01]  Pulmonary embolism with infarction (H) [I26.99] [I26.99]         Anticoagulation Episode Summary     INR check location     Preferred lab     Send INR reminders to [facility]    Comments       Anticoagulation Care Providers     Provider Role Specialty Phone number    Manuel Taylor MD [facility] Internal Medicine [phone]            See the Encounter Report to view Anticoagulation Flowsheet and Dosing Calendar (Go to Encounters tab in chart review, and find the  Anticoagulation Therapy Visit)    Dosage adjustment made based on physician directed care plan.    Marina Mohr RN

## 2017-09-08 ENCOUNTER — TELEPHONE (OUTPATIENT)
Dept: INTERNAL MEDICINE | Facility: CLINIC | Age: 77
End: 2017-09-08

## 2017-09-08 NOTE — TELEPHONE ENCOUNTER
RN called and left message for Karine home care nurse. Let her know Dr.Lubka blanco'd patient referral for  to determine eligibility for county assistance to get a med dispenser.  Let her know to call back with any questions or concerns.    Brandy Laguna RN

## 2017-09-08 NOTE — TELEPHONE ENCOUNTER
Reason for call:  Order   Order or referral being requested:   Reason for request: to determine eligibility for county assistance to get a med dispenser  Date needed: as soon as possible  Has the patient been seen by the PCP for this problem? YES    Additional comments: verbal order is OK      Phone number to reach patient:  Other phone number:  [phone]*    Best Time:8-5    Can we leave a detailed message on this number?  YES

## 2017-09-08 NOTE — TELEPHONE ENCOUNTER
Please approve orders as requested  . Reroute if additional input requested from fernando Taylor MD

## 2017-09-14 ENCOUNTER — ANTICOAGULATION THERAPY VISIT (OUTPATIENT)
Dept: NURSING | Facility: CLINIC | Age: 77
End: 2017-09-14

## 2017-09-14 DIAGNOSIS — Z79.01 LONG-TERM (CURRENT) USE OF ANTICOAGULANTS: ICD-10-CM

## 2017-09-14 DIAGNOSIS — I26.99 PULMONARY EMBOLISM WITH INFARCTION (H): ICD-10-CM

## 2017-09-14 DIAGNOSIS — I82.409 DVT (DEEP VENOUS THROMBOSIS) (H): ICD-10-CM

## 2017-09-14 LAB — INR PPP: 2

## 2017-09-14 NOTE — PROGRESS NOTES
ANTICOAGULATION FOLLOW-UP CLINIC VISIT    Patient Name:  Tana Caceres  Date:  9/14/2017  Contact Type:  Telephone/ Karine [phone]    SUBJECTIVE:     Patient Findings     Positives No Problem Findings    Comments S/O:  Karine from  home care calls with patients INR today of 2.0.  Tana Caceres is on Coumadin for PE.  Current Coumadin dose is 2.5 mg Tue and 5 mg ROW.   Concerns today are: None Noted.    A/P: Tana Caceres's INR is Therapeutic  for his/her goal range of 2 - 3.  Reasons why INR is out of range may include: NA  Recommended to have Tana Caceres remain on the same Coumadin dose and to have his/her INR rechecked in 1 week on 9/21.      Marina Mohr RN - BC               OBJECTIVE    INR   Date Value Ref Range Status   09/14/2017 2.0  Final       ASSESSMENT / PLAN  No question data found.  Anticoagulation Summary as of 9/14/2017     INR goal 2.0-3.0   Today's INR 2.0   Maintenance plan 2.5 mg (5 mg x 0.5) on Tue; 5 mg (5 mg x 1) all other days   Full instructions 2.5 mg on Tue; 5 mg all other days   Weekly total 32.5 mg   Plan last modified Marina Mohr RN (9/14/2017)   Next INR check 9/21/2017   Target end date Indefinite    Indications   DVT (deep venous thrombosis) (H) [I82.409]  Long-term (current) use of anticoagulants [Z79.01] [Z79.01]  Pulmonary embolism with infarction (H) [I26.99] [I26.99]         Anticoagulation Episode Summary     INR check location     Preferred lab     Send INR reminders to [facility]    Comments       Anticoagulation Care Providers     Provider Role Specialty Phone number    Manuel Taylor MD [facility] Internal Medicine [phone]            See the Encounter Report to view Anticoagulation Flowsheet and Dosing Calendar (Go to Encounters tab in chart review, and find the Anticoagulation Therapy Visit)    Dosage adjustment made based on physician directed care plan.    Marina Mohr RN

## 2017-09-14 NOTE — MR AVS SNAPSHOT
Tana Ramosherb   9/14/2017   Anticoagulation Therapy Visit    Description:  76 year old female   Provider:  Manuel Taylor MD   Department:  Fz Nurse           INR as of 9/14/2017     Today's INR 2.0      Anticoagulation Summary as of 9/14/2017     INR goal 2.0-3.0   Today's INR 2.0   Full instructions 2.5 mg on Tue; 5 mg all other days   Next INR check 9/21/2017    Indications   DVT (deep venous thrombosis) (H) [I82.409]  Long-term (current) use of anticoagulants [Z79.01] [Z79.01]  Pulmonary embolism with infarction (H) [I26.99] [I26.99]         Contact Numbers     [facility]  Please call [phone] to cancel and/or reschedule your appointment   Please call [phone] with any problems or questions regarding your therapy.        September 2017 Details    Sun Mon Tue Wed Thu Fri Sat          1               2                 3               4               5               6               7               8               9                 10               11               12               13               14      5 mg   See details      15      5 mg         16      5 mg           17      5 mg         18      5 mg         19      2.5 mg         20      5 mg         21            22               23                 24               25               26               27               28               29               30                Date Details   09/14 This INR check       Date of next INR:  9/21/2017         How to take your warfarin dose     To take:  2.5 mg Take 0.5 of a 5 mg tablet.    To take:  5 mg Take 1 of the 5 mg tablets.

## 2017-09-21 ENCOUNTER — TELEPHONE (OUTPATIENT)
Dept: NURSING | Facility: CLINIC | Age: 77
End: 2017-09-21

## 2017-09-21 NOTE — TELEPHONE ENCOUNTER
Home Care called asking for orders to check INR on 9/22 instead of 9/21/17. Verbal orders given and advised to have patient continue normal maintenance dose of 5 mg of warfarin tonight.  Home Care nurse verbalized understanding and has no further questions or concerns.    Marina Mohr RN - BC

## 2017-09-22 ENCOUNTER — ANTICOAGULATION THERAPY VISIT (OUTPATIENT)
Dept: NURSING | Facility: CLINIC | Age: 77
End: 2017-09-22

## 2017-09-22 DIAGNOSIS — I82.409 DVT (DEEP VENOUS THROMBOSIS) (H): ICD-10-CM

## 2017-09-22 DIAGNOSIS — I26.99 PULMONARY EMBOLISM WITH INFARCTION (H): ICD-10-CM

## 2017-09-22 DIAGNOSIS — Z79.01 LONG-TERM (CURRENT) USE OF ANTICOAGULANTS: ICD-10-CM

## 2017-09-22 LAB — INR PPP: 2.6

## 2017-09-22 NOTE — PROGRESS NOTES
ANTICOAGULATION FOLLOW-UP CLINIC VISIT    Patient Name:  Tana Caceres  Date:  9/22/2017  Contact Type:  Telephone/ Josiah    SUBJECTIVE:     Patient Findings     Positives No Problem Findings    Comments S/O:  Josiah from  home care calls with patients INR today of 2.6.  Tana Caceres is on Coumadin for PE.  Current Coumadin dose is 2.5 mg Tue and 5 mg ROW daily.   Concerns today are: Patient reports that she has been taking 5 mg daily for the last 3 weeks. .    A/P: Tana Caceres's INR is Therapeutic  for his/her goal range of 2 - 3.  Reasons why INR is out of range may include:na  Recommended to have Tana Caceres remain on the same Coumadin dose and to have his/her INR rechecked in 2 weeks on 10/5.      Marina Mohr RN - BC               OBJECTIVE    INR   Date Value Ref Range Status   09/22/2017 2.6  Final       ASSESSMENT / PLAN  No question data found.  Anticoagulation Summary as of 9/22/2017     INR goal 2.0-3.0   Today's INR 2.6   Maintenance plan 5 mg (5 mg x 1) every day   Full instructions 5 mg every day   Weekly total 35 mg   Plan last modified Marina Mohr RN (9/22/2017)   Next INR check 10/5/2017   Target end date Indefinite    Indications   DVT (deep venous thrombosis) (H) [I82.409]  Long-term (current) use of anticoagulants [Z79.01] [Z79.01]  Pulmonary embolism with infarction (H) [I26.99] [I26.99]         Anticoagulation Episode Summary     INR check location     Preferred lab     Send INR reminders to [facility]    Comments       Anticoagulation Care Providers     Provider Role Specialty Phone number    Manuel Taylor MD [facility] Internal Medicine [phone]            See the Encounter Report to view Anticoagulation Flowsheet and Dosing Calendar (Go to Encounters tab in chart review, and find the Anticoagulation Therapy Visit)    Dosage adjustment made based on physician directed care plan.    Marina Mohr RN

## 2017-09-22 NOTE — MR AVS SNAPSHOT
Tana Caceres   9/22/2017   Anticoagulation Therapy Visit    Description:  76 year old female   Provider:  Manuel Taylor MD   Department:  [facility] Nurse           INR as of 9/22/2017     Today's INR 2.6      Anticoagulation Summary as of 9/22/2017     INR goal 2.0-3.0   Today's INR 2.6   Full instructions 5 mg every day   Next INR check 10/5/2017    Indications   DVT (deep venous thrombosis) (H) [I82.409]  Long-term (current) use of anticoagulants [Z79.01] [Z79.01]  Pulmonary embolism with infarction (H) [I26.99] [I26.99]         Contact Numbers     [facility]  Please call [phone] to cancel and/or reschedule your appointment   Please call [phone] with any problems or questions regarding your therapy.        September 2017 Details    Sun Mon Tue Wed Thu Fri Sat          1               2                 3               4               5               6               7               8               9                 10               11               12               13               14               15               16                 17               18               19               20               21               22      5 mg   See details      23      5 mg           24      5 mg         25      5 mg         26      5 mg         27      5 mg         28      5 mg         29      5 mg         30      5 mg          Date Details   09/22 This INR check               How to take your warfarin dose     To take:  5 mg Take 1 of the 5 mg tablets.           October 2017 Details    Sun Mon Tue Wed Thu Fri Sat     1      5 mg         2      5 mg         3      5 mg         4      5 mg         5            6               7                 8               9               10               11               12               13               14                 15               16               17               18               19               20               21                 22               23               24                25               26               27               28                 29               30               31                    Date Details   No additional details    Date of next INR:  10/5/2017         How to take your warfarin dose     To take:  5 mg Take 1 of the 5 mg tablets.

## 2017-10-05 ENCOUNTER — ANTICOAGULATION THERAPY VISIT (OUTPATIENT)
Dept: NURSING | Facility: CLINIC | Age: 77
End: 2017-10-05

## 2017-10-05 DIAGNOSIS — Z79.01 LONG-TERM (CURRENT) USE OF ANTICOAGULANTS: ICD-10-CM

## 2017-10-05 DIAGNOSIS — I27.82 OTHER CHRONIC PULMONARY EMBOLISM WITHOUT ACUTE COR PULMONALE (H): ICD-10-CM

## 2017-10-05 DIAGNOSIS — I82.409 DVT (DEEP VENOUS THROMBOSIS) (H): ICD-10-CM

## 2017-10-05 DIAGNOSIS — I26.99 PULMONARY EMBOLISM WITH INFARCTION (H): ICD-10-CM

## 2017-10-05 DIAGNOSIS — I82.4Y9 DEEP VEIN THROMBOSIS (DVT) OF PROXIMAL LOWER EXTREMITY, UNSPECIFIED CHRONICITY, UNSPECIFIED LATERALITY (H): ICD-10-CM

## 2017-10-05 LAB — INR PPP: 2.4

## 2017-10-05 NOTE — MR AVS SNAPSHOT
Tana Ramoshreb   10/5/2017   Anticoagulation Therapy Visit    Description:  76 year old female   Provider:  Manuel Taylor MD   Department:  [facility] Nurse           INR as of 10/5/2017     Today's INR 2.4      Anticoagulation Summary as of 10/5/2017     INR goal 2.0-3.0   Today's INR 2.4   Full instructions 5 mg every day   Next INR check 10/19/2017    Indications   DVT (deep venous thrombosis) (H) [I82.409]  Long-term (current) use of anticoagulants [Z79.01] [Z79.01]  Pulmonary embolism with infarction (H) [I26.99] [I26.99]         Contact Numbers     [facility]  Please call [phone] to cancel and/or reschedule your appointment   Please call [phone] with any problems or questions regarding your therapy.        October 2017 Details    Sun Mon Tue Wed Thu Fri Sat     1               2               3               4               5      5 mg   See details      6      5 mg         7      5 mg           8      5 mg         9      5 mg         10      5 mg         11      5 mg         12      5 mg         13      5 mg         14      5 mg           15      5 mg         16      5 mg         17      5 mg         18      5 mg         19            20               21                 22               23               24               25               26               27               28                 29               30               31                    Date Details   10/05 This INR check       Date of next INR:  10/19/2017         How to take your warfarin dose     To take:  5 mg Take 1 of the 5 mg tablets.

## 2017-10-05 NOTE — PROGRESS NOTES
ANTICOAGULATION FOLLOW-UP CLINIC VISIT    Patient Name:  Tana Caceres  Date:  10/5/2017  Contact Type:  Telephone/ Neo [phone]    SUBJECTIVE:     Patient Findings     Positives No Problem Findings    Comments S/O:  Neo from  home care calls with patients INR today of 2.4.  Tana Caceres is on Coumadin for DVT.  Current Coumadin dose is 5 mg daily.   Concerns today are: None Noted.    A/P: Tana Caceres's INR is Therapeutic  for his/her goal range of 2 - 3.  Reasons why INR is out of range may include:na  Recommended to have Tana Caceres remain on the same Coumadin dose and to have his/her INR rechecked in 2 weeks on 10/19/17.      Marina Mohr RN - BC               OBJECTIVE    INR   Date Value Ref Range Status   10/05/2017 2.4  Final       ASSESSMENT / PLAN  No question data found.  Anticoagulation Summary as of 10/5/2017     INR goal 2.0-3.0   Today's INR 2.4   Maintenance plan 5 mg (5 mg x 1) every day   Full instructions 5 mg every day   Weekly total 35 mg   No change documented Marina Mohr RN   Plan last modified Marina Mohr RN (9/22/2017)   Next INR check 10/19/2017   Target end date Indefinite    Indications   DVT (deep venous thrombosis) (H) [I82.409]  Long-term (current) use of anticoagulants [Z79.01] [Z79.01]  Pulmonary embolism with infarction (H) [I26.99] [I26.99]         Anticoagulation Episode Summary     INR check location     Preferred lab     Send INR reminders to [facility]    Comments       Anticoagulation Care Providers     Provider Role Specialty Phone number    Manuel Taylor MD [facility] Internal Medicine [phone]            See the Encounter Report to view Anticoagulation Flowsheet and Dosing Calendar (Go to Encounters tab in chart review, and find the Anticoagulation Therapy Visit)    Dosage adjustment made based on physician directed care plan.    Marina Mohr RN

## 2017-10-05 NOTE — TELEPHONE ENCOUNTER
warfarin (COUMADIN) 5 MG tablet    Last Written Prescription Date: 7/6/17  Last Fill Qty: 100, # refills: 0  Last Office Visit with G, P or [facility] prescribing provider: 6/2/17       Date and Result of Last PT/INR:   Lab Results   Component Value Date    INR 2.4 10/05/2017    INR 2.6 09/22/2017

## 2017-10-06 RX ORDER — WARFARIN SODIUM 5 MG/1
5 TABLET ORAL DAILY
Qty: 32 TABLET | Refills: 2 | Status: SHIPPED | OUTPATIENT
Start: 2017-10-06 | End: 2017-12-14

## 2017-10-06 NOTE — TELEPHONE ENCOUNTER
Prescription approved per [facility] Refill Protocol.  Marina Mohr, RN - BC

## 2017-10-16 ENCOUNTER — TELEPHONE (OUTPATIENT)
Dept: INTERNAL MEDICINE | Facility: CLINIC | Age: 77
End: 2017-10-16

## 2017-10-16 NOTE — TELEPHONE ENCOUNTER
Reason for Call: Request for an order or referral:    Order or referral being requested: Skilled nursing once every other week for 9 weeks. 4 PRN visits.    Date needed: as soon as possible    Has the patient been seen by the PCP for this problem? YES    Additional comments: verbal order ok    Phone number Patient can be reached at:  Other phone number:  [phone]    Best Time:  Any tme    Can we leave a detailed message on this number?  YES    Call taken on 10/16/2017 at 2:12 PM by Katelin Sher

## 2017-10-17 NOTE — TELEPHONE ENCOUNTER
Left message on Deepali's voicemail with verbal ok for requested orders per [facility] standing order.  Marianne Lockhart RN

## 2017-10-19 ENCOUNTER — ANTICOAGULATION THERAPY VISIT (OUTPATIENT)
Dept: NURSING | Facility: CLINIC | Age: 77
End: 2017-10-19

## 2017-10-19 DIAGNOSIS — Z79.01 LONG-TERM (CURRENT) USE OF ANTICOAGULANTS: ICD-10-CM

## 2017-10-19 DIAGNOSIS — I26.99 PULMONARY EMBOLISM WITH INFARCTION (H): ICD-10-CM

## 2017-10-19 DIAGNOSIS — I82.409 DVT (DEEP VENOUS THROMBOSIS) (H): ICD-10-CM

## 2017-10-19 LAB — INR PPP: 2.4

## 2017-10-19 NOTE — PROGRESS NOTES
ANTICOAGULATION FOLLOW-UP CLINIC VISIT    Patient Name:  Tana Caceres  Date:  10/19/2017  Contact Type:  Telephone/ Josiah [phone]    SUBJECTIVE:     Patient Findings     Positives No Problem Findings    Comments S/O:  Josiah from  home care calls with patients INR today of 2.4.  Tana Caceres is on Coumadin for PE.  Current Coumadin dose is 5 mg daily.   Concerns today are: None Noted.    A/P: Tana Caceres's INR is Therapeutic  for his/her goal range of 2 - 3.  Reasons why INR is out of range may include:na  Recommended to have Tana Caceres remain on the same Coumadin dose and to have his/her INR rechecked in 2 weeks on 11/02/17.      Marina Mohr RN - BC               OBJECTIVE    INR   Date Value Ref Range Status   10/19/2017 2.4  Final       ASSESSMENT / PLAN  No question data found.  Anticoagulation Summary as of 10/19/2017     INR goal 2.0-3.0   Today's INR 2.4   Maintenance plan 5 mg (5 mg x 1) every day   Full instructions 5 mg every day   Weekly total 35 mg   No change documented Marina Mohr RN   Plan last modified aMrina Mohr RN (9/22/2017)   Next INR check 11/2/2017   Target end date Indefinite    Indications   DVT (deep venous thrombosis) (H) [I82.409]  Long-term (current) use of anticoagulants [Z79.01] [Z79.01]  Pulmonary embolism with infarction (H) [I26.99] [I26.99]         Anticoagulation Episode Summary     INR check location     Preferred lab     Send INR reminders to [facility]    Comments       Anticoagulation Care Providers     Provider Role Specialty Phone number    Manuel Taylor MD [facility] Internal Medicine [phone]            See the Encounter Report to view Anticoagulation Flowsheet and Dosing Calendar (Go to Encounters tab in chart review, and find the Anticoagulation Therapy Visit)    Dosage adjustment made based on physician directed care plan.    Marina Mohr RN

## 2017-10-19 NOTE — MR AVS SNAPSHOT
Tana Caceres   10/19/2017   Anticoagulation Therapy Visit    Description:  76 year old female   Provider:  Manuel Taylor MD   Department:  [facility] Nurse           INR as of 10/19/2017     Today's INR 2.4      Anticoagulation Summary as of 10/19/2017     INR goal 2.0-3.0   Today's INR 2.4   Full instructions 5 mg every day   Next INR check 11/2/2017    Indications   DVT (deep venous thrombosis) (H) [I82.409]  Long-term (current) use of anticoagulants [Z79.01] [Z79.01]  Pulmonary embolism with infarction (H) [I26.99] [I26.99]         Contact Numbers     [facility]  Please call [phone] to cancel and/or reschedule your appointment   Please call [phone] with any problems or questions regarding your therapy.        October 2017 Details    Sun Mon Tue Wed Thu Fri Sat     1               2               3               4               5               6               7                 8               9               10               11               12               13               14                 15               16               17               18               19      5 mg   See details      20      5 mg         21      5 mg           22      5 mg         23      5 mg         24      5 mg         25      5 mg         26      5 mg         27      5 mg         28      5 mg           29      5 mg         30      5 mg         31      5 mg              Date Details   10/19 This INR check               How to take your warfarin dose     To take:  5 mg Take 1 of the 5 mg tablets.           November 2017 Details    Sun Mon Tue Wed Thu Fri Sat        1      5 mg         2            3               4                 5               6               7               8               9               10               11                 12               13               14               15               16               17               18                 19               20               21               22                23               24               25                 26               27               28               29               30                  Date Details   No additional details    Date of next INR:  11/2/2017         How to take your warfarin dose     To take:  5 mg Take 1 of the 5 mg tablets.

## 2017-10-24 ENCOUNTER — OFFICE VISIT (OUTPATIENT)
Dept: NEUROPSYCHOLOGY | Facility: CLINIC | Age: 77
End: 2017-10-24

## 2017-10-24 DIAGNOSIS — F02.80 LATE ONSET ALZHEIMER'S DISEASE WITHOUT BEHAVIORAL DISTURBANCE (H): ICD-10-CM

## 2017-10-24 DIAGNOSIS — F01.50 VASCULAR DEMENTIA WITHOUT BEHAVIORAL DISTURBANCE (H): Primary | ICD-10-CM

## 2017-10-24 DIAGNOSIS — G30.1 LATE ONSET ALZHEIMER'S DISEASE WITHOUT BEHAVIORAL DISTURBANCE (H): ICD-10-CM

## 2017-10-24 NOTE — MR AVS SNAPSHOT
After Visit Summary   10/24/2017    Tana Caceres    MRN: [id number]           Patient Information     Date Of Birth          1940        Visit Information        Provider Department      10/24/2017 8:00 AM Phill Nobles, PhD  Health Neuropsychology        Today's Diagnoses     Vascular dementia without behavioral disturbance    -  1    Late onset Alzheimer's disease without behavioral disturbance           Follow-ups after your visit        Who to contact     Please call your clinic at [phone] to:    Ask questions about your health    Make or cancel appointments    Discuss your medicines    Learn about your test results    Speak to your doctor   If you have compliments or concerns about an experience at your clinic, or if you wish to file a complaint, please contact [facility] Patient Relations at [phone] or email us at [email or web address]         Additional Information About Your Visit        MyChart Information     [employer] gives you secure access to your electronic health record. If you see a primary care provider, you can also send messages to your care team and make appointments. If you have questions, please call your primary care clinic.  If you do not have a primary care provider, please call [phone] and they will assist you.      ibeatyou is an electronic gateway that provides easy, online access to your medical records. With ibeatyou, you can request a clinic appointment, read your test results, renew a prescription or communicate with your care team.     To access your existing account, please contact your [facility] Clinic or call [phone] for assistance.        Care EveryWhere ID     This is your Care EveryWhere ID. This could be used by other organizations to access your [facility] medical records  [id number]         Blood Pressure from Last 3 Encounters:   08/11/17 122/78   06/02/17 137/74    06/01/17 (!) 157/93    Weight from Last 3 Encounters:   08/11/17 69.9 kg (154 lb 3.2 oz)   06/02/17 75.3 kg (166 lb)   12/22/16 79.4 kg (175 lb)              We Performed the Following     [id number] TESTING, PER HR/PSYCHOLOGIST        Primary Care Provider Office Phone # Fax #    Manuel Taylor [phone] [address] [facility] [address]        Equal Access to Services     [facility]: Hadii aad ku hadasho Soomaali, waaxda luqadaha, qaybta kaalmada adeegyada, waxay jessiein hayaajunior raymond . So [facility] [phone].    ATENCIÓN: Si habla español, tiene a akhtar disposición servicios gratuitos de asistencia lingüística. [facility] [phone].    We comply with applicable federal civil rights laws and [place] laws. We do not discriminate on the basis of race, color, national origin, age, disability, sex, sexual orientation, or gender identity.            Thank you!     Thank you for choosing [facility] NEUROPSYCHOLOGY  for your care. Our goal is always to provide you with excellent care. Hearing back from our patients is one way we can continue to improve our services. Please take a few minutes to complete the written survey that you may receive in the mail after your visit with us. Thank you!             Your Updated Medication List - Protect others around you: Learn how to safely use, store and throw away your medicines at www.disposemymeds.org.          This list is accurate as of: 10/24/17 11:59 PM.  Always use your most recent med list.                   Brand Name Dispense Instructions for use Diagnosis    * ACETAMINOPHEN PO      Take 500 mg by mouth every 6 hours as needed for pain        * acetaminophen 325 MG tablet    TYLENOL    100 tablet    Take 2 tablets (650 mg) by mouth every 6 hours as needed for mild pain    DDD (degenerative disc disease), cervical       amLODIPine 5 MG tablet    NORVASC     Take 5 mg by mouth        aspirin 81 MG tablet     30 tablet     Take 1 tablet (81 mg) by mouth daily        atorvastatin 20 MG tablet    LIPITOR     Take 20 mg by mouth        cholecalciferol 1000 UNIT tablet    vitamin D3    90 tablet    Take 1 tablet (1,000 Units) by mouth daily    Hypovitaminosis D       ENALAPRIL MALEATE PO      Take 20 mg by mouth        LEVOTHYROXINE SODIUM PO      Take 75 mcg by mouth daily        order for DME     1 Device    Equipment being ordered: wall bars with screws, no suction        oxybutynin 10 MG 24 hr tablet    DITROPAN-XL    60 tablet    TAKE TWO TABLETS BY MOUTH DAILY.  IF AFTER 1-2 WEEKS YOUR RESULTS ARE UNSATISFACTORY, YOU MAY INCREASE TO 3 TABLETS DAILY    OAB (overactive bladder)       piroxicam 20 MG capsule    FELDENE    30 capsule    Take 1 capsule (20 mg) by mouth daily (with breakfast)    Primary osteoarthritis of both knees       polyethylene glycol powder    MIRALAX    510 g    Take 17 g (1 capful) by mouth daily    Chronic idiopathic constipation       warfarin 5 MG tablet    COUMADIN    32 tablet    Take 1 tablet (5 mg) by mouth daily    Deep vein thrombosis (DVT) of proximal lower extremity, unspecified chronicity, unspecified laterality (H), Other chronic pulmonary embolism without acute cor pulmonale (H)       * Notice:  This list has 2 medication(s) that are the same as other medications prescribed for you. Read the directions carefully, and ask your doctor or other care provider to review them with you.

## 2017-10-30 NOTE — PROGRESS NOTES
NAME: Tana Caceres  MRN: [id number]  : 1940  DO: 10/24/2017    Neuropsychology Laboratory  [facility]  [address]  [place]  [address] [phone]    NEUROPSYCHOLOGICAL EVALUATION    RELEVANT HISTORY AND REASON FOR REFERRAL    This is a report of neuropsychological consultation regarding Tana Caceres. Ms. Caceres is a 76-year-old woman with the equivalent of 16 years of formal education. She presents with concerns about progressive cognitive decline for the last few years, and she had a pontine stroke in late . She is referred for neuropsychological assessment of brain function by her primary care provider, Dr. Manuel Taylor.     Ms. Caceres is Mongolian and speaks minimal English. The evaluation was conducted with the aid of a professional , translating between Divehi and English.     With me, Ms. Caceres stated that she has no concerns about cognitive functioning. She denied any history of stroke, even after reminders. She knew that she had a DVT about a decade ago. She denied any sleep or mood issues. Her son, Sotero, sees things significantly differently.     Sotero described problems with not being able to tell the time of day, not being able to track medications, and not being able to manage bills. She seems to have trouble recognizing when cognitive problems arise. When she does notice a problem, she tends to give up easily. She seems a little lonely but not necessarily depressed. She seems to get up earlier than normal, typically around 5:00 a.m. These issues have been more noticeable in the last year or so.     Through Care Everywhere, I see concerns about cognition since at least 2014, with documentation of cognitive screening with the Divehi version of the MoCA. Her score of 25/30 indicated mild impairment. In early , she raised concerns about changes in her sense of taste. A note from [facility] [place]. Steven Sanchez describes workup for   dysgeusia in the setting of cognitive impairment.  Head CT in February 2015 was interpreted as unremarkable.     The stroke in November 2016 had presenting symptoms of left side weakness (upper and lower extremity), left facial droop, and arm drift. Brain MRI showed 6 mm acute infarct in the right paramedian aspect of the luis antonio. There was also evidence for generalized atrophy and moderately severe chronic small vessel vascular changes. MRA showed  very mild  stenosis of the mid-basal artery and 25 % stenosis of the proximal left internal carotid artery. Records indicate that the event may been related to running out of antihypertensive medications for several days.     Ms. Caceres lived in [place] until about 3 years ago. She reported earning a university degree in law. 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 She retired at age 58. She was  once () and had two sons.     She lives with Sotero and his family. She does not drive. She does not leave the house unaccompanied. Sotero manages all of her medications and appointments. He notes that he took over medication management after seeing that she would forget whether or not she had taken her pills earlier in the morning.     There is no history of seizure, TBI, migraine, motoric dyscontrol, episodes of altered mental status or notable cognitive fluctuations, hallucinations, or indications of REM sleep behaviors. Contrary to medical records, Ms. Caceres denied any changes to her sense of taste or smell. Of course, her self-report in this regard may be unreliable. There is no history of substance abuse or tobacco dependence.     According to records, her medical history also includes persistent insomnia, hypertension, factor V Leiden on long-term warfarin treatment, history of pulmonary embolism, history of deep vein thrombosis, hypothyroidism s/p partial thyroidectomy, hypercholesterolemia,  hyperglobulinemia, s/p unilateral nephrectomy, dyspnea on exertion, vitamin D deficiency, osteoporosis, degenerative joint disease of hip, pseudophakia, and sensorineural hearing loss of the left ear s/p tympanoplasty. Her current medication list includes warfarin, oxybutynin, amlodipine, atorvastatin, 81 mg aspirin, piroxicam, levothyroxine, enalapril, acetaminophen, and cholecalciferol.    BEHAVIORAL OBSERVATIONS    Ms. Caceres was polite and cooperative with the exam. Mood was neutral, with mood-congruent affect. Interpersonal interactions were normal. She was alert and not somnolent. Immediate attentional control was appropriate. In the context of a non-English speaking patient, I cannot comment on fluency, articulation, prosody, and word finding. Comprehension was limited, seemingly beyond issues stemming from translations. She was somewhat impulsive or careless in her responses. Her reactions to success and difficulty were appropriate. Her effort and engagement were good. The test results are taken as reasonable estimations of her cognitive status.     MEASURES ADMINISTERED    In addition to conducting a clinical interview, I directly administered the following measures with the aid of a professional :     Orientation Questionnaire: Place, Basic Personal Information; Animal Naming Test; [employer] Cognitive Assessment (Korean version); ASHLY-7 (Korean version), PHQ-9 (Korean version).     RESULTS AND INTERPRETATION    Orientation: Ms. Caceres was poorly oriented to time. She knew the current month and day of the week. She was two days off on the date and stated that the current year was 1920.     She was partially oriented to place. She knew she was in a hospital setting in [place], but she did not know the name of the city we were in.     She was partially oriented to basic personal information. She knew her birthdate but not her age. She did not know her home address or city she lives in. Of note,  Sotero stated that in the 3 years she has lived with him, she has never asked what city they live in and he has asked her to learn their address. She knew the names of her 2 sons but not their exact ages (3-8 years off). She knew the names and ages of the 4 grandchildren that she lives with.     Language & Related Skills: Abstract analogical thinking was mildly below normal expectations. For confrontation naming, Ms. Caceres was able to name 2/3 animals, mistaking the rhinoceros as a unicorn. She was able to correctly repeat 1 of the 2 sentences from the MoCA. Letter-based verbal fluency was below normal expectations for the MoCA s scoring rules but within normal expectations based on published Chinese-speaker normative data for verbal fluency tasks. Category-based verbal fluency (animals) was performed in the borderline impaired range against normative data for Chinese speakers.     Perceptual & Visuoconstructional Skills: Her copy of a Necker cube was mildly abnormal. Clock drawing was abnormal in visuospatial aspects and the representation of time. She attempted two separate clocks, neither of which was normal.     Attentional & Executive Control: Auditory vigilance was normal on the MoCA s tap-to-letter task. She was able to repeat brief digit strings forward and backward. Working memory for mental math was entirely defective for serial 7s (no correct responses). Ms. Caceres was unable to complete the MoCA s trail making test, indicating impaired control over divided attention.     Learning & Anterograde Memory: Immediate memory for a short list of words was impaired (1/5 on first trial, 3/5 on second trial). After a few minutes, she recalled 1 of the 5 words. As noted above, she demonstrated a practical memory impairment by having no recollection of the stroke she suffered last fall.     Emotional Functioning: On brief self-report questionnaires, Ms. Caceres denied any symptoms of depression (PHQ-9=0) or anxiety  (ASHLY-7=0).     IMPRESSIONS    The evaluation results are abnormal. Her total score on the MoCA (Syriac version) is 13/30, a significant decline from 25/30 in 2014. There are cognitive deficits in multiple domains, including learning and memory (despite good immediate attention), executive functioning, language accuracy, temporal orientation, visuoconstructional abilities, and mathematics. Anosognosia is apparent. These issues cannot be explained by normal aging or cultural-linguistic limitations on psychometric assessment. Her self-report of entirely absent mood and anxiety symptoms probably represents slight underreporting, but I do not have concern for fundamental problems in these areas.     Ms. Caceres has a stroke history, many risk factors for cerebrovascular dysfunction, and signs of diffuse cerebrovascular disease on neuroimaging. This probably accounts for a significant portion of the cognitive abnormality. However, I suspect a mixed etiology with a neurodegenerative factor. Alzheimer s disease would be the most likely agent.     At this time, a diagnosis of dementia is appropriate. The staging seems to be in the mild range but clearly progressing.     RECOMMENDATIONS    Neurologic care and monitoring are indicated. Evaluation by a neurologist may be beneficial. I defer to Dr. Taylor and other physicians regarding treatment options, but cholinergic therapy could be considered. Otherwise, I encourage a lifestyle of regular physical, mental, and social activity to support long-term brain health.     I am glad that Ms. Caceres has close family support and care. She appears to be in an appropriate living situation.     An up-to-date cognitive baseline has been obtained. I would be happy to see Ms. Caceres again when clinically indicated.     Phill Nobles, PhD,   Clinical Neuropsychologist      Time spent:  Two hours professional time, including interview, testing, records review, data integration, and report  writing (CPT 99192). ICD-10 diagnosis: F01.50, G30.1

## 2017-11-01 NOTE — PROGRESS NOTES
SUBJECTIVE:   Tana Caceres is a 76 year old female who presents to clinic today for the following health issues:    Cognitive Function-- Patient had a neuropsychiatric testing done. Her son notes that she fell in the kitchen about 6 months ago. Patient has been having memory impairment. This is a progressive issues and patient completed neuropsychiatric tests recently and these tests are available within the Epic electronic medical records and we did review these tests extensively. The root diagnosis is presumed to be Alzhemiers dementia . See assessment and plan section      Additional Information-- The patient's son states that the patient is not very good at speaking, understanding or following conversations that are had in english. Patient's son reports that the patient complains of having a bitter mouth.     Problem list and histories reviewed & adjusted, as indicated.  Additional history: as documented    Patient Active Problem List   Diagnosis     Hypertension goal BP (blood pressure) < 140/90     DVT (deep venous thrombosis) (H)     Acquired hypothyroidism     Factor V Leiden (H)     Unilateral sensorineural hearing loss     Mixed hearing loss     Osteoporosis     History of nephrectomy     Adrenal mass, left (H)     Right pontine CVA (H)     DO (dyspnea on exertion)     Primary osteoarthritis of knee     Morbid obesity (H)     Physical deconditioning     Dysgeusia     Cataract     LAP-BAND surgery status     Left adrenal mass (H)     Age-related osteoporosis without current pathological fracture     Hyperlipidemia LDL goal <100     Long term current use of anticoagulant therapy     Hyperglobulinemia     History of partial thyroidectomy     Microscopic hematuria     Persistent insomnia     MCI (mild cognitive impairment)     Other chronic pulmonary embolism without acute cor pulmonale (H)     Long-term (current) use of anticoagulants [Z79.01]     Pulmonary embolism with infarction (H) [I26.99]     Past  Surgical History:   Procedure Laterality Date     C PARTIAL EXCISION THYROID,BILAT       CATARACT IOL, RT/LT  8/14/2014    Procedure: EXTRACTION, CATARACT, WITH INTRAOCULAR LENS INSERTION, POSTERIOR CHAMBER; Laterality: Right; Surgeon: Eufemia Chatman MD; Service: Ophthalmology     CATARACT IOL, RT/LT  11/10/2014    Procedure: EXTRACTION, CATARACT, WITH INTRAOCULAR LENS INSERTION, POSTERIOR CHAMBER; Laterality: Left; Surgeon: Gulshan Pickering MD; Service: Ophthalmology     NEPHRECTOMY  1980s     SURGICAL HISTORY OF -       R ear surgery     TYMPANOPLASTY  10/14/2014    Procedure: TYMPANOPLASTY; Laterality: Right; Surgeon: Stephen Abarca MD; Service: Otolaryngology     XR LAP BAND      uncertain       Social History   Substance Use Topics     Smoking status: Never Smoker     Smokeless tobacco: Never Used     Alcohol use No     Family History   Problem Relation Age of Onset     Hypertension Father      Glaucoma Father      Hypertension Mother      Hypertension Brother      Glaucoma Brother      Ovarian Cancer No family hx of      Cancer - colorectal No family hx of      Breast Cancer No family hx of          Current Outpatient Prescriptions   Medication Sig Dispense Refill     donepezil (ARICEPT) 5 MG tablet Take 1 tablet (5 mg) by mouth At Bedtime 30 tablet 1     oxybutynin (DITROPAN-XL) 10 MG 24 hr tablet TAKE TWO TABLETS BY MOUTH DAILY.  IF AFTER 1-2 WEEKS YOUR RESULTS ARE UNSATISFACTORY, YOU MAY INCREASE TO 3 TABLETS DAILY 60 tablet 9     warfarin (COUMADIN) 5 MG tablet Take 1 tablet (5 mg) by mouth daily 32 tablet 2     cholecalciferol (VITAMIN D) 1000 UNIT tablet Take 1 tablet (1,000 Units) by mouth daily 90 tablet 3     amLODIPine (NORVASC) 5 MG tablet Take 5 mg by mouth       atorvastatin (LIPITOR) 20 MG tablet Take 20 mg by mouth       aspirin 81 MG tablet Take 1 tablet (81 mg) by mouth daily 30 tablet 11     order for DME Equipment being ordered: wall bars with screws, no suction 1 Device 0      polyethylene glycol (MIRALAX) powder Take 17 g (1 capful) by mouth daily 510 g 1     piroxicam (FELDENE) 20 MG capsule Take 1 capsule (20 mg) by mouth daily (with breakfast) 30 capsule 1     LEVOTHYROXINE SODIUM PO Take 75 mcg by mouth daily       ENALAPRIL MALEATE PO Take 20 mg by mouth       ACETAMINOPHEN PO Take 500 mg by mouth every 6 hours as needed for pain       acetaminophen (TYLENOL) 325 MG tablet Take 2 tablets (650 mg) by mouth every 6 hours as needed for mild pain 100 tablet 1     Labs reviewed in EPIC    Reviewed and updated as needed this visit by clinical staff  Tobacco  Allergies  Meds  Med Hx  Surg Hx  Fam Hx  Soc Hx      Reviewed and updated as needed this visit by Provider       ROS:  Constitutional, HEENT, cardiovascular, pulmonary, GI, , musculoskeletal, neuro, skin, endocrine and psych systems are negative, except as otherwise noted.    This document serves as a record of the services and decisions personally performed and made by Manuel Taylor MD. It was created on their behalf by Sobia Eddy, a trained medical scribe. The creation of this document is based the provider's statements to the medical scribe.  Sobia Eddy  November 3, 2017 2:23 PM    OBJECTIVE:   /66  Pulse 77  Temp 98  F (36.7  C) (Oral)  Wt 68.5 kg (151 lb)  SpO2 97%  BMI 34.47 kg/m2  Body mass index is 34.47 kg/(m^2).  GENERAL: healthy, alert and no distress, elderly woman with wearing a traditional hijab . Limited English language skills and a paucity of words is noted , essentially all communication is with son Sotero.  EYES: Eyes grossly normal to inspection, EOMI, conjunctivae and sclerae normal  SKIN: no suspicious lesions or rashes to visible skin   NEURO: Mentation intact and speech normal  PSYCH: mentation appears normal, affect normal/bright    Diagnostic Test Results:  Results for orders placed or performed in visit on 11/02/17   INR   Result Value Ref Range    INR 2.1        ASSESSMENT/PLAN:    (G30.9,  F02.80) Alzheimer's dementia without behavioral disturbance, unspecified timing of dementia onset  (primary encounter diagnosis)  Comment: we reviewed this diagnosis, it's progressive nature and overall prognosis. Treatment options including neurological consultation and different Alzhemiers dementia medications like aricept or namenda reviewed . Side effects of donepezil (ARICEPT) discussed . We agree to start 5 milligrams of donepezil (ARICEPT) and will see patient back in 1-2 months . Pre-clinic laboratory studies will be arranged with a one week follow up appointment .   Plan: donepezil (ARICEPT) 5 MG tablet, Comprehensive         metabolic panel. We might increase dose to 10 milligrams at January appointment             (Z91.81) At risk for falling  Comment:   Plan:     (Z23) Need for prophylactic vaccination and inoculation against influenza  Comment: administered   Plan: FLU VACCINE, INCREASED ANTIGEN, PRESV FREE, AGE        65+ [64973], Vaccine Administration, Initial         [77025]            (Z23) Need for prophylactic vaccination with tetanus-diphtheria (TD)  Comment: to be addressed at follow up appointment   Plan: as above     Patient Instructions     [facility]    If you have any questions regarding to your visit please contact your care team:     Team Pink:   Clinic Hours Telephone Number   Internal Medicine:  Dr. Pat Styles, NP       7am-7pm  Monday - Thursday   7am-5pm  Fridays  [phone]  (Appointment scheduling available 24/7)    Questions about your visit?  Team Line  [phone]   Urgent Care - [place] [facility] [place] - 11am-9pm Monday-Friday Saturday-Sunday- 9am-5pm   [place] [place] - 5pm-9pm Monday-Friday Saturday-Sunday- 9am-5pm  [phone] - Leila CARABALLO  [phone] - [place]       What options do I have for visits at the clinic other than the traditional office visit?  To expand how we care for you, many of  our providers are utilizing electronic visits (e-visits) and telephone visits, when medically appropriate, for interactions with their patients rather than a visit in the clinic.   We also offer nurse visits for many medical concerns. Just like any other service, we will bill your insurance company for this type of visit based on time spent on the phone with your provider. Not all insurance companies cover these visits. Please check with your medical insurance if this type of visit is covered. You will be responsible for any charges that are not paid by your insurance.      E-visits via [employer]:  generally incur a $35.00 fee.  Telephone visits:  Time spent on the phone: *charged based on time that is spent on the phone in increments of 10 minutes. Estimated cost:   5-10 mins $30.00   11-20 mins. $59.00   21-30 mins. $85.00   Use [employer] (secure email communication and access to your chart) to send your primary care provider a message or make an appointment. Ask someone on your Team how to sign up for [employer].    For a Price Quote for your services, please call our Consumer Price Line at [phone].    As always, Thank you for trusting us with your health care needs!      Sagrario Og CMA        The information in this document, created by the medical scribe for me, accurately reflects the services I personally performed and the decisions made by me. I have reviewed and approved this document for accuracy.   MD Manuel Valles MD  [facility]

## 2017-11-02 ENCOUNTER — ANTICOAGULATION THERAPY VISIT (OUTPATIENT)
Dept: NURSING | Facility: CLINIC | Age: 77
End: 2017-11-02

## 2017-11-02 DIAGNOSIS — I82.409 DVT (DEEP VENOUS THROMBOSIS) (H): ICD-10-CM

## 2017-11-02 DIAGNOSIS — Z79.01 LONG-TERM (CURRENT) USE OF ANTICOAGULANTS: ICD-10-CM

## 2017-11-02 DIAGNOSIS — I26.99 PULMONARY EMBOLISM WITH INFARCTION (H): ICD-10-CM

## 2017-11-02 LAB — INR PPP: 2.1

## 2017-11-02 NOTE — MR AVS SNAPSHOT
Tana Ramosherb   11/2/2017   Anticoagulation Therapy Visit    Description:  76 year old female   Provider:  Manuel Taylor MD   Department:  [facility]           INR as of 11/2/2017     Today's INR 2.1      Anticoagulation Summary as of 11/2/2017     INR goal 2.0-3.0   Today's INR 2.1   Full instructions 5 mg every day   Next INR check 11/16/2017    Indications   DVT (deep venous thrombosis) (H) [I82.409]  Long-term (current) use of anticoagulants [Z79.01] [Z79.01]  Pulmonary embolism with infarction (H) [I26.99] [I26.99]         Contact Numbers     [facility]  Please call [phone] to cancel and/or reschedule your appointment   Please call [phone] with any problems or questions regarding your therapy.        November 2017 Details    Sun Mon Tue Wed Thu Fri Sat        1               2      5 mg   See details      3      5 mg         4      5 mg           5      5 mg         6      5 mg         7      5 mg         8      5 mg         9      5 mg         10      5 mg         11      5 mg           12      5 mg         13      5 mg         14      5 mg         15      5 mg         16            17               18                 19               20               21               22               23               24               25                 26               27               28               29               30                  Date Details   11/02 This INR check       Date of next INR:  11/16/2017         How to take your warfarin dose     To take:  5 mg Take 1 of the 5 mg tablets.

## 2017-11-02 NOTE — PROGRESS NOTES
ANTICOAGULATION FOLLOW-UP CLINIC VISIT    Patient Name:  Tana Caceres  Date:  11/2/2017  Contact Type:  Telephone/ Rupal    SUBJECTIVE:     Patient Findings     Positives No Problem Findings    Comments S/O:  Rupal from  home care calls with patients INR today of 2.1.  Tana Caceres is on Coumadin for PE.  Current Coumadin dose is 5 mg daily.   Concerns today are: None Noted.    A/P: Tana Caceres's INR is Therapeutic  for his/her goal range of 2 - 3.  Reasons why INR is out of range may include: na  Recommended to have Tana Caceres remain on the same Coumadin dose and to have his/her INR rechecked in 2 weeks on 11/16.      Marina Mohr RN - BC               OBJECTIVE    INR   Date Value Ref Range Status   11/02/2017 2.1  Final       ASSESSMENT / PLAN  No question data found.  Anticoagulation Summary as of 11/2/2017     INR goal 2.0-3.0   Today's INR 2.1   Maintenance plan 5 mg (5 mg x 1) every day   Full instructions 5 mg every day   Weekly total 35 mg   Plan last modified Marina Mohr RN (9/22/2017)   Next INR check 11/16/2017   Target end date Indefinite    Indications   DVT (deep venous thrombosis) (H) [I82.409]  Long-term (current) use of anticoagulants [Z79.01] [Z79.01]  Pulmonary embolism with infarction (H) [I26.99] [I26.99]         Anticoagulation Episode Summary     INR check location     Preferred lab     Send INR reminders to [facility]    Comments       Anticoagulation Care Providers     Provider Role Specialty Phone number    Manuel Taylor MD [facility] Internal Medicine [phone]            See the Encounter Report to view Anticoagulation Flowsheet and Dosing Calendar (Go to Encounters tab in chart review, and find the Anticoagulation Therapy Visit)    Dosage adjustment made based on physician directed care plan.    Marina Mohr RN

## 2017-11-03 ENCOUNTER — OFFICE VISIT (OUTPATIENT)
Dept: INTERNAL MEDICINE | Facility: CLINIC | Age: 77
End: 2017-11-03
Payer: COMMERCIAL

## 2017-11-03 VITALS
OXYGEN SATURATION: 97 % | BODY MASS INDEX: 34.47 KG/M2 | WEIGHT: 151 LBS | HEART RATE: 77 BPM | DIASTOLIC BLOOD PRESSURE: 66 MMHG | SYSTOLIC BLOOD PRESSURE: 130 MMHG | TEMPERATURE: 98 F

## 2017-11-03 DIAGNOSIS — Z23 NEED FOR PROPHYLACTIC VACCINATION WITH TETANUS-DIPHTHERIA (TD): ICD-10-CM

## 2017-11-03 DIAGNOSIS — E78.5 HYPERLIPIDEMIA LDL GOAL <100: ICD-10-CM

## 2017-11-03 DIAGNOSIS — Z91.81 AT RISK FOR FALLING: ICD-10-CM

## 2017-11-03 DIAGNOSIS — E03.9 ACQUIRED HYPOTHYROIDISM: ICD-10-CM

## 2017-11-03 DIAGNOSIS — G30.9 ALZHEIMER'S DEMENTIA WITHOUT BEHAVIORAL DISTURBANCE, UNSPECIFIED TIMING OF DEMENTIA ONSET: Primary | ICD-10-CM

## 2017-11-03 DIAGNOSIS — Z23 NEED FOR PROPHYLACTIC VACCINATION AND INOCULATION AGAINST INFLUENZA: ICD-10-CM

## 2017-11-03 DIAGNOSIS — I10 HYPERTENSION GOAL BP (BLOOD PRESSURE) < 140/90: ICD-10-CM

## 2017-11-03 DIAGNOSIS — Z90.5 HISTORY OF NEPHRECTOMY: ICD-10-CM

## 2017-11-03 DIAGNOSIS — F02.80 ALZHEIMER'S DEMENTIA WITHOUT BEHAVIORAL DISTURBANCE, UNSPECIFIED TIMING OF DEMENTIA ONSET: Primary | ICD-10-CM

## 2017-11-03 PROCEDURE — 90662 IIV NO PRSV INCREASED AG IM: CPT | Performed by: INTERNAL MEDICINE

## 2017-11-03 PROCEDURE — 90471 IMMUNIZATION ADMIN: CPT | Performed by: INTERNAL MEDICINE

## 2017-11-03 PROCEDURE — 99214 OFFICE O/P EST MOD 30 MIN: CPT | Mod: 25 | Performed by: INTERNAL MEDICINE

## 2017-11-03 RX ORDER — DONEPEZIL HYDROCHLORIDE 5 MG/1
5 TABLET, FILM COATED ORAL AT BEDTIME
Qty: 30 TABLET | Refills: 1 | Status: SHIPPED | OUTPATIENT
Start: 2017-11-03 | End: 2017-12-14

## 2017-11-03 ASSESSMENT — PAIN SCALES - GENERAL: PAINLEVEL: NO PAIN (0)

## 2017-11-03 NOTE — PATIENT INSTRUCTIONS
[facility]    If you have any questions regarding to your visit please contact your care team:     Team Pink:   Clinic Hours Telephone Number   Internal Medicine:  Dr. Pat Styles NP       7am-7pm  Monday - Thursday   7am-5pm  Fridays  [phone]  (Appointment scheduling available 24/7)    Questions about your visit?  Team Line  [phone]   Urgent Care - [place] and [facility] - 11am-9pm Monday-Friday Saturday-Sunday- 9am-5pm   [place] - 5pm-9pm Monday-Friday Saturday-Sunday- 9am-5pm  [phone] - [place]   [phone] - [place]       What options do I have for visits at the clinic other than the traditional office visit?  To expand how we care for you, many of our providers are utilizing electronic visits (e-visits) and telephone visits, when medically appropriate, for interactions with their patients rather than a visit in the clinic.   We also offer nurse visits for many medical concerns. Just like any other service, we will bill your insurance company for this type of visit based on time spent on the phone with your provider. Not all insurance companies cover these visits. Please check with your medical insurance if this type of visit is covered. You will be responsible for any charges that are not paid by your insurance.      E-visits via [employer]:  generally incur a $35.00 fee.  Telephone visits:  Time spent on the phone: *charged based on time that is spent on the phone in increments of 10 minutes. Estimated cost:   5-10 mins $30.00   11-20 mins. $59.00   21-30 mins. $85.00   Use [employer] (secure email communication and access to your chart) to send your primary care provider a message or make an appointment. Ask someone on your Team how to sign up for [employer].    For a Price Quote for your services, please call our Consumer Price Line at [phone].    As always, Thank you for trusting us with your health care  needs!      Sagrario Og, CMA

## 2017-11-03 NOTE — MR AVS SNAPSHOT
After Visit Summary   11/3/2017    Tana Caceres    MRN: [id number]           Patient Information     Date Of Birth          1940        Visit Information        Provider Department      11/3/2017 2:00 PM Manuel Taylor MD; [employer] [facility]        Today's Diagnoses     Alzheimer's dementia without behavioral disturbance, unspecified timing of dementia onset    -  1    At risk for falling        Need for prophylactic vaccination and inoculation against influenza        Need for prophylactic vaccination with tetanus-diphtheria (TD)          Care Instructions    [facility]    If you have any questions regarding to your visit please contact your care team:     Team Pink:   Clinic Hours Telephone Number   Internal Medicine:  Dr. Pat Styles, NP       7am-7pm  Monday - Thursday   7am-5pm  Fridays  [phone]  (Appointment scheduling available 24/7)    Questions about your visit?  Team Line  [phone]   Urgent Care - [place] and [place] - 11am-9pm Monday-Friday Saturday-Sunday- 9am-5pm   [place] - 5pm-9pm Monday-Friday Saturday-Sunday- 9am-5pm  [phone] - [place]   [phone] - [place]       What options do I have for visits at the clinic other than the traditional office visit?  To expand how we care for you, many of our providers are utilizing electronic visits (e-visits) and telephone visits, when medically appropriate, for interactions with their patients rather than a visit in the clinic.   We also offer nurse visits for many medical concerns. Just like any other service, we will bill your insurance company for this type of visit based on time spent on the phone with your provider. Not all insurance companies cover these visits. Please check with your medical insurance if this type of visit is covered. You will be responsible for any charges that are not paid by your  insurance.      E-visits via [employer]:  generally incur a $35.00 fee.  Telephone visits:  Time spent on the phone: *charged based on time that is spent on the phone in increments of 10 minutes. Estimated cost:   5-10 mins $30.00   11-20 mins. $59.00   21-30 mins. $85.00   Use [employer] (secure email communication and access to your chart) to send your primary care provider a message or make an appointment. Ask someone on your Team how to sign up for [employer].    For a Price Quote for your services, please call our [employer] Line at [phone].    As always, Thank you for trusting us with your health care needs!      Sagrario Og [facility]            Follow-ups after your visit        Future tests that were ordered for you today     Open Future Orders        Priority Expected Expires Ordered    Comprehensive metabolic panel Routine  11/3/2018 11/3/2017            Who to contact     If you have questions or need follow up information about today's clinic visit or your schedule please contact [facility] directly at [phone].  Normal or non-critical lab and imaging results will be communicated to you by [employer], letter or phone within 4 business days after the clinic has received the results. If you do not hear from us within 7 days, please contact the clinic through [employer] or phone. If you have a critical or abnormal lab result, we will notify you by phone as soon as possible.  Submit refill requests through [employer] or call your pharmacy and they will forward the refill request to us. Please allow 3 business days for your refill to be completed.          Additional Information About Your Visit        [employer] Information     [employer] gives you secure access to your electronic health record. If you see a primary care provider, you can also send messages to your care team and make appointments. If you have questions, please call your primary care clinic.  If you do not have a primary care provider,  please call [phone] and they will assist you.        Care EveryWhere ID     This is your Care EveryWhere ID. This could be used by other organizations to access your [place] medical records  [id number]        Your Vitals Were     Pulse Temperature Pulse Oximetry BMI (Body Mass Index)          77 98  F (36.7  C) (Oral) 97% 34.47 kg/m2         Blood Pressure from Last 3 Encounters:   11/03/17 130/66   08/11/17 122/78   06/02/17 137/74    Weight from Last 3 Encounters:   11/03/17 151 lb (68.5 kg)   08/11/17 154 lb 3.2 oz (69.9 kg)   06/02/17 166 lb (75.3 kg)                 Today's Medication Changes          These changes are accurate as of: 11/3/17  3:00 PM.  If you have any questions, ask your nurse or doctor.               Start taking these medicines.        Dose/Directions    donepezil 5 MG tablet   Commonly known as:  ARICEPT   Used for:  Alzheimer's dementia without behavioral disturbance, unspecified timing of dementia onset   Started by:  Manuel Taylor MD        Dose:  5 mg   Take 1 tablet (5 mg) by mouth At Bedtime   Quantity:  30 tablet   Refills:  1            Where to get your medicines      These medications were sent to [id number] - [place] - [address] [place], [place] [address] [id number]     Phone:  [phone]     donepezil 5 MG tablet                Primary Care Provider Office Phone # Fax #    Manuel Taylor [phone] [address] [facility]  [place] [address]        Equal Access to Services     [facility] AH: Hadii alicia royal hadasho Soomaali, waaxda luqadaha, qaybta kaalmaro myers. So [facility] [phone].    ATENCIÓN: Si habla español, tiene a akhtar disposición servicios gratuitos de asistencia lingüística. Llame al [phone].    We comply with applicable federal civil rights laws and [place] laws. We do not discriminate on the basis of race, color, national origin, age, disability, sex, sexual  orientation, or gender identity.            Thank you!     Thank you for choosing [facility]  for your care. Our goal is always to provide you with excellent care. Hearing back from our patients is one way we can continue to improve our services. Please take a few minutes to complete the written survey that you may receive in the mail after your visit with us. Thank you!             Your Updated Medication List - Protect others around you: Learn how to safely use, store and throw away your medicines at www.disposemymeds.org.          This list is accurate as of: 11/3/17  3:00 PM.  Always use your most recent med list.                   Brand Name Dispense Instructions for use Diagnosis    * ACETAMINOPHEN PO      Take 500 mg by mouth every 6 hours as needed for pain        * acetaminophen 325 MG tablet    TYLENOL    100 tablet    Take 2 tablets (650 mg) by mouth every 6 hours as needed for mild pain    DDD (degenerative disc disease), cervical       amLODIPine 5 MG tablet    NORVASC     Take 5 mg by mouth        aspirin 81 MG tablet     30 tablet    Take 1 tablet (81 mg) by mouth daily        atorvastatin 20 MG tablet    LIPITOR     Take 20 mg by mouth        cholecalciferol 1000 UNIT tablet    vitamin D3    90 tablet    Take 1 tablet (1,000 Units) by mouth daily    Hypovitaminosis D       donepezil 5 MG tablet    ARICEPT    30 tablet    Take 1 tablet (5 mg) by mouth At Bedtime    Alzheimer's dementia without behavioral disturbance, unspecified timing of dementia onset       ENALAPRIL MALEATE PO      Take 20 mg by mouth        LEVOTHYROXINE SODIUM PO      Take 75 mcg by mouth daily        order for DME     1 Device    Equipment being ordered: wall bars with screws, no suction        oxybutynin 10 MG 24 hr tablet    DITROPAN-XL    60 tablet    TAKE TWO TABLETS BY MOUTH DAILY.  IF AFTER 1-2 WEEKS YOUR RESULTS ARE UNSATISFACTORY, YOU MAY INCREASE TO 3 TABLETS DAILY    OAB (overactive bladder)        piroxicam 20 MG capsule    FELDENE    30 capsule    Take 1 capsule (20 mg) by mouth daily (with breakfast)    Primary osteoarthritis of both knees       polyethylene glycol powder    MIRALAX    510 g    Take 17 g (1 capful) by mouth daily    Chronic idiopathic constipation       warfarin 5 MG tablet    COUMADIN    32 tablet    Take 1 tablet (5 mg) by mouth daily    Deep vein thrombosis (DVT) of proximal lower extremity, unspecified chronicity, unspecified laterality (H), Other chronic pulmonary embolism without acute cor pulmonale (H)       * Notice:  This list has 2 medication(s) that are the same as other medications prescribed for you. Read the directions carefully, and ask your doctor or other care provider to review them with you.

## 2017-11-03 NOTE — Clinical Note
I had asked my scribe to add to the patient after-visit summary some additional information and this did not happen. Here's the plan  1. Pre-visit laboratory studies orders placed 2. Call marie Bey and arrange office visit in January with laboratory appointment 1 week prior

## 2017-11-03 NOTE — NURSING NOTE
"Chief Complaint   Patient presents with     RECHECK     follow up on neurology       Initial /66  Pulse 77  Temp 98  F (36.7  C) (Oral)  Wt 151 lb (68.5 kg)  SpO2 97%  BMI 34.47 kg/m2 Estimated body mass index is 34.47 kg/(m^2) as calculated from the following:    Height as of 6/2/17: 4' 7.5\" (1.41 m).    Weight as of this encounter: 151 lb (68.5 kg).  Medication Reconciliation: complete   Heidy Villalta MA    "

## 2017-11-03 NOTE — PROGRESS NOTES

## 2017-11-07 ENCOUNTER — TELEPHONE (OUTPATIENT)
Dept: INTERNAL MEDICINE | Facility: CLINIC | Age: 77
End: 2017-11-07

## 2017-11-07 NOTE — TELEPHONE ENCOUNTER
I had asked my scribe to add to the patient after-visit summary some additional information and this did not happen. Here's the plan     1. Pre-visit laboratory studies orders placed   2. Call son Sotero and arrange office visit in January with laboratory appointment 1 week prior     Manuel Taylor MD

## 2017-11-08 NOTE — TELEPHONE ENCOUNTER
Patient is scheduled for lab appointment on Friday, December 22nd at 9:30 a.m., and office visit with Dr. Taylor on Friday, December 29th at 2:50 p.m. Shanika Faye,

## 2017-11-08 NOTE — PROGRESS NOTES
Date: 10/24/2017  Staff: ROBI  Tech: --  NAME:  Tana Caceres  MRN:  [id number]  :  1940  Age: 76  Sex: F  Educ:  16 (Univ. education in [place])    [employer] (Italian Version)   Vis-Spat/Exec: 2/5   Namin/3   Word List: 1, 3/5   Digits:  2/2   Tappin   Serial 7s: 0/3   Sent. Rep.:    Letter Fluency: 0   Abstraction: 1   Delayed Recall:    Orientation: 3/6     Orientation     Time  -62 (taken from [employer])     Place  0/     Personal info     Animal Naming Test   Raw: 9  M(SD) for Italian norms:  16.37(3.70)    GAD7 (Italian Version): 0  PHQ9 (Italian Version): 0

## 2017-11-16 ENCOUNTER — ANTICOAGULATION THERAPY VISIT (OUTPATIENT)
Dept: NURSING | Facility: CLINIC | Age: 77
End: 2017-11-16

## 2017-11-16 DIAGNOSIS — I26.99 PULMONARY EMBOLISM WITH INFARCTION (H): ICD-10-CM

## 2017-11-16 DIAGNOSIS — I82.409 DVT (DEEP VENOUS THROMBOSIS) (H): ICD-10-CM

## 2017-11-16 DIAGNOSIS — Z79.01 LONG-TERM (CURRENT) USE OF ANTICOAGULANTS: ICD-10-CM

## 2017-11-16 LAB — INR PPP: 1.8

## 2017-11-16 NOTE — MR AVS SNAPSHOT
Tana Ramosherb   11/16/2017   Anticoagulation Therapy Visit    Description:  76 year old female   Provider:  Manuel Taylor MD   Department:  [facility]           INR as of 11/16/2017     Today's INR 1.8!      Anticoagulation Summary as of 11/16/2017     INR goal 2.0-3.0   Today's INR 1.8!   Full instructions 5 mg every day   Next INR check 11/30/2017    Indications   DVT (deep venous thrombosis) (H) [I82.409]  Long-term (current) use of anticoagulants [Z79.01] [Z79.01]  Pulmonary embolism with infarction (H) [I26.99] [I26.99]         Contact Numbers     [facility]  Please call [phone] to cancel and/or reschedule your appointment   Please call [phone] with any problems or questions regarding your therapy.        November 2017 Details    Sun Mon Tue Wed Thu Fri Sat        1               2               3               4                 5               6               7               8               9               10               11                 12               13               14               15               16      5 mg   See details      17      5 mg         18      5 mg           19      5 mg         20      5 mg         21      5 mg         22      5 mg         23      5 mg         24      5 mg         25      5 mg           26      5 mg         27      5 mg         28      5 mg         29      5 mg         30               Date Details   11/16 This INR check       Date of next INR:  11/30/2017         How to take your warfarin dose     To take:  5 mg Take 1 of the 5 mg tablets.

## 2017-11-16 NOTE — PROGRESS NOTES
ANTICOAGULATION FOLLOW-UP CLINIC VISIT    Patient Name:  Tana Caceres  Date:  11/16/2017  Contact Type:  Telephone/ Rupal    SUBJECTIVE:     Patient Findings     Positives Missed doses, No Problem Findings    Comments S/O:  Rupal from  home care calls with patients INR today of 1.8.  Tana Caceres is on Coumadin for DVT.  Current Coumadin dose is 5 mg daily.   Concerns today are: None Noted.    A/P: Tana Caceres's INR is Subtherapeutic  for his/her goal range of 2 - 3.  Reasons why INR is out of range may include: Patient missed 2 days this past week but not sure which days.  Recommended to have Tana Caceres remain on the same Coumadin dose and to have his/her INR rechecked in 2 weeks on 11/30.      Marina Mohr RN BC             OBJECTIVE    INR   Date Value Ref Range Status   11/16/2017 1.8  Final       ASSESSMENT / PLAN  No question data found.  Anticoagulation Summary as of 11/16/2017     INR goal 2.0-3.0   Today's INR 1.8!   Maintenance plan 5 mg (5 mg x 1) every day   Full instructions 5 mg every day   Weekly total 35 mg   No change documented Marina Mohr RN   Plan last modified Marina Mohr RN (9/22/2017)   Next INR check 11/30/2017   Target end date Indefinite    Indications   DVT (deep venous thrombosis) (H) [I82.409]  Long-term (current) use of anticoagulants [Z79.01] [Z79.01]  Pulmonary embolism with infarction (H) [I26.99] [I26.99]         Anticoagulation Episode Summary     INR check location     Preferred lab     Send INR reminders to [facility] CLINIC    Comments       Anticoagulation Care Providers     Provider Role Specialty Phone number    Manuel Taylor MD Responsible Internal Medicine [phone]            See the Encounter Report to view Anticoagulation Flowsheet and Dosing Calendar (Go to Encounters tab in chart review, and find the Anticoagulation Therapy Visit)    Dosage adjustment made based on physician directed care plan.    Marina Mohr RN

## 2017-11-27 ENCOUNTER — TELEPHONE (OUTPATIENT)
Dept: FAMILY MEDICINE | Facility: CLINIC | Age: 77
End: 2017-11-27

## 2017-11-27 NOTE — TELEPHONE ENCOUNTER
Reason for call:  Order   Order or referral being requested:   Reason for request: to address insurance concerns  Date needed: as soon as possible  Has the patient been seen by the PCP for this problem? YES    Additional comments: Need verbal today      Phone number to reach patient:  Other phone number:  [phone]  Best Time:  8-5    Can we leave a detailed message on this number?  YES

## 2017-11-27 NOTE — TELEPHONE ENCOUNTER
Left message on Karine' VM with verbal orders as requested.   RN hotline number given for further questions.    Bennett Shaw RN

## 2017-11-30 ENCOUNTER — ANTICOAGULATION THERAPY VISIT (OUTPATIENT)
Dept: NURSING | Facility: CLINIC | Age: 77
End: 2017-11-30

## 2017-11-30 DIAGNOSIS — I26.99 PULMONARY EMBOLISM WITH INFARCTION (H): ICD-10-CM

## 2017-11-30 DIAGNOSIS — I82.409 DVT (DEEP VENOUS THROMBOSIS) (H): ICD-10-CM

## 2017-11-30 DIAGNOSIS — Z79.01 LONG-TERM (CURRENT) USE OF ANTICOAGULANTS: ICD-10-CM

## 2017-11-30 LAB — INR PPP: 2.8

## 2017-11-30 NOTE — MR AVS SNAPSHOT
Tana Caceres   11/30/2017   Anticoagulation Therapy Visit    Description:  77 year old female   Provider:  Manuel Taylor MD   Department:  [facility]           INR as of 11/30/2017     Today's INR 2.8      Anticoagulation Summary as of 11/30/2017     INR goal 2.0-3.0   Today's INR 2.8   Full instructions 5 mg every day   Next INR check 12/14/2017    Indications   DVT (deep venous thrombosis) (H) [I82.409]  Long-term (current) use of anticoagulants [Z79.01] [Z79.01]  Pulmonary embolism with infarction (H) [I26.99] [I26.99]         Contact Numbers     [facility]  Please call [phone] to cancel and/or reschedule your appointment   Please call [phone] with any problems or questions regarding your therapy.        November 2017 Details    Sun Mon Tue Wed Thu Fri Sat        1               2               3               4                 5               6               7               8               9               10               11                 12               13               14               15               16               17               18                 19               20               21               22               23               24               25                 26               27               28               29               30      5 mg   See details         Date Details   11/30 This INR check               How to take your warfarin dose     To take:  5 mg Take 1 of the 5 mg tablets.           December 2017 Details    Sun Mon Tue Wed Thu Fri Sat          1      5 mg         2      5 mg           3      5 mg         4      5 mg         5      5 mg         6      5 mg         7      5 mg         8      5 mg         9      5 mg           10      5 mg         11      5 mg         12      5 mg         13      5 mg         14            15               16                 17               18               19               20               21               22               23                  24               25               26               27               28               29               30                 31                      Date Details   No additional details    Date of next INR:  12/14/2017         How to take your warfarin dose     To take:  5 mg Take 1 of the 5 mg tablets.

## 2017-11-30 NOTE — PROGRESS NOTES
ANTICOAGULATION FOLLOW-UP CLINIC VISIT    Patient Name:  Tana Caceres  Date:  11/30/2017  Contact Type:  Telephone/ Rupal [phone]    SUBJECTIVE:     Patient Findings     Positives No Problem Findings    Comments S/O:  Rupal from  home care calls with patients INR today of 2.8.  Tana Caceres is on Coumadin for DVT and PE.  Current Coumadin dose is 5 mg daily.   Concerns today are: None Noted.    A/P: Tana Caceres's INR is Therapeutic  for his/her goal range of 2 - 3.  Reasons why INR is out of range may include: na  Recommended to have Tana Caceres remain on the same Coumadin dose and to have his/her INR rechecked in 2 weeks on 12/14.      Marina Mohr RN BC             OBJECTIVE    INR   Date Value Ref Range Status   11/30/2017 2.8  Final       ASSESSMENT / PLAN  No question data found.  Anticoagulation Summary as of 11/30/2017     INR goal 2.0-3.0   Today's INR 2.8   Maintenance plan 5 mg (5 mg x 1) every day   Full instructions 5 mg every day   Weekly total 35 mg   No change documented Marina Mohr RN   Plan last modified Marina Mohr RN (9/22/2017)   Next INR check 12/14/2017   Target end date Indefinite    Indications   DVT (deep venous thrombosis) (H) [I82.409]  Long-term (current) use of anticoagulants [Z79.01] [Z79.01]  Pulmonary embolism with infarction (H) [I26.99] [I26.99]         Anticoagulation Episode Summary     INR check location     Preferred lab     Send INR reminders to [facility] CLINIC    Comments       Anticoagulation Care Providers     Provider Role Specialty Phone number    Manuel Taylor MD [facility] Internal Medicine [phone]            See the Encounter Report to view Anticoagulation Flowsheet and Dosing Calendar (Go to Encounters tab in chart review, and find the Anticoagulation Therapy Visit)    Dosage adjustment made based on physician directed care plan.    Marina Mohr RN

## 2017-12-01 DIAGNOSIS — E03.9 ACQUIRED HYPOTHYROIDISM: ICD-10-CM

## 2017-12-01 DIAGNOSIS — G30.9 ALZHEIMER'S DEMENTIA WITHOUT BEHAVIORAL DISTURBANCE, UNSPECIFIED TIMING OF DEMENTIA ONSET: ICD-10-CM

## 2017-12-01 DIAGNOSIS — E78.5 HYPERLIPIDEMIA LDL GOAL <100: ICD-10-CM

## 2017-12-01 DIAGNOSIS — F02.80 ALZHEIMER'S DEMENTIA WITHOUT BEHAVIORAL DISTURBANCE, UNSPECIFIED TIMING OF DEMENTIA ONSET: ICD-10-CM

## 2017-12-01 DIAGNOSIS — Z90.5 HISTORY OF NEPHRECTOMY: ICD-10-CM

## 2017-12-01 DIAGNOSIS — I10 HYPERTENSION GOAL BP (BLOOD PRESSURE) < 140/90: ICD-10-CM

## 2017-12-01 LAB
ALBUMIN SERPL-MCNC: 3.8 G/DL (ref 3.4–5)
ALP SERPL-CCNC: 105 U/L (ref 40–150)
ALT SERPL W P-5'-P-CCNC: 22 U/L (ref 0–50)
ANION GAP SERPL CALCULATED.3IONS-SCNC: 5 MMOL/L (ref 3–14)
AST SERPL W P-5'-P-CCNC: 17 U/L (ref 0–45)
BASOPHILS # BLD AUTO: 0 10E9/L (ref 0–0.2)
BASOPHILS NFR BLD AUTO: 0.5 %
BILIRUB SERPL-MCNC: 0.4 MG/DL (ref 0.2–1.3)
BUN SERPL-MCNC: 19 MG/DL (ref 7–30)
CALCIUM SERPL-MCNC: 9.6 MG/DL (ref 8.5–10.1)
CHLORIDE SERPL-SCNC: 105 MMOL/L (ref 94–109)
CHOLEST SERPL-MCNC: 167 MG/DL
CO2 SERPL-SCNC: 29 MMOL/L (ref 20–32)
CREAT SERPL-MCNC: 0.69 MG/DL (ref 0.52–1.04)
DIFFERENTIAL METHOD BLD: ABNORMAL
EOSINOPHIL # BLD AUTO: 0.1 10E9/L (ref 0–0.7)
EOSINOPHIL NFR BLD AUTO: 1.4 %
ERYTHROCYTE [DISTWIDTH] IN BLOOD BY AUTOMATED COUNT: 15.8 % (ref 10–15)
GFR SERPL CREATININE-BSD FRML MDRD: 82 ML/MIN/1.7M2
GLUCOSE SERPL-MCNC: 78 MG/DL (ref 70–99)
HCT VFR BLD AUTO: 44 % (ref 35–47)
HDLC SERPL-MCNC: 74 MG/DL
HGB BLD-MCNC: 14 G/DL (ref 11.7–15.7)
LDLC SERPL CALC-MCNC: 78 MG/DL
LYMPHOCYTES # BLD AUTO: 1.7 10E9/L (ref 0.8–5.3)
LYMPHOCYTES NFR BLD AUTO: 40.5 %
MCH RBC QN AUTO: 29.3 PG (ref 26.5–33)
MCHC RBC AUTO-ENTMCNC: 31.8 G/DL (ref 31.5–36.5)
MCV RBC AUTO: 92 FL (ref 78–100)
MONOCYTES # BLD AUTO: 0.3 10E9/L (ref 0–1.3)
MONOCYTES NFR BLD AUTO: 7.8 %
NEUTROPHILS # BLD AUTO: 2.1 10E9/L (ref 1.6–8.3)
NEUTROPHILS NFR BLD AUTO: 49.8 %
NONHDLC SERPL-MCNC: 93 MG/DL
PLATELET # BLD AUTO: 581 10E9/L (ref 150–450)
POTASSIUM SERPL-SCNC: 4.7 MMOL/L (ref 3.4–5.3)
PROT SERPL-MCNC: 8.3 G/DL (ref 6.8–8.8)
RBC # BLD AUTO: 4.78 10E12/L (ref 3.8–5.2)
SODIUM SERPL-SCNC: 139 MMOL/L (ref 133–144)
TRIGL SERPL-MCNC: 73 MG/DL
TSH SERPL DL<=0.005 MIU/L-ACNC: 1.06 MU/L (ref 0.4–4)
WBC # BLD AUTO: 4.3 10E9/L (ref 4–11)

## 2017-12-01 PROCEDURE — 36415 COLL VENOUS BLD VENIPUNCTURE: CPT | Performed by: INTERNAL MEDICINE

## 2017-12-01 PROCEDURE — 80061 LIPID PANEL: CPT | Performed by: INTERNAL MEDICINE

## 2017-12-01 PROCEDURE — 80050 GENERAL HEALTH PANEL: CPT | Performed by: INTERNAL MEDICINE

## 2017-12-07 PROCEDURE — G0179 MD RECERTIFICATION HHA PT: HCPCS | Performed by: INTERNAL MEDICINE

## 2017-12-14 ENCOUNTER — MEDICAL CORRESPONDENCE (OUTPATIENT)
Dept: HEALTH INFORMATION MANAGEMENT | Facility: CLINIC | Age: 77
End: 2017-12-14

## 2017-12-14 ENCOUNTER — OFFICE VISIT (OUTPATIENT)
Dept: INTERNAL MEDICINE | Facility: CLINIC | Age: 77
End: 2017-12-14
Payer: COMMERCIAL

## 2017-12-14 VITALS
HEART RATE: 74 BPM | OXYGEN SATURATION: 96 % | WEIGHT: 147.2 LBS | BODY MASS INDEX: 33.6 KG/M2 | DIASTOLIC BLOOD PRESSURE: 80 MMHG | TEMPERATURE: 97.1 F | SYSTOLIC BLOOD PRESSURE: 138 MMHG

## 2017-12-14 DIAGNOSIS — N32.81 OAB (OVERACTIVE BLADDER): ICD-10-CM

## 2017-12-14 DIAGNOSIS — I27.82 OTHER CHRONIC PULMONARY EMBOLISM WITHOUT ACUTE COR PULMONALE (H): ICD-10-CM

## 2017-12-14 DIAGNOSIS — E03.9 ACQUIRED HYPOTHYROIDISM: ICD-10-CM

## 2017-12-14 DIAGNOSIS — G30.9 ALZHEIMER'S DEMENTIA WITHOUT BEHAVIORAL DISTURBANCE, UNSPECIFIED TIMING OF DEMENTIA ONSET: Primary | ICD-10-CM

## 2017-12-14 DIAGNOSIS — M17.0 PRIMARY OSTEOARTHRITIS OF BOTH KNEES: ICD-10-CM

## 2017-12-14 DIAGNOSIS — F02.80 ALZHEIMER'S DEMENTIA WITHOUT BEHAVIORAL DISTURBANCE, UNSPECIFIED TIMING OF DEMENTIA ONSET: Primary | ICD-10-CM

## 2017-12-14 DIAGNOSIS — Z23 NEED FOR PROPHYLACTIC VACCINATION WITH TETANUS-DIPHTHERIA (TD): ICD-10-CM

## 2017-12-14 DIAGNOSIS — I10 HYPERTENSION GOAL BP (BLOOD PRESSURE) < 140/90: ICD-10-CM

## 2017-12-14 DIAGNOSIS — E78.5 HYPERLIPIDEMIA LDL GOAL <100: ICD-10-CM

## 2017-12-14 DIAGNOSIS — I82.4Y9 DEEP VEIN THROMBOSIS (DVT) OF PROXIMAL LOWER EXTREMITY, UNSPECIFIED CHRONICITY, UNSPECIFIED LATERALITY (H): ICD-10-CM

## 2017-12-14 DIAGNOSIS — Z91.81 AT RISK FOR FALLING: ICD-10-CM

## 2017-12-14 PROCEDURE — 99214 OFFICE O/P EST MOD 30 MIN: CPT | Performed by: INTERNAL MEDICINE

## 2017-12-14 RX ORDER — DONEPEZIL HYDROCHLORIDE 5 MG/1
5 TABLET, FILM COATED ORAL AT BEDTIME
Qty: 30 TABLET | Refills: 1 | Status: CANCELLED | OUTPATIENT
Start: 2017-12-14

## 2017-12-14 RX ORDER — ATORVASTATIN CALCIUM 20 MG/1
20 TABLET, FILM COATED ORAL DAILY
Qty: 90 TABLET | Refills: 3 | Status: SHIPPED | OUTPATIENT
Start: 2017-12-14 | End: 2018-05-03

## 2017-12-14 RX ORDER — AMLODIPINE BESYLATE 5 MG/1
5 TABLET ORAL DAILY
Qty: 90 TABLET | Refills: 3 | Status: SHIPPED | OUTPATIENT
Start: 2017-12-14 | End: 2018-05-03

## 2017-12-14 RX ORDER — WARFARIN SODIUM 5 MG/1
5 TABLET ORAL DAILY
Qty: 90 TABLET | Refills: 3 | Status: SHIPPED | OUTPATIENT
Start: 2017-12-14 | End: 2018-05-03

## 2017-12-14 RX ORDER — DONEPEZIL HYDROCHLORIDE 10 MG/1
10 TABLET, FILM COATED ORAL AT BEDTIME
Qty: 90 TABLET | Refills: 3 | Status: SHIPPED | OUTPATIENT
Start: 2017-12-14 | End: 2018-05-03

## 2017-12-14 RX ORDER — LEVOTHYROXINE SODIUM 75 UG/1
75 CAPSULE ORAL DAILY
Qty: 90 CAPSULE | Refills: 3 | Status: SHIPPED | OUTPATIENT
Start: 2017-12-14 | End: 2018-05-03

## 2017-12-14 RX ORDER — ENALAPRIL MALEATE 20 MG/1
20 TABLET ORAL DAILY
Qty: 90 TABLET | Refills: 3 | Status: SHIPPED | OUTPATIENT
Start: 2017-12-14 | End: 2018-05-03

## 2017-12-14 RX ORDER — OXYBUTYNIN CHLORIDE 10 MG/1
TABLET, EXTENDED RELEASE ORAL
Qty: 180 TABLET | Refills: 3 | Status: SHIPPED | OUTPATIENT
Start: 2017-12-14 | End: 2018-05-03

## 2017-12-14 NOTE — NURSING NOTE
"Chief Complaint   Patient presents with     RECHECK     follow-up/discuss lab results       Initial /80  Pulse 74  Temp 97.1  F (36.2  C) (Oral)  Wt 147 lb 3.2 oz (66.8 kg)  SpO2 96%  BMI 33.6 kg/m2 Estimated body mass index is 33.6 kg/(m^2) as calculated from the following:    Height as of 6/2/17: 4' 7.5\" (1.41 m).    Weight as of this encounter: 147 lb 3.2 oz (66.8 kg).  Medication Reconciliation: complete     Tawny Red MA    "

## 2017-12-14 NOTE — PATIENT INSTRUCTIONS
- Increase Aricept to 10 milligrams. You can take 2 tablets of the 5 milligram dose until that bottle is done.    - Follow up with me in 3 to 6 months, or sooner if concerned.     [facility]    If you have any questions regarding to your visit please contact your care team:     Team Pink:   Clinic Hours Telephone Number   Internal Medicine:  Dr. Pat Styles, NP       7am-7pm  Monday - Thursday   7am-5pm  Fridays  [phone]  (Appointment scheduling available 24/7)    Questions about your visit?  Team Line  [phone]   Urgent Care - [place] and [place] - 11am-9pm Monday-Friday Saturday-Sunday- 9am-5pm   [place] - 5pm-9pm Monday-Friday Saturday-Sunday- 9am-5pm  [phone] - [place]   [phone] - [place]       What options do I have for visits at the clinic other than the traditional office visit?  To expand how we care for you, many of our providers are utilizing electronic visits (e-visits) and telephone visits, when medically appropriate, for interactions with their patients rather than a visit in the clinic.   We also offer nurse visits for many medical concerns. Just like any other service, we will bill your insurance company for this type of visit based on time spent on the phone with your provider. Not all insurance companies cover these visits. Please check with your medical insurance if this type of visit is covered. You will be responsible for any charges that are not paid by your insurance.      E-visits via [employer]:  generally incur a $35.00 fee.  Telephone visits:  Time spent on the phone: *charged based on time that is spent on the phone in increments of 10 minutes. Estimated cost:   5-10 mins $30.00   11-20 mins. $59.00   21-30 mins. $85.00   Use [employer] (secure email communication and access to your chart) to send your primary care provider a message or make an appointment. Ask someone on your Team how to sign up  for [employer].    For a Price Quote for your services, please call our Consumer Price Line at [phone].    As always, Thank you for trusting us with your health care needs!    Liseth Ruiz CMA

## 2017-12-14 NOTE — PROGRESS NOTES
SUBJECTIVE:   Tana Caceres is a 77 year old female who presents to clinic today with her son who is translating for the following health issues:       Alzheimer's dementia without behavioral disturbance, unspecified timing of dementia onset  At risk for falling  Need for prophylactic vaccination with tetanus-diphtheria (TD)  OAB (overactive bladder)  Deep vein thrombosis (DVT) of proximal lower extremity, unspecified chronicity, unspecified laterality (H)  Other chronic pulmonary embolism without acute cor pulmonale (H)  Hypertension goal BP (blood pressure) < 140/90  Primary osteoarthritis of both knees  Hyperlipidemia LDL goal <100  Acquired hypothyroidism     I am seeing patient in follow up for multiple different reasons. Mainly to review her reaction to Donepezil (ARICEPT) 5 milligram and possibly increase dose, otherwise patient needs refills essentially on all different medications    Alzheimer's -- Patient has not noticed any negative side effects from the start of Aricept. The son has asked her about improvement, but they have not noticed much. He is mainly concerned about her memory at this point. She seems to be doing okay at home. She has been doing chores and taking care of children.     Problem list and histories reviewed & adjusted, as indicated.  Additional history: as documented    Patient Active Problem List   Diagnosis     Hypertension goal BP (blood pressure) < 140/90     DVT (deep venous thrombosis) (H)     Acquired hypothyroidism     Factor V Leiden (H)     Unilateral sensorineural hearing loss     Mixed hearing loss     Osteoporosis     History of nephrectomy     Adrenal mass, left (H)     Right pontine CVA (H)     DO (dyspnea on exertion)     Primary osteoarthritis of knee     Morbid obesity (H)     Physical deconditioning     Dysgeusia     Cataract     LAP-BAND surgery status     Left adrenal mass (H)     Age-related osteoporosis without current pathological fracture     Hyperlipidemia LDL  goal <100     Long term current use of anticoagulant therapy     Hyperglobulinemia     History of partial thyroidectomy     Microscopic hematuria     Persistent insomnia     MCI (mild cognitive impairment)     Other chronic pulmonary embolism without acute cor pulmonale (H)     Long-term (current) use of anticoagulants [Z79.01]     Pulmonary embolism with infarction (H) [I26.99]     Past Surgical History:   Procedure Laterality Date     C PARTIAL EXCISION THYROID,BILAT       CATARACT IOL, RT/LT  8/14/2014    Procedure: EXTRACTION, CATARACT, WITH INTRAOCULAR LENS INSERTION, POSTERIOR CHAMBER; Laterality: Right; Surgeon: Eufemia Chatman MD; Service: Ophthalmology     CATARACT IOL, RT/LT  11/10/2014    Procedure: EXTRACTION, CATARACT, WITH INTRAOCULAR LENS INSERTION, POSTERIOR CHAMBER; Laterality: Left; Surgeon: Gulshan Pickering MD; Service: Ophthalmology     NEPHRECTOMY  1980s     SURGICAL HISTORY OF -       R ear surgery     TYMPANOPLASTY  10/14/2014    Procedure: TYMPANOPLASTY; Laterality: Right; Surgeon: Stephen Abarca MD; Service: Otolaryngology     XR LAP BAND      uncertain       Social History   Substance Use Topics     Smoking status: Never Smoker     Smokeless tobacco: Never Used     Alcohol use No     Family History   Problem Relation Age of Onset     Hypertension Father      Glaucoma Father      Hypertension Mother      Hypertension Brother      Glaucoma Brother      Ovarian Cancer No family hx of      Cancer - colorectal No family hx of      Breast Cancer No family hx of          Current Outpatient Prescriptions   Medication Sig Dispense Refill     oxybutynin (DITROPAN-XL) 10 MG 24 hr tablet TAKE TWO TABLETS BY MOUTH DAILY.  IF AFTER 1-2 WEEKS YOUR RESULTS ARE UNSATISFACTORY, YOU MAY INCREASE TO 3 TABLETS DAILY 60 tablet 9     warfarin (COUMADIN) 5 MG tablet Take 1 tablet (5 mg) by mouth daily 32 tablet 2     cholecalciferol (VITAMIN D) 1000 UNIT tablet Take 1 tablet (1,000 Units) by mouth daily 90  tablet 3     amLODIPine (NORVASC) 5 MG tablet Take 5 mg by mouth       atorvastatin (LIPITOR) 20 MG tablet Take 20 mg by mouth       aspirin 81 MG tablet Take 1 tablet (81 mg) by mouth daily 30 tablet 11     order for DME Equipment being ordered: wall bars with screws, no suction 1 Device 0     polyethylene glycol (MIRALAX) powder Take 17 g (1 capful) by mouth daily 510 g 1     piroxicam (FELDENE) 20 MG capsule Take 1 capsule (20 mg) by mouth daily (with breakfast) 30 capsule 1     LEVOTHYROXINE SODIUM PO Take 75 mcg by mouth daily       ENALAPRIL MALEATE PO Take 20 mg by mouth       ACETAMINOPHEN PO Take 500 mg by mouth every 6 hours as needed for pain       acetaminophen (TYLENOL) 325 MG tablet Take 2 tablets (650 mg) by mouth every 6 hours as needed for mild pain 100 tablet 1     Labs reviewed in EPIC      Reviewed and updated as needed this visit by clinical staffTobacco  Allergies  Meds       Reviewed and updated as needed this visit by Provider         ROS:  Constitutional, HEENT, cardiovascular, pulmonary, GI, , musculoskeletal, neuro, skin, endocrine and psych systems are negative, except as otherwise noted.    This document serves as a record of the services and decisions personally performed and made by Manuel Taylor MD. It was created on their behalf by Hi Nesbitt, a trained medical scribe. The creation of this document is based the provider's statements to the medical scribe.  Hi Nesbitt December 14, 2017 4:25 PM    OBJECTIVE:   /80  Pulse 74  Temp 97.1  F (36.2  C) (Oral)  Wt 147 lb 3.2 oz (66.8 kg)  SpO2 96%  BMI 33.6 kg/m2  Body mass index is 33.6 kg/(m^2).   BP Readings from Last 3 Encounters:   12/14/17 138/80   11/03/17 130/66   08/11/17 122/78       GENERAL: obese, alert and no distress, communication provided by the son, appropriate grooming and affect, wearing a traditional hijab   EYES: Eyes grossly normal to inspection, EOMI, conjunctivae and sclerae normal  SKIN: no  suspicious lesions or rashes to visible skin   NEURO: mentation intact and speech normal, patient seems to be more present than the last visit  PSYCH: mentation appears normal, affect normal/bright    Diagnostic Test Results:  Results for orders placed or performed in visit on 12/01/17   Comprehensive metabolic panel   Result Value Ref Range    Sodium 139 133 - 144 mmol/L    Potassium 4.7 3.4 - 5.3 mmol/L    Chloride 105 94 - 109 mmol/L    Carbon Dioxide 29 20 - 32 mmol/L    Anion Gap 5 3 - 14 mmol/L    Glucose 78 70 - 99 mg/dL    Urea Nitrogen 19 7 - 30 mg/dL    Creatinine 0.69 0.52 - 1.04 mg/dL    GFR Estimate 82 >60 mL/min/1.7m2    GFR Estimate If Black >90 >60 mL/min/1.7m2    Calcium 9.6 8.5 - 10.1 mg/dL    Bilirubin Total 0.4 0.2 - 1.3 mg/dL    Albumin 3.8 3.4 - 5.0 g/dL    Protein Total 8.3 6.8 - 8.8 g/dL    Alkaline Phosphatase 105 40 - 150 U/L    ALT 22 0 - 50 U/L    AST 17 0 - 45 U/L   Lipid panel reflex to direct LDL Fasting   Result Value Ref Range    Cholesterol 167 <200 mg/dL    Triglycerides 73 <150 mg/dL    HDL Cholesterol 74 >49 mg/dL    LDL Cholesterol Calculated 78 <100 mg/dL    Non HDL Cholesterol 93 <130 mg/dL   CBC with platelets differential   Result Value Ref Range    WBC 4.3 4.0 - 11.0 10e9/L    RBC Count 4.78 3.8 - 5.2 10e12/L    Hemoglobin 14.0 11.7 - 15.7 g/dL    Hematocrit 44.0 35.0 - 47.0 %    MCV 92 78 - 100 fl    MCH 29.3 26.5 - 33.0 pg    MCHC 31.8 31.5 - 36.5 g/dL    RDW 15.8 (H) 10.0 - 15.0 %    Platelet Count 581 (H) 150 - 450 10e9/L    Diff Method Automated Method     % Neutrophils 49.8 %    % Lymphocytes 40.5 %    % Monocytes 7.8 %    % Eosinophils 1.4 %    % Basophils 0.5 %    Absolute Neutrophil 2.1 1.6 - 8.3 10e9/L    Absolute Lymphocytes 1.7 0.8 - 5.3 10e9/L    Absolute Monocytes 0.3 0.0 - 1.3 10e9/L    Absolute Eosinophils 0.1 0.0 - 0.7 10e9/L    Absolute Basophils 0.0 0.0 - 0.2 10e9/L   **TSH with free T4 reflex FUTURE 1yr   Result Value Ref Range    TSH 1.06 0.40 - 4.00  mU/L     ASSESSMENT/PLAN:     (G30.9,  F02.80) Alzheimer's dementia without behavioral disturbance, unspecified timing of dementia onset  (primary encounter diagnosis)  Comment: positive response without side effects . Dose increased to 10 milligrams  Plan: donepezil (ARICEPT) 10 MG tablet            (Z91.81) At risk for falling  Comment:   Plan:     (Z23) Need for prophylactic vaccination with tetanus-diphtheria (TD)  Comment:   Plan:     (N32.81) OAB (overactive bladder)  Comment: refills provided   Plan: oxybutynin (DITROPAN-XL) 10 MG 24 hr tablet            (I82.4Y9) Deep vein thrombosis (DVT) of proximal lower extremity, unspecified chronicity, unspecified laterality (H)  Comment: continue current plan of care     Plan: warfarin (COUMADIN) 5 MG tablet, aspirin 81 MG         tablet            (I27.82) Other chronic pulmonary embolism without acute cor pulmonale (H)  Comment: continue current plan of care     Plan: warfarin (COUMADIN) 5 MG tablet, aspirin 81 MG         tablet            (I10) Hypertension goal BP (blood pressure) < 140/90  Comment: controlled within acceptable limits , continue current plan of care    Plan: amLODIPine (NORVASC) 5 MG tablet, enalapril         (VASOTEC) 20 MG tablet            (M17.0) Primary osteoarthritis of both knees  Comment: she uses occasional over the counter nonprescription medication but not the piroxicam (FELDENE)   Plan: removed from the medication administration record     (E78.5) Hyperlipidemia LDL goal <100  Comment: continue current plan of care     Plan: atorvastatin (LIPITOR) 20 MG tablet            (E03.9) Acquired hypothyroidism  Comment: continue current plan of care     Plan: Levothyroxine Sodium 75 MCG CAPS        Recheck 6-12 months       Patient Instructions   - Increase Aricept to 10 milligrams. You can take 2 tablets of the 5 milligram dose until that bottle is done.    - Follow up with me in 3 to 6 months, or sooner if concerned.     The information in  this document, created by the medical scribe for me, accurately reflects the services I personally performed and the decisions made by me. I have reviewed and approved this document for accuracy.   MD Manuel Valles MD  [facility]

## 2017-12-14 NOTE — MR AVS SNAPSHOT
After Visit Summary   12/14/2017    Tana Caceres    MRN: [id number]           Patient Information     Date Of Birth          1940        Visit Information        Provider Department      12/14/2017 3:50 PM Manuel Taylor MD [facility]        Today's Diagnoses     Alzheimer's dementia without behavioral disturbance, unspecified timing of dementia onset    -  1    At risk for falling        Need for prophylactic vaccination with tetanus-diphtheria (TD)        OAB (overactive bladder)        Deep vein thrombosis (DVT) of proximal lower extremity, unspecified chronicity, unspecified laterality (H)        Other chronic pulmonary embolism without acute cor pulmonale (H)        Hypertension goal BP (blood pressure) < 140/90        Primary osteoarthritis of both knees        Hyperlipidemia LDL goal <100        Acquired hypothyroidism          Care Instructions    - Increase Aricept to 10 milligrams. You can take 2 tablets of the 5 milligram dose until that bottle is done.    - Follow up with me in 3 to 6 months, or sooner if concerned.     [facility]    If you have any questions regarding to your visit please contact your care team:     Team Pink:   Clinic Hours Telephone Number   Internal Medicine:  Dr. Pat Styles NP       7am-7pm  Monday - Thursday   7am-5pm  Fridays  [phone]  (Appointment scheduling available 24/7)    Questions about your visit?  Team Line  [phone]   Urgent [facility] - [place] - 11am-9pm Monday-Friday Saturday-Sunday- 9am-5pm   [place] - 5pm-9pm Monday-Friday Saturday-Sunday- 9am-5pm  [phone] - [place]   [phone] [facility]       What options do I have for visits at the clinic other than the traditional office visit?  To expand how we care for you, many of our providers are utilizing electronic visits (e-visits) and telephone visits, when medically appropriate, for  interactions with their patients rather than a visit in the clinic.   We also offer nurse visits for many medical concerns. Just like any other service, we will bill your insurance company for this type of visit based on time spent on the phone with your provider. Not all insurance companies cover these visits. Please check with your medical insurance if this type of visit is covered. You will be responsible for any charges that are not paid by your insurance.      E-visits via [employer]:  generally incur a $35.00 fee.  Telephone visits:  Time spent on the phone: *charged based on time that is spent on the phone in increments of 10 minutes. Estimated cost:   5-10 mins $30.00   11-20 mins. $59.00   21-30 mins. $85.00   Use [employer] system (secure email communication and access to your chart) to send your primary care provider a message or make an appointment. Ask someone on your Team how to sign up for [employer] system.    For a Price Quote for your services, please call our [employer] Line at [phone].    As always, Thank you for trusting us with your health care needs!    Liseth Ruiz, [facility]          Follow-ups after your visit        Who to contact     If you have questions or need follow up information about today's clinic visit or your schedule please contact [facility] directly at [phone].  Normal or non-critical lab and imaging results will be communicated to you by [employer], letter or phone within 4 business days after the clinic has received the results. If you do not hear from us within 7 days, please contact the clinic through [employer] or phone. If you have a critical or abnormal lab result, we will notify you by phone as soon as possible.  Submit refill requests through [employer] system or call your pharmacy and they will forward the refill request to us. Please allow 3 business days for your refill to be completed.          Additional Information About Your Visit        [employer] system Information     [employer] system gives  you secure access to your electronic health record. If you see a primary care provider, you can also send messages to your care team and make appointments. If you have questions, please call your primary care clinic.  If you do not have a primary care provider, please call [phone] and they will assist you.        Care EveryWhere ID     This is your Care EveryWhere ID. This could be used by other organizations to access your [place] medical records  [id number]        Your Vitals Were     Pulse Temperature Pulse Oximetry BMI (Body Mass Index)          74 97.1  F (36.2  C) (Oral) 96% 33.6 kg/m2         Blood Pressure from Last 3 Encounters:   12/14/17 148/80   11/03/17 130/66   08/11/17 122/78    Weight from Last 3 Encounters:   12/14/17 147 lb 3.2 oz (66.8 kg)   11/03/17 151 lb (68.5 kg)   08/11/17 154 lb 3.2 oz (69.9 kg)              Today, you had the following     No orders found for display         Today's Medication Changes          These changes are accurate as of: 12/14/17  4:42 PM.  If you have any questions, ask your nurse or doctor.               These medicines have changed or have updated prescriptions.        Dose/Directions    ACETAMINOPHEN PO   This may have changed:  Another medication with the same name was removed. Continue taking this medication, and follow the directions you see here.   Changed by:  Violetta Martinez MD        Dose:  500 mg   Take 500 mg by mouth every 6 hours as needed for pain   Refills:  0       amLODIPine 5 MG tablet   Commonly known as:  NORVASC   This may have changed:  when to take this   Used for:  Hypertension goal BP (blood pressure) < 140/90   Changed by:  Manuel Taylor MD        Dose:  5 mg   Take 1 tablet (5 mg) by mouth daily   Quantity:  90 tablet   Refills:  3       atorvastatin 20 MG tablet   Commonly known as:  LIPITOR   This may have changed:  when to take this   Used for:  Hyperlipidemia LDL goal <100   Changed by:  Manuel Taylor MD        Dose:  20 mg    Take 1 tablet (20 mg) by mouth daily   Quantity:  90 tablet   Refills:  3       donepezil 10 MG tablet   Commonly known as:  ARICEPT   This may have changed:    - medication strength  - how much to take   Used for:  Alzheimer's dementia without behavioral disturbance, unspecified timing of dementia onset   Changed by:  Manuel Taylor MD        Dose:  10 mg   Take 1 tablet (10 mg) by mouth At Bedtime   Quantity:  90 tablet   Refills:  3       enalapril 20 MG tablet   Commonly known as:  VASOTEC   This may have changed:    - medication strength  - when to take this   Used for:  Hypertension goal BP (blood pressure) < 140/90   Changed by:  Manuel Taylor MD        Dose:  20 mg   Take 1 tablet (20 mg) by mouth daily   Quantity:  90 tablet   Refills:  3       Levothyroxine Sodium 75 MCG Caps   This may have changed:  medication strength   Used for:  Acquired hypothyroidism   Changed by:  Manuel Taylor MD        Dose:  75 mcg   Take 0.075 mg by mouth daily   Quantity:  90 capsule   Refills:  3       oxybutynin 10 MG 24 hr tablet   Commonly known as:  DITROPAN-XL   This may have changed:  See the new instructions.   Used for:  OAB (overactive bladder)   Changed by:  Manuel Taylor MD        TAKE TWO TABLETS BY MOUTH DAILY.   Quantity:  180 tablet   Refills:  3         Stop taking these medicines if you haven't already. Please contact your care team if you have questions.     piroxicam 20 MG capsule   Commonly known as:  FELDENE   Stopped by:  Manuel Taylor MD                Where to get your medicines      These medications were sent to [id number] - [place], [place] - [address] [place], [place] [address] [address]     Phone:  [phone]     amLODIPine 5 MG tablet    aspirin 81 MG tablet    atorvastatin 20 MG tablet    donepezil 10 MG tablet    enalapril 20 MG tablet    Levothyroxine Sodium 75 MCG Caps    oxybutynin 10 MG 24 hr tablet    warfarin 5 MG tablet                Primary Care Provider Office  Phone # Fax #    Manuel Taylor [phone] [address] [facility] [address]        Equal Access to Services     CHARLIESHIRLEY LO : Devin alicia royal lakshmio Sojennifer, waaxda luqadaha, qaybta kaalmada elijah, ro servin laAleidafarshad villalba. So [facility] [phone].    ATENCIÓN: Si habla español, tiene a akhtar disposición servicios gratuitos de asistencia lingüística. Llame al [phone].    We comply with applicable federal civil rights laws and [place] laws. We do not discriminate on the basis of race, color, national origin, age, disability, sex, sexual orientation, or gender identity.            Thank you!     Thank you for choosing [facility]  for your care. Our goal is always to provide you with excellent care. Hearing back from our patients is one way we can continue to improve our services. Please take a few minutes to complete the written survey that you may receive in the mail after your visit with us. Thank you!             Your Updated Medication List - Protect others around you: Learn how to safely use, store and throw away your medicines at www.disposemymeds.org.          This list is accurate as of: 12/14/17  4:42 PM.  Always use your most recent med list.                   Brand Name Dispense Instructions for use Diagnosis    ACETAMINOPHEN PO      Take 500 mg by mouth every 6 hours as needed for pain        amLODIPine 5 MG tablet    NORVASC    90 tablet    Take 1 tablet (5 mg) by mouth daily    Hypertension goal BP (blood pressure) < 140/90       aspirin 81 MG tablet     90 tablet    Take 1 tablet (81 mg) by mouth daily    Deep vein thrombosis (DVT) of proximal lower extremity, unspecified chronicity, unspecified laterality (H), Other chronic pulmonary embolism without acute cor pulmonale (H)       atorvastatin 20 MG tablet    LIPITOR    90 tablet    Take 1 tablet (20 mg) by mouth daily    Hyperlipidemia LDL goal <100       cholecalciferol 1000 UNIT  tablet    vitamin D3    90 tablet    Take 1 tablet (1,000 Units) by mouth daily    Hypovitaminosis D       donepezil 10 MG tablet    ARICEPT    90 tablet    Take 1 tablet (10 mg) by mouth At Bedtime    Alzheimer's dementia without behavioral disturbance, unspecified timing of dementia onset       enalapril 20 MG tablet    VASOTEC    90 tablet    Take 1 tablet (20 mg) by mouth daily    Hypertension goal BP (blood pressure) < 140/90       Levothyroxine Sodium 75 MCG Caps     90 capsule    Take 0.075 mg by mouth daily    Acquired hypothyroidism       order for DME     1 Device    Equipment being ordered: wall bars with screws, no suction        oxybutynin 10 MG 24 hr tablet    DITROPAN-XL    180 tablet    TAKE TWO TABLETS BY MOUTH DAILY.    OAB (overactive bladder)       polyethylene glycol powder    MIRALAX    510 g    Take 17 g (1 capful) by mouth daily    Chronic idiopathic constipation       warfarin 5 MG tablet    COUMADIN    90 tablet    Take 1 tablet (5 mg) by mouth daily    Deep vein thrombosis (DVT) of proximal lower extremity, unspecified chronicity, unspecified laterality (H), Other chronic pulmonary embolism without acute cor pulmonale (H)

## 2017-12-15 ENCOUNTER — TELEPHONE (OUTPATIENT)
Dept: INTERNAL MEDICINE | Facility: CLINIC | Age: 77
End: 2017-12-15

## 2017-12-15 ENCOUNTER — ANTICOAGULATION THERAPY VISIT (OUTPATIENT)
Dept: NURSING | Facility: CLINIC | Age: 77
End: 2017-12-15
Payer: COMMERCIAL

## 2017-12-15 DIAGNOSIS — Z79.01 LONG-TERM (CURRENT) USE OF ANTICOAGULANTS: ICD-10-CM

## 2017-12-15 DIAGNOSIS — I26.99 PULMONARY EMBOLISM WITH INFARCTION (H): ICD-10-CM

## 2017-12-15 DIAGNOSIS — I82.409 DVT (DEEP VENOUS THROMBOSIS) (H): ICD-10-CM

## 2017-12-15 LAB — INR PPP: 2.2

## 2017-12-15 NOTE — PROGRESS NOTES
ANTICOAGULATION FOLLOW-UP CLINIC VISIT    Patient Name:  Tana Caceres  Date:  12/15/2017  Contact Type:  Telephone/ Talisha [phone]    SUBJECTIVE:     Patient Findings     Positives No Problem Findings    Comments S/O:  Talisha from  home care calls with patients INR today of 2.2.  Tana Caceres is on Coumadin for DVT.  Current Coumadin dose is 5 mg daily.   Concerns today are: None Noted.    A/P: Tana Caceres's INR is Therapeutic  for his/her goal range of 2 - 3.  Reasons why INR is out of range may include: na  Recommended to have Tana Caceres remain on the same Coumadin dose and to have his/her INR rechecked in 2 weeks on 12/28.      Marina Mohr RN BC             OBJECTIVE    INR   Date Value Ref Range Status   12/15/2017 2.2  Final       ASSESSMENT / PLAN  No question data found.  Anticoagulation Summary as of 12/15/2017     INR goal 2.0-3.0   Today's INR 2.2   Maintenance plan 5 mg (5 mg x 1) every day   Full instructions 5 mg every day   Weekly total 35 mg   No change documented Marina Mohr RN   Plan last modified Marina Mohr RN (9/22/2017)   Next INR check 12/28/2017   Target end date Indefinite    Indications   DVT (deep venous thrombosis) (H) [I82.409]  Long-term (current) use of anticoagulants [Z79.01] [Z79.01]  Pulmonary embolism with infarction (H) [I26.99] [I26.99]         Anticoagulation Episode Summary     INR check location     Preferred lab     Send INR reminders to [facility]    Comments       Anticoagulation Care Providers     Provider Role Specialty Phone number    Manuel Taylor MD [facility] Internal Medicine [phone]            See the Encounter Report to view Anticoagulation Flowsheet and Dosing Calendar (Go to Encounters tab in chart review, and find the Anticoagulation Therapy Visit)    Dosage adjustment made based on physician directed care plan.    Marina Mohr RN

## 2017-12-15 NOTE — TELEPHONE ENCOUNTER
Reason for Call: Request for an order or referral:    Order or referral being requested: skilled nursing every other week for 9 weeks and 2 PRN for med compliance and lab draws(INR)    Date needed: as soon as possible    Has the patient been seen by the PCP for this problem? Not Applicable    Additional comments: verbal ok    Phone number Patient can be reached at:  Other phone number:  [phone]    Best Time:  Any time    Can we leave a detailed message on this number?  YES    Call taken on 12/15/2017 at 2:19 PM by Katelin Sher

## 2017-12-15 NOTE — TELEPHONE ENCOUNTER
Spoke with Talisha. Verbal OK for orders requested. Talisha verbalized understanding.   Leela Lowe RN

## 2017-12-15 NOTE — MR AVS SNAPSHOT
Tana Nicki   12/15/2017   Anticoagulation Therapy Visit    Description:  77 year old female   Provider:  Manuel Taylor MD   Department:  [facility]           INR as of 12/15/2017     Today's INR 2.2      Anticoagulation Summary as of 12/15/2017     INR goal 2.0-3.0   Today's INR 2.2   Full instructions 5 mg every day   Next INR check 12/28/2017    Indications   DVT (deep venous thrombosis) (H) [I82.409]  Long-term (current) use of anticoagulants [Z79.01] [Z79.01]  Pulmonary embolism with infarction (H) [I26.99] [I26.99]         Contact Numbers     [facility]  Please call [phone] to cancel and/or reschedule your appointment   Please call [phone] with any problems or questions regarding your therapy.        December 2017 Details    Sun Mon Tue Wed Thu Fri Sat          1               2                 3               4               5               6               7               8               9                 10               11               12               13               14               15      5 mg   See details      16      5 mg           17      5 mg         18      5 mg         19      5 mg         20      5 mg         21      5 mg         22      5 mg         23      5 mg           24      5 mg         25      5 mg         26      5 mg         27      5 mg         28            29               30                 31                      Date Details   12/15 This INR check       Date of next INR:  12/28/2017         How to take your warfarin dose     To take:  5 mg Take 1 of the 5 mg tablets.

## 2017-12-28 ENCOUNTER — ANTICOAGULATION THERAPY VISIT (OUTPATIENT)
Dept: NURSING | Facility: CLINIC | Age: 77
End: 2017-12-28
Payer: COMMERCIAL

## 2017-12-28 DIAGNOSIS — I26.99 PULMONARY EMBOLISM WITH INFARCTION (H): ICD-10-CM

## 2017-12-28 DIAGNOSIS — Z79.01 LONG-TERM (CURRENT) USE OF ANTICOAGULANTS: ICD-10-CM

## 2017-12-28 DIAGNOSIS — I82.409 DVT (DEEP VENOUS THROMBOSIS) (H): ICD-10-CM

## 2017-12-28 LAB — INR PPP: 2.4

## 2017-12-28 NOTE — MR AVS SNAPSHOT
Tana Ramosherb   12/28/2017   Anticoagulation Therapy Visit    Description:  77 year old female   Provider:  Manuel Taylor MD   Department:  [facility]           INR as of 12/28/2017     Today's INR 2.4      Anticoagulation Summary as of 12/28/2017     INR goal 2.0-3.0   Today's INR 2.4   Full instructions 5 mg every day   Next INR check 1/11/2018    Indications   DVT (deep venous thrombosis) (H) [I82.409]  Long-term (current) use of anticoagulants [Z79.01] [Z79.01]  Pulmonary embolism with infarction (H) [I26.99] [I26.99]         Contact Numbers     [facility]  Please call [phone] to cancel and/or reschedule your appointment   Please call [phone] with any problems or questions regarding your therapy.        December 2017 Details    Sun Mon Tue Wed Thu Fri Sat          1               2                 3               4               5               6               7               8               9                 10               11               12               13               14               15               16                 17               18               19               20               21               22               23                 24               25               26               27               28      5 mg   See details      29      5 mg         30      5 mg           31      5 mg                Date Details   12/28 This INR check               How to take your warfarin dose     To take:  5 mg Take 1 of the 5 mg tablets.           January 2018 Details    Sun Mon Tue Wed Thu Fri Sat      1      5 mg         2      5 mg         3      5 mg         4      5 mg         5      5 mg         6      5 mg           7      5 mg         8      5 mg         9      5 mg         10      5 mg         11            12               13                 14               15               16               17               18               19               20                 21               22                23               24               25               26               27                 28               29               30               31                   Date Details   No additional details    Date of next INR:  1/11/2018         How to take your warfarin dose     To take:  5 mg Take 1 of the 5 mg tablets.

## 2017-12-28 NOTE — PROGRESS NOTES
ANTICOAGULATION FOLLOW-UP CLINIC VISIT    Patient Name:  Tana Caceres  Date:  12/28/2017  Contact Type:  Telephone/ Rupal [phone]    SUBJECTIVE:     Patient Findings     Positives No Problem Findings    Comments S/O:  Rupal from  home care calls with patients INR today of 2.4.  Tana Caceres is on Coumadin for DVT.  Current Coumadin dose is 5 mg daily.   Concerns today are: None Noted.    A/P: Tana Caceres's INR is Therapeutic  for his/her goal range of 2 - 3.  Reasons why INR is out of range may include: na  Recommended to have Tana Caceres remain on the same Coumadin dose and to have his/her INR rechecked in 2 weeks on 1/11/18.      Marina Mohr RN BC             OBJECTIVE    INR   Date Value Ref Range Status   12/28/2017 2.4  Final       ASSESSMENT / PLAN  No question data found.  Anticoagulation Summary as of 12/28/2017     INR goal 2.0-3.0   Today's INR 2.4   Maintenance plan 5 mg (5 mg x 1) every day   Full instructions 5 mg every day   Weekly total 35 mg   No change documented Marina Mohr RN   Plan last modified Marina Mohr RN (9/22/2017)   Next INR check 1/11/2018   Target end date Indefinite    Indications   DVT (deep venous thrombosis) (H) [I82.409]  Long-term (current) use of anticoagulants [Z79.01] [Z79.01]  Pulmonary embolism with infarction (H) [I26.99] [I26.99]         Anticoagulation Episode Summary     INR check location     Preferred lab     Send INR reminders to [facility] CL[facility]    Comments       Anticoagulation Care Providers     Provider Role Specialty Phone number    Manuel Taylor MD [facility] Internal Medicine [phone]            See the Encounter Report to view Anticoagulation Flowsheet and Dosing Calendar (Go to Encounters tab in chart review, and find the Anticoagulation Therapy Visit)    Dosage adjustment made based on physician directed care plan.    Marina Mohr RN

## 2017-12-30 DIAGNOSIS — E55.9 HYPOVITAMINOSIS D: ICD-10-CM

## 2017-12-30 DIAGNOSIS — I82.4Y9 DEEP VEIN THROMBOSIS (DVT) OF PROXIMAL LOWER EXTREMITY, UNSPECIFIED CHRONICITY, UNSPECIFIED LATERALITY (H): ICD-10-CM

## 2017-12-30 DIAGNOSIS — I27.82 OTHER CHRONIC PULMONARY EMBOLISM WITHOUT ACUTE COR PULMONALE (H): ICD-10-CM

## 2018-01-02 RX ORDER — WARFARIN SODIUM 5 MG/1
TABLET ORAL
Qty: 30 TABLET | Refills: 1 | OUTPATIENT
Start: 2018-01-02

## 2018-01-02 RX ORDER — CHOLECALCIFEROL (VITAMIN D3) 25 MCG
TABLET ORAL
Qty: 30 TABLET | Refills: 3 | Status: SHIPPED | OUTPATIENT
Start: 2018-01-02 | End: 2018-05-03

## 2018-01-02 NOTE — TELEPHONE ENCOUNTER
Prescription approved per [facility] Refill Protocol.  Marianne Lockhart RN

## 2018-01-11 ENCOUNTER — ANTICOAGULATION THERAPY VISIT (OUTPATIENT)
Dept: NURSING | Facility: CLINIC | Age: 78
End: 2018-01-11

## 2018-01-11 DIAGNOSIS — I26.99 PULMONARY EMBOLISM WITH INFARCTION (H): ICD-10-CM

## 2018-01-11 DIAGNOSIS — I82.409 DVT (DEEP VENOUS THROMBOSIS) (H): ICD-10-CM

## 2018-01-11 DIAGNOSIS — Z79.01 LONG-TERM (CURRENT) USE OF ANTICOAGULANTS: ICD-10-CM

## 2018-01-11 LAB — INR PPP: 2.1

## 2018-01-11 NOTE — MR AVS SNAPSHOT
Tana Ramosherb   1/11/2018   Anticoagulation Therapy Visit    Description:  77 year old female   Provider:  Manuel Taylor MD   Department:  [facility]           INR as of 1/11/2018     Today's INR 2.1      Anticoagulation Summary as of 1/11/2018     INR goal 2.0-3.0   Today's INR 2.1   Full instructions 5 mg every day   Next INR check 1/25/2018    Indications   DVT (deep venous thrombosis) (H) [I82.409]  Long-term (current) use of anticoagulants [Z79.01] [Z79.01]  Pulmonary embolism with infarction (H) [I26.99] [I26.99]         Contact Numbers     [facility]  Please call [phone] to cancel and/or reschedule your appointment   Please call [phone] with any problems or questions regarding your therapy.        January 2018 Details    Sun Mon Tue Wed Thu Fri Sat      1               2               3               4               5               6                 7               8               9               10               11      5 mg   See details      12      5 mg         13      5 mg           14      5 mg         15      5 mg         16      5 mg         17      5 mg         18      5 mg         19      5 mg         20      5 mg           21      5 mg         22      5 mg         23      5 mg         24      5 mg         25            26               27                 28               29               30               31                   Date Details   01/11 This INR check       Date of next INR:  1/25/2018         How to take your warfarin dose     To take:  5 mg Take 1 of the 5 mg tablets.

## 2018-01-11 NOTE — PROGRESS NOTES
ANTICOAGULATION FOLLOW-UP CLINIC VISIT    Patient Name:  Tana Caceres  Date:  1/11/2018  Contact Type:  Telephone/ Rupal    SUBJECTIVE:     Patient Findings     Positives No Problem Findings    Comments S/O:  Rupal from  home care calls with patients INR today of 2.1.  Tana Caceres is on Coumadin for DVT.  Current Coumadin dose is 5 mg daily.   Concerns today are: None Noted.    A/P: Tana Caecres's INR is Therapeutic  for his/her goal range of 2 - 3.  Reasons why INR is out of range may include: na  Recommended to have Tana Caceres remain on the same Coumadin dose and to have his/her INR rechecked in 2 weeks on 1/25.      Marina Mohr RN BC             OBJECTIVE    INR   Date Value Ref Range Status   01/11/2018 2.1  Final       ASSESSMENT / PLAN  No question data found.  Anticoagulation Summary as of 1/11/2018     INR goal 2.0-3.0   Today's INR 2.1   Maintenance plan 5 mg (5 mg x 1) every day   Full instructions 5 mg every day   Weekly total 35 mg   No change documented Marina Mohr RN   Plan last modified Marina Mohr RN (9/22/2017)   Next INR check 1/25/2018   Target end date Indefinite    Indications   DVT (deep venous thrombosis) (H) [I82.409]  Long-term (current) use of anticoagulants [Z79.01] [Z79.01]  Pulmonary embolism with infarction (H) [I26.99] [I26.99]         Anticoagulation Episode Summary     INR check location     Preferred lab     Send INR reminders to [facility]    Comments       Anticoagulation Care Providers     Provider Role Specialty Phone number    Manuel Taylor MD [facility] Internal Medicine [phone]            See the Encounter Report to view Anticoagulation Flowsheet and Dosing Calendar (Go to Encounters tab in chart review, and find the Anticoagulation Therapy Visit)    Dosage adjustment made based on physician directed care plan.    Marina Mohr RN

## 2018-01-25 ENCOUNTER — TELEPHONE (OUTPATIENT)
Dept: NURSING | Facility: CLINIC | Age: 78
End: 2018-01-25

## 2018-01-25 ENCOUNTER — ANTICOAGULATION THERAPY VISIT (OUTPATIENT)
Dept: NURSING | Facility: CLINIC | Age: 78
End: 2018-01-25

## 2018-01-25 ENCOUNTER — MEDICAL CORRESPONDENCE (OUTPATIENT)
Dept: HEALTH INFORMATION MANAGEMENT | Facility: CLINIC | Age: 78
End: 2018-01-25

## 2018-01-25 DIAGNOSIS — I82.409 DVT (DEEP VENOUS THROMBOSIS) (H): ICD-10-CM

## 2018-01-25 DIAGNOSIS — I26.99 PULMONARY EMBOLISM WITH INFARCTION (H): ICD-10-CM

## 2018-01-25 DIAGNOSIS — Z79.01 LONG-TERM (CURRENT) USE OF ANTICOAGULANTS: ICD-10-CM

## 2018-01-25 LAB — INR PPP: 2.3

## 2018-01-25 NOTE — TELEPHONE ENCOUNTER
Patient is interested in starting to do her own INR's through [employer]. Form filled out and placed in providers box for signature. Once signed needs to go to TC to fax.    Marina Mohr RN - BC

## 2018-01-25 NOTE — MR AVS SNAPSHOT
Tana Caceres   1/25/2018   Anticoagulation Therapy Visit    Description:  77 year old female   Provider:  Manuel Taylor MD   Department:  [facility]           INR as of 1/25/2018     Today's INR 2.3      Anticoagulation Summary as of 1/25/2018     INR goal 2.0-3.0   Today's INR 2.3   Full instructions 5 mg every day   Next INR check 2/8/2018    Indications   DVT (deep venous thrombosis) (H) [I82.409]  Long-term (current) use of anticoagulants [Z79.01] [Z79.01]  Pulmonary embolism with infarction (H) [I26.99] [I26.99]         Contact Numbers     [facility]  Please call [phone] to cancel and/or reschedule your appointment   Please call [phone] with any problems or questions regarding your therapy.        January 2018 Details    Sun Mon Tue Wed Thu Fri Sat      1               2               3               4               5               6                 7               8               9               10               11               12               13                 14               15               16               17               18               19               20                 21               22               23               24               25      5 mg   See details      26      5 mg         27      5 mg           28      5 mg         29      5 mg         30      5 mg         31      5 mg             Date Details   01/25 This INR check               How to take your warfarin dose     To take:  5 mg Take 1 of the 5 mg tablets.           February 2018 Details    Sun Mon Tue Wed Thu Fri Sat         1      5 mg         2      5 mg         3      5 mg           4      5 mg         5      5 mg         6      5 mg         7      5 mg         8            9               10                 11               12               13               14               15               16               17                 18               19               20               21               22                23               24                 25               26               27               28                   Date Details   No additional details    Date of next INR:  2/8/2018         How to take your warfarin dose     To take:  5 mg Take 1 of the 5 mg tablets.

## 2018-01-25 NOTE — PROGRESS NOTES
ANTICOAGULATION FOLLOW-UP CLINIC VISIT    Patient Name:  Tana Caceres  Date:  1/25/2018  Contact Type:  Telephone/ Rupal [phone]    SUBJECTIVE:     Patient Findings     Positives No Problem Findings    Comments S/O:  Rupal from  home care calls with patients INR today of 2.3.  Tana Caceres is on Coumadin for DVT.  Current Coumadin dose is 5 mg daily.   Concerns today are: None Noted.    A/P: Tana Caceres's INR is Therapeutic  for his/her goal range of 2 - 3.  Reasons why INR is out of range may include: na  Recommended to have Tana Caceres remain on the same Coumadin dose and to have his/her INR rechecked in 2 weeks on 2/8.      Marina Mohr RN BC             OBJECTIVE    INR   Date Value Ref Range Status   01/25/2018 2.3  Final       ASSESSMENT / PLAN  No question data found.  Anticoagulation Summary as of 1/25/2018     INR goal 2.0-3.0   Today's INR 2.3   Maintenance plan 5 mg (5 mg x 1) every day   Full instructions 5 mg every day   Weekly total 35 mg   No change documented Marina Mohr RN   Plan last modified Marina Mohr RN (9/22/2017)   Next INR check 2/8/2018   Target end date Indefinite    Indications   DVT (deep venous thrombosis) (H) [I82.409]  Long-term (current) use of anticoagulants [Z79.01] [Z79.01]  Pulmonary embolism with infarction (H) [I26.99] [I26.99]         Anticoagulation Episode Summary     INR check location     Preferred lab     Send INR reminders to [facility]    Comments       Anticoagulation Care Providers     Provider Role Specialty Phone number    Manuel Taylor MD [facility] Internal Medicine [phone]            See the Encounter Report to view Anticoagulation Flowsheet and Dosing Calendar (Go to Encounters tab in chart review, and find the Anticoagulation Therapy Visit)    Dosage adjustment made based on physician directed care plan.    Marina Mohr RN

## 2018-01-29 NOTE — TELEPHONE ENCOUNTER
Received a call from Yi with [employer] stating that they have reached out to the pt and is working on the verification process at this time. Will update once everything goes through.    Jazmine Moreno RN  [facility]

## 2018-02-08 ENCOUNTER — ANTICOAGULATION THERAPY VISIT (OUTPATIENT)
Dept: NURSING | Facility: CLINIC | Age: 78
End: 2018-02-08

## 2018-02-08 ENCOUNTER — TELEPHONE (OUTPATIENT)
Dept: NURSING | Facility: CLINIC | Age: 78
End: 2018-02-08

## 2018-02-08 DIAGNOSIS — I26.99 PULMONARY EMBOLISM WITH INFARCTION (H): ICD-10-CM

## 2018-02-08 DIAGNOSIS — I26.99 PULMONARY EMBOLISM WITH INFARCTION (H): Primary | ICD-10-CM

## 2018-02-08 DIAGNOSIS — I82.409 DVT (DEEP VENOUS THROMBOSIS) (H): ICD-10-CM

## 2018-02-08 DIAGNOSIS — Z79.01 LONG-TERM (CURRENT) USE OF ANTICOAGULANTS: ICD-10-CM

## 2018-02-08 LAB — INR PPP: 2.5

## 2018-02-08 NOTE — PROGRESS NOTES
ANTICOAGULATION FOLLOW-UP CLINIC VISIT    Patient Name:  Tana Caceres  Date:  2/8/2018  Contact Type:  Telephone/ Rupal    SUBJECTIVE:     Patient Findings     Positives No Problem Findings    Comments S/O:  Rupal from  home care calls with patients INR today of 2.5.  Tana Caceres is on Coumadin for DVT.  Current Coumadin dose is 5 mg daily.   Concerns today are: None Noted.    A/P: Tana Caceres's INR is Therapeutic  for his/her goal range of 2 - 3.  Reasons why INR is out of range may include: na  Recommended to have Tana Caceres remain on the same Coumadin dose and to have his/her INR rechecked in 2 weeks on 2/22.      Marina Mohr RN BC             OBJECTIVE    INR   Date Value Ref Range Status   02/08/2018 2.5  Final       ASSESSMENT / PLAN  No question data found.  Anticoagulation Summary as of 2/8/2018     INR goal 2.0-3.0   Today's INR 2.5   Maintenance plan 5 mg (5 mg x 1) every day   Full instructions 5 mg every day   Weekly total 35 mg   Plan last modified Marina Mohr RN (9/22/2017)   Next INR check 2/22/2018   Target end date Indefinite    Indications   DVT (deep venous thrombosis) (H) [I82.409]  Long-term (current) use of anticoagulants [Z79.01] [Z79.01]  Pulmonary embolism with infarction (H) [I26.99] [I26.99]         Anticoagulation Episode Summary     INR check location     Preferred lab     Send INR reminders to [facility]    Comments       Anticoagulation Care Providers     Provider Role Specialty Phone number    Manuel Taylor MD [facility] Internal Medicine [phone]            See the Encounter Report to view Anticoagulation Flowsheet and Dosing Calendar (Go to Encounters tab in chart review, and find the Anticoagulation Therapy Visit)    Dosage adjustment made based on physician directed care plan.    Marina Mohr, IKE

## 2018-02-08 NOTE — MR AVS SNAPSHOT
Tana Ramosherb   2/8/2018   Anticoagulation Therapy Visit    Description:  77 year old female   Provider:  Manuel Taylor MD   Department:  [facility] Nurse           INR as of 2/8/2018     Today's INR 2.5      Anticoagulation Summary as of 2/8/2018     INR goal 2.0-3.0   Today's INR 2.5   Full instructions 5 mg every day   Next INR check 2/22/2018    Indications   DVT (deep venous thrombosis) (H) [I82.409]  Long-term (current) use of anticoagulants [Z79.01] [Z79.01]  Pulmonary embolism with infarction (H) [I26.99] [I26.99]         Contact Numbers     [facility]  Please call [phone] to cancel and/or reschedule your appointment   Please call [phone] with any problems or questions regarding your therapy.        February 2018 Details    Sun Mon Tue Wed Thu Fri Sat         1               2               3                 4               5               6               7               8      5 mg   See details      9      5 mg         10      5 mg           11      5 mg         12      5 mg         13      5 mg         14      5 mg         15      5 mg         16      5 mg         17      5 mg           18      5 mg         19      5 mg         20      5 mg         21      5 mg         22            23               24                 25               26               27               28                   Date Details   02/08 This INR check       Date of next INR:  2/22/2018         How to take your warfarin dose     To take:  5 mg Take 1 of the 5 mg tablets.

## 2018-02-08 NOTE — TELEPHONE ENCOUNTER
Annual INR referral needed.  Orders teed up.    Marina Mohr RN - [facility]

## 2018-02-22 ENCOUNTER — ANTICOAGULATION THERAPY VISIT (OUTPATIENT)
Dept: NURSING | Facility: CLINIC | Age: 78
End: 2018-02-22

## 2018-02-22 DIAGNOSIS — I26.99 PULMONARY EMBOLISM WITH INFARCTION (H): ICD-10-CM

## 2018-02-22 DIAGNOSIS — I82.409 DVT (DEEP VENOUS THROMBOSIS) (H): ICD-10-CM

## 2018-02-22 DIAGNOSIS — Z79.01 LONG-TERM (CURRENT) USE OF ANTICOAGULANTS: ICD-10-CM

## 2018-02-22 LAB — INR PPP: 2.2

## 2018-02-22 NOTE — MR AVS SNAPSHOT
Tana Caceres   2/22/2018   Anticoagulation Therapy Visit    Description:  77 year old female   Provider:  Manuel Taylor MD   Department:  [facility] Nurse           INR as of 2/22/2018     Today's INR 2.2      Anticoagulation Summary as of 2/22/2018     INR goal 2.0-3.0   Today's INR 2.2   Full instructions 5 mg every day   Next INR check 3/8/2018    Indications   DVT (deep venous thrombosis) (H) [I82.409]  Long-term (current) use of anticoagulants [Z79.01] [Z79.01]  Pulmonary embolism with infarction (H) [I26.99] [I26.99]         Contact Numbers     [facility]  Please call [phone] to cancel and/or reschedule your appointment   Please call [phone] with any problems or questions regarding your therapy.        February 2018 Details    Sun Mon Tue Wed Thu Fri Sat         1               2               3                 4               5               6               7               8               9               10                 11               12               13               14               15               16               17                 18               19               20               21               22      5 mg   See details      23      5 mg         24      5 mg           25      5 mg         26      5 mg         27      5 mg         28      5 mg             Date Details   02/22 This INR check               How to take your warfarin dose     To take:  5 mg Take 1 of the 5 mg tablets.           March 2018 Details    Sun Mon Tue Wed Thu Fri Sat         1      5 mg         2      5 mg         3      5 mg           4      5 mg         5      5 mg         6      5 mg         7      5 mg         8            9               10                 11               12               13               14               15               16               17                 18               19               20               21               22               23               24                 25                26               27               28               29               30               31                Date Details   No additional details    Date of next INR:  3/8/2018         How to take your warfarin dose     To take:  5 mg Take 1 of the 5 mg tablets.

## 2018-02-22 NOTE — PROGRESS NOTES
ANTICOAGULATION FOLLOW-UP CLINIC VISIT    Patient Name:  Tana Caceres  Date:  2/22/2018  Contact Type:  Telephone/ Rupal    SUBJECTIVE:     Patient Findings     Positives No Problem Findings    Comments S/O:  Rupal from  home care calls with patients INR today of 2.2.  Tana Caceres is on Coumadin for DVT.  Current Coumadin dose is 5 mg daily.   Concerns today are: None Noted.    A/P: Tana Caceres's INR is Therapeutic  for his/her goal range of 2 - 3.  Reasons why INR is out of range may include: na  Recommended to have Tana Caceres remain on the same Coumadin dose and to have his/her INR rechecked in 2 weeks on 3/8.      Marina Mohr RN BC             OBJECTIVE    INR   Date Value Ref Range Status   02/22/2018 2.2  Final       ASSESSMENT / PLAN  No question data found.  Anticoagulation Summary as of 2/22/2018     INR goal 2.0-3.0   Today's INR 2.2   Maintenance plan 5 mg (5 mg x 1) every day   Full instructions 5 mg every day   Weekly total 35 mg   No change documented Marina Mohr RN   Plan last modified Marina Mohr RN (9/22/2017)   Next INR check 3/8/2018   Target end date Indefinite    Indications   DVT (deep venous thrombosis) (H) [I82.409]  Long-term (current) use of anticoagulants [Z79.01] [Z79.01]  Pulmonary embolism with infarction (H) [I26.99] [I26.99]         Anticoagulation Episode Summary     INR check location     Preferred lab     Send INR reminders to [facility]    Comments       Anticoagulation Care Providers     Provider Role Specialty Phone number    Manuel Taylor MD [facility] Internal Medicine [phone]            See the Encounter Report to view Anticoagulation Flowsheet and Dosing Calendar (Go to Encounters tab in chart review, and find the Anticoagulation Therapy Visit)    Dosage adjustment made based on physician directed care plan.    Marina Mohr RN

## 2018-03-01 PROCEDURE — G0179 MD RECERTIFICATION HHA PT: HCPCS | Performed by: INTERNAL MEDICINE

## 2018-03-06 ENCOUNTER — TELEPHONE (OUTPATIENT)
Dept: FAMILY MEDICINE | Facility: CLINIC | Age: 78
End: 2018-03-06

## 2018-03-06 NOTE — TELEPHONE ENCOUNTER
Yi from [facility] called and states that she received the medical records for pt. [facility] does not service her policy. This information will be given to pt from [facility]. Yi does not have other home monitoring options for pt. Routing as FYI.    Jazmine Moreno RN  [facility]

## 2018-03-08 ENCOUNTER — ANTICOAGULATION THERAPY VISIT (OUTPATIENT)
Dept: LAB | Facility: CLINIC | Age: 78
End: 2018-03-08
Payer: COMMERCIAL

## 2018-03-08 ENCOUNTER — TELEPHONE (OUTPATIENT)
Dept: NURSING | Facility: CLINIC | Age: 78
End: 2018-03-08

## 2018-03-08 DIAGNOSIS — Z79.01 LONG-TERM (CURRENT) USE OF ANTICOAGULANTS: ICD-10-CM

## 2018-03-08 DIAGNOSIS — I82.409 DVT (DEEP VENOUS THROMBOSIS) (H): ICD-10-CM

## 2018-03-08 DIAGNOSIS — I26.99 PULMONARY EMBOLISM WITH INFARCTION (H): ICD-10-CM

## 2018-03-08 LAB — INR PPP: 2

## 2018-03-08 PROCEDURE — 99207 ZZC NO CHARGE NURSE ONLY: CPT | Performed by: INTERNAL MEDICINE

## 2018-03-08 NOTE — Clinical Note
Patient did not qualify through [employer] for home INR monitoring. I put paperwork in your box to sign to try to go through [employer] for her. Also, she is wanting to do home monitoring because [place] and is wanting to monitor while gone. Are you okay with monitoring patient while she is out of the country?  Marina Mohr, IKE - BC

## 2018-03-08 NOTE — TELEPHONE ENCOUNTER
[employer] is not covered by patient's insurance. Unable to do home monitoring through them. Filled paperwork out for [employer] to see if patient qualifies for home monitoring through them. Paperwork placed in Dr. Taylor's box for signature. Once signed TC will need to fax to Della Mohr RN - BC

## 2018-03-08 NOTE — MR AVS SNAPSHOT
Tana Ramosherb   3/8/2018   Anticoagulation Therapy Visit    Description:  77 year old female   Provider:  Manuel Taylor MD   Department:  [facility]           INR as of 3/8/2018     Today's INR 2.0      Anticoagulation Summary as of 3/8/2018     INR goal 2.0-3.0   Today's INR 2.0   Full instructions 5 mg every day   Next INR check 3/22/2018    Indications   DVT (deep venous thrombosis) (H) [I82.409]  Long-term (current) use of anticoagulants [Z79.01] [Z79.01]  Pulmonary embolism with infarction (H) [I26.99] [I26.99]         March 2018 Details    Sun Mon Tue Wed Thu Fri Sat         1               2               3                 4               5               6               7               8      5 mg   See details      9      5 mg         10      5 mg           11      5 mg         12      5 mg         13      5 mg         14      5 mg         15      5 mg         16      5 mg         17      5 mg           18      5 mg         19      5 mg         20      5 mg         21      5 mg         22            23               24                 25               26               27               28               29               30               31                Date Details   03/08 This INR check       Date of next INR:  3/22/2018         How to take your warfarin dose     To take:  5 mg Take 1 of the 5 mg tablets.

## 2018-03-08 NOTE — PROGRESS NOTES
ANTICOAGULATION FOLLOW-UP CLINIC VISIT    Patient Name:  Tana Caceres  Date:  3/8/2018  Contact Type:  Telephone/ Karine    SUBJECTIVE:     Patient Findings     Positives No Problem Findings    Comments S/O:  Karine from  home care calls with patients INR today of 2.0.  Tana Caceres is on Coumadin for DVT.  Current Coumadin dose is 5 mg daily.   Concerns today are: [employer] is not covered by patient's insurance. Unable to do home monitoring through them. Filled paperwork out for [employer] to see if patient qualifies for home monitoring through them.    A/P: Tana Caceres's INR is Therapeutic  for his/her goal range of 2 - 3.  Reasons why INR is out of range may include: na  Recommended to have Tana Caceres remain on the same Coumadin dose and to have his/her INR rechecked in 2 weeks on 3/22.      Marina Mohr RN BC             OBJECTIVE    INR   Date Value Ref Range Status   03/08/2018 2.0  Final       ASSESSMENT / PLAN  No question data found.  Anticoagulation Summary as of 3/8/2018     INR goal 2.0-3.0   Today's INR 2.0   Maintenance plan 5 mg (5 mg x 1) every day   Full instructions 5 mg every day   Weekly total 35 mg   No change documented Marina Mohr RN   Plan last modified Marina Mohr RN (9/22/2017)   Next INR check 3/22/2018   Target end date Indefinite    Indications   DVT (deep venous thrombosis) (H) [I82.409]  Long-term (current) use of anticoagulants [Z79.01] [Z79.01]  Pulmonary embolism with infarction (H) [I26.99] [I26.99]         Anticoagulation Episode Summary     INR check location     Preferred lab     Send INR reminders to [facility]    Comments       Anticoagulation Care Providers     Provider Role Specialty Phone number    Manuel Taylor MD [facility] Internal Medicine [phone]            See the Encounter Report to view Anticoagulation Flowsheet and Dosing Calendar (Go to Encounters tab in chart review, and find the Anticoagulation Therapy Visit)    Dosage adjustment made  based on physician directed care plan.    Marina Mohr RN

## 2018-03-22 ENCOUNTER — ANTICOAGULATION THERAPY VISIT (OUTPATIENT)
Dept: NURSING | Facility: CLINIC | Age: 78
End: 2018-03-22
Payer: COMMERCIAL

## 2018-03-22 DIAGNOSIS — Z79.01 LONG-TERM (CURRENT) USE OF ANTICOAGULANTS: ICD-10-CM

## 2018-03-22 DIAGNOSIS — I82.409 DVT (DEEP VENOUS THROMBOSIS) (H): ICD-10-CM

## 2018-03-22 DIAGNOSIS — I26.99 PULMONARY EMBOLISM WITH INFARCTION (H): ICD-10-CM

## 2018-03-22 LAB — INR PPP: 2.7

## 2018-03-22 NOTE — PROGRESS NOTES
ANTICOAGULATION FOLLOW-UP CLINIC VISIT    Patient Name:  Tana Caceres  Date:  3/22/2018  Contact Type:  Telephone    SUBJECTIVE:     Patient Findings     Positives No Problem Findings    Comments S/O:  Karine from  home care calls with patients INR today of 2.7.  Tana Caceres is on Coumadin for DVT.  Current Coumadin dose is 5 mg daily.   Concerns today are: None Noted.    A/P: Tana Caceres's INR is Therapeutic  for his/her goal range of 2 - 3.  Reasons why INR is out of range may include: na  Recommended to have Tana Caceres remain on the same Coumadin dose and to have his/her INR rechecked in 2 weeks on 4/5.      Marina Mohr RN BC             OBJECTIVE    INR   Date Value Ref Range Status   03/22/2018 2.7  Final       ASSESSMENT / PLAN  No question data found.  Anticoagulation Summary as of 3/22/2018     INR goal 2.0-3.0   Today's INR 2.7   Maintenance plan 5 mg (5 mg x 1) every day   Full instructions 5 mg every day   Weekly total 35 mg   No change documented Marina Mohr RN   Plan last modified Marina Mohr RN (9/22/2017)   Next INR check 4/5/2018   Target end date Indefinite    Indications   DVT (deep venous thrombosis) (H) [I82.409]  Long-term (current) use of anticoagulants [Z79.01] [Z79.01]  Pulmonary embolism with infarction (H) [I26.99] [I26.99]         Anticoagulation Episode Summary     INR check location     Preferred lab     Send INR reminders to [facility]    Comments       Anticoagulation Care Providers     Provider Role Specialty Phone number    Manuel Taylor MD [facility] Internal Medicine [phone]            See the Encounter Report to view Anticoagulation Flowsheet and Dosing Calendar (Go to Encounters tab in chart review, and find the Anticoagulation Therapy Visit)    Dosage adjustment made based on physician directed care plan.    Marina Mohr RN

## 2018-03-22 NOTE — MR AVS SNAPSHOT
Tana Caceres   3/22/2018   Anticoagulation Therapy Visit    Description:  77 year old female   Provider:  Manuel Taylor MD   Department:  [facility]           INR as of 3/22/2018     Today's INR 2.7      Anticoagulation Summary as of 3/22/2018     INR goal 2.0-3.0   Today's INR 2.7   Full instructions 5 mg every day   Next INR check 4/5/2018    Indications   DVT (deep venous thrombosis) (H) [I82.409]  Long-term (current) use of anticoagulants [Z79.01] [Z79.01]  Pulmonary embolism with infarction (H) [I26.99] [I26.99]         Contact Numbers     [facility]  Please call [phone] to cancel and/or reschedule your appointment   Please call [phone] with any problems or questions regarding your therapy.        March 2018 Details    Sun Mon Tue Wed Thu Fri Sat         1               2               3                 4               5               6               7               8               9               10                 11               12               13               14               15               16               17                 18               19               20               21               22      5 mg   See details      23      5 mg         24      5 mg           25      5 mg         26      5 mg         27      5 mg         28      5 mg         29      5 mg         30      5 mg         31      5 mg          Date Details   03/22 This INR check               How to take your warfarin dose     To take:  5 mg Take 1 of the 5 mg tablets.           April 2018 Details    Sun Mon Tue Wed Thu Fri Sat     1      5 mg         2      5 mg         3      5 mg         4      5 mg         5            6               7                 8               9               10               11               12               13               14                 15               16               17               18               19               20               21                 22               23                24               25               26               27               28                 29               30                     Date Details   No additional details    Date of next INR:  4/5/2018         How to take your warfarin dose     To take:  5 mg Take 1 of the 5 mg tablets.

## 2018-04-05 ENCOUNTER — ANTICOAGULATION THERAPY VISIT (OUTPATIENT)
Dept: NURSING | Facility: CLINIC | Age: 78
End: 2018-04-05
Payer: COMMERCIAL

## 2018-04-05 DIAGNOSIS — Z79.01 LONG-TERM (CURRENT) USE OF ANTICOAGULANTS: ICD-10-CM

## 2018-04-05 DIAGNOSIS — I82.409 DVT (DEEP VENOUS THROMBOSIS) (H): ICD-10-CM

## 2018-04-05 DIAGNOSIS — I26.99 PULMONARY EMBOLISM WITH INFARCTION (H): ICD-10-CM

## 2018-04-05 LAB — INR PPP: 2.4

## 2018-04-05 NOTE — PROGRESS NOTES
ANTICOAGULATION FOLLOW-UP CLINIC VISIT    Patient Name:  Tana Caceres  Date:  4/5/2018  Contact Type:  Telephone    SUBJECTIVE:     Patient Findings     Positives No Problem Findings    Comments S/O:  Rupal from  home care calls with patients INR today of 2.4.  Tana Caceres is on Coumadin for DVT.  Current Coumadin dose is 5 mg daily.   Concerns today are: None Noted.    A/P: Tana Caceres's INR is Therapeutic  for his/her goal range of 2 - 3.  Reasons why INR is out of range may include: na  Recommended to have Tana Caceres remain on the same Coumadin dose and to have his/her INR rechecked in 2 weeks on 4/19.      Marina Mohr RN BC             OBJECTIVE    INR   Date Value Ref Range Status   04/05/2018 2.4  Final       ASSESSMENT / PLAN  No question data found.  Anticoagulation Summary as of 4/5/2018     INR goal 2.0-3.0   Today's INR 2.4   Maintenance plan 5 mg (5 mg x 1) every day   Full instructions 5 mg every day   Weekly total 35 mg   No change documented Marina Mohr RN   Plan last modified Marina Mohr RN (9/22/2017)   Next INR check 4/19/2018   Target end date Indefinite    Indications   DVT (deep venous thrombosis) (H) [I82.409]  Long-term (current) use of anticoagulants [Z79.01] [Z79.01]  Pulmonary embolism with infarction (H) [I26.99] [I26.99]         Anticoagulation Episode Summary     INR check location     Preferred lab     Send INR reminders to [facility]    Comments       Anticoagulation Care Providers     Provider Role Specialty Phone number    Manuel Taylor MD [facility] Internal Medicine [phone]            See the Encounter Report to view Anticoagulation Flowsheet and Dosing Calendar (Go to Encounters tab in chart review, and find the Anticoagulation Therapy Visit)    Dosage adjustment made based on physician directed care plan.    Marina Mohr RN

## 2018-04-09 ENCOUNTER — TELEPHONE (OUTPATIENT)
Dept: INTERNAL MEDICINE | Facility: CLINIC | Age: 78
End: 2018-04-09

## 2018-04-09 NOTE — TELEPHONE ENCOUNTER
Karine with [facility]  stated that they will be discharging patient from  this week d/t patient has plans to travel outside of the country starting the end of the month and will be gone for a month.  They will not be re-certifying patient  She will let patient's son know that she will need to schedule an in clinic INR check that is due on 4/19/18    Dr. Taylor updated.  Routing to INR as FYI  Encounter can be closed out    Bennett Shaw RN

## 2018-04-12 NOTE — TELEPHONE ENCOUNTER
Karine from  Home Care left message on INR VM stating patient is no longer leaving the country and was re certified and will be getting her INR checked at home.    Marina Mohr RN - BC

## 2018-04-19 ENCOUNTER — ANTICOAGULATION THERAPY VISIT (OUTPATIENT)
Dept: NURSING | Facility: CLINIC | Age: 78
End: 2018-04-19
Payer: COMMERCIAL

## 2018-04-19 DIAGNOSIS — Z79.01 LONG-TERM (CURRENT) USE OF ANTICOAGULANTS: ICD-10-CM

## 2018-04-19 DIAGNOSIS — I82.409 DVT (DEEP VENOUS THROMBOSIS) (H): ICD-10-CM

## 2018-04-19 DIAGNOSIS — Z53.9 DIAGNOSIS NOT YET DEFINED: Primary | ICD-10-CM

## 2018-04-19 DIAGNOSIS — I26.99 PULMONARY EMBOLISM WITH INFARCTION (H): ICD-10-CM

## 2018-04-19 LAB — INR PPP: 4.1

## 2018-04-19 PROCEDURE — G0179 MD RECERTIFICATION HHA PT: HCPCS | Performed by: INTERNAL MEDICINE

## 2018-04-19 NOTE — PROGRESS NOTES
ANTICOAGULATION FOLLOW-UP CLINIC VISIT    Patient Name:  Tana Caceres  Date:  4/19/2018  Contact Type:  Telephone    SUBJECTIVE:     Patient Findings     Positives No Problem Findings, Unexplained INR or factor level change    Comments S/O:  Rupal from  home care calls with patients INR today of 4.1.  Tana Caceres is on Coumadin for DVT.  Current Coumadin dose is 5 mg daily.   Concerns today are: None Noted.    A/P: Tana Caceres's INR is Supratherapeutic  for his/her goal range of 2 - 3.  Reasons why INR is out of range may include: only change this week is eating food that has been delivered from her home country.  Recommended to have Tana Caceres decrease Coumadin dose to hold today, 2.5 mg tomorrow then 5 mg daily and to have his/her INR rechecked in 1 week on 4/26.      Marina Mohr RN BC             OBJECTIVE    INR   Date Value Ref Range Status   04/19/2018 4.1  Final       ASSESSMENT / PLAN  No question data found.  Anticoagulation Summary as of 4/19/2018     INR goal 2.0-3.0   Today's INR 4.1!   Maintenance plan 5 mg (5 mg x 1) every day   Full instructions 4/19: Hold; 4/20: 2.5 mg; Otherwise 5 mg every day   Weekly total 35 mg   Plan last modified Marina Mohr, RN (9/22/2017)   Next INR check 4/26/2018   Target end date Indefinite    Indications   DVT (deep venous thrombosis) (H) [I82.409]  Long-term (current) use of anticoagulants [Z79.01] [Z79.01]  Pulmonary embolism with infarction (H) [I26.99] [I26.99]         Anticoagulation Episode Summary     INR check location     Preferred lab     Send INR reminders to [facility]    Comments       Anticoagulation Care Providers     Provider Role Specialty Phone number    Manuel Taylor MD [facility] Internal Medicine [phone]            See the Encounter Report to view Anticoagulation Flowsheet and Dosing Calendar (Go to Encounters tab in chart review, and find the Anticoagulation Therapy Visit)    Dosage adjustment made based on physician directed  care plan.    Marina Mohr RN

## 2018-04-19 NOTE — MR AVS SNAPSHOT
Tana Caceres   4/19/2018   Anticoagulation Therapy Visit    Description:  77 year old female   Provider:  Manuel Taylor MD   Department:  [facility]           INR as of 4/19/2018     Today's INR 4.1!      Anticoagulation Summary as of 4/19/2018     INR goal 2.0-3.0   Today's INR 4.1!   Full instructions 4/19: Hold; 4/20: 2.5 mg; Otherwise 5 mg every day   Next INR check 4/26/2018    Indications   DVT (deep venous thrombosis) (H) [I82.409]  Long-term (current) use of anticoagulants [Z79.01] [Z79.01]  Pulmonary embolism with infarction (H) [I26.99] [I26.99]         Contact Numbers     [facility]  Please call [phone] to cancel and/or reschedule your appointment   Please call [phone] with any problems or questions regarding your therapy.        April 2018 Details    Sun Mon Tue Wed Thu Fri Sat     1               2               3               4               5               6               7                 8               9               10               11               12               13               14                 15               16               17               18               19      Hold   See details      20      2.5 mg         21      5 mg           22      5 mg         23      5 mg         24      5 mg         25      5 mg         26            27               28                 29               30                     Date Details   04/19 This INR check       Date of next INR:  4/26/2018         How to take your warfarin dose     To take:  2.5 mg Take 0.5 of a 5 mg tablet.    To take:  5 mg Take 1 of the 5 mg tablets.    Hold Do not take your warfarin dose. See the Details table to the right for additional instructions.

## 2018-04-26 ENCOUNTER — ANTICOAGULATION THERAPY VISIT (OUTPATIENT)
Dept: NURSING | Facility: CLINIC | Age: 78
End: 2018-04-26
Payer: COMMERCIAL

## 2018-04-26 DIAGNOSIS — I26.99 PULMONARY EMBOLISM WITH INFARCTION (H): ICD-10-CM

## 2018-04-26 DIAGNOSIS — Z79.01 LONG-TERM (CURRENT) USE OF ANTICOAGULANTS: ICD-10-CM

## 2018-04-26 DIAGNOSIS — I82.409 DVT (DEEP VENOUS THROMBOSIS) (H): ICD-10-CM

## 2018-04-26 LAB — INR PPP: 2.7

## 2018-04-26 NOTE — PROGRESS NOTES
ANTICOAGULATION FOLLOW-UP CLINIC VISIT    Patient Name:  Tana Caceres  Date:  4/26/2018  Contact Type:  Telephone    SUBJECTIVE:     Patient Findings     Positives No Problem Findings    Comments S/O:  Manuel from  home care calls with patients INR today of 2.7.  Tana Caceres is on Coumadin for DVT.  Current Coumadin dose is 5 mg daily.   Concerns today are: None Noted.    A/P: Tana Caceres's INR is Therapeutic  for his/her goal range of 2 - 3.  Reasons why INR is out of range may include: na  Recommended to have Tana Caceres remain on the same Coumadin dose and to have his/her INR rechecked in 1 week on 5/3.      Marina Mohr RN BC             OBJECTIVE    INR   Date Value Ref Range Status   04/26/2018 2.7  Final       ASSESSMENT / PLAN  No question data found.  Anticoagulation Summary as of 4/26/2018     INR goal 2.0-3.0   Today's INR 2.7   Maintenance plan 5 mg (5 mg x 1) every day   Full instructions 5 mg every day   Weekly total 35 mg   No change documented Marina Mohr RN   Plan last modified Marina Mohr RN (9/22/2017)   Next INR check 5/3/2018   Target end date Indefinite    Indications   DVT (deep venous thrombosis) (H) [I82.409]  Long-term (current) use of anticoagulants [Z79.01] [Z79.01]  Pulmonary embolism with infarction (H) [I26.99] [I26.99]         Anticoagulation Episode Summary     INR check location     Preferred lab     Send INR reminders to [facility]    Comments       Anticoagulation Care Providers     Provider Role Specialty Phone number    Manule Taylor MD [facility] Internal Medicine [phone]            See the Encounter Report to view Anticoagulation Flowsheet and Dosing Calendar (Go to Encounters tab in chart review, and find the Anticoagulation Therapy Visit)    Dosage adjustment made based on physician directed care plan.    Marina Mohr RN

## 2018-04-26 NOTE — MR AVS SNAPSHOT
Tana Ramosherb   4/26/2018   Anticoagulation Therapy Visit    Description:  77 year old female   Provider:  Manuel Taylor MD   Department:  [facility]           INR as of 4/26/2018     Today's INR 2.7      Anticoagulation Summary as of 4/26/2018     INR goal 2.0-3.0   Today's INR 2.7   Full instructions 5 mg every day   Next INR check 5/3/2018    Indications   DVT (deep venous thrombosis) (H) [I82.409]  Long-term (current) use of anticoagulants [Z79.01] [Z79.01]  Pulmonary embolism with infarction (H) [I26.99] [I26.99]         Contact Numbers     [facility]  Please call [phone] to cancel and/or reschedule your appointment   Please call [phone] with any problems or questions regarding your therapy.        April 2018 Details    Sun Mon Tue Wed Thu Fri Sat     1               2               3               4               5               6               7                 8               9               10               11               12               13               14                 15               16               17               18               19               20               21                 22               23               24               25               26      5 mg   See details      27      5 mg         28      5 mg           29      5 mg         30      5 mg               Date Details   04/26 This INR check               How to take your warfarin dose     To take:  5 mg Take 1 of the 5 mg tablets.           May 2018 Details    Sun Mon Tue Wed Thu Fri Sat       1      5 mg         2      5 mg         3            4               5                 6               7               8               9               10               11               12                 13               14               15               16               17               18               19                 20               21               22               23               24               25                26                 27               28               29               30               31                  Date Details   No additional details    Date of next INR:  5/3/2018         How to take your warfarin dose     To take:  5 mg Take 1 of the 5 mg tablets.

## 2018-05-02 NOTE — PROGRESS NOTES
SUBJECTIVE:   Tana Caceres is a 77 year old female who presents to clinic today for the following health issues:       Need for prophylactic vaccination with tetanus-diphtheria (TD)  Hypertension goal BP (blood pressure) < 140/90  Deep vein thrombosis (DVT) of proximal lower extremity, unspecified chronicity, unspecified laterality (H)  Other chronic pulmonary embolism without acute cor pulmonale (H)  Hyperlipidemia LDL goal <100  Alzheimer's dementia without behavioral disturbance, unspecified timing of dementia onset  Acquired hypothyroidism  OAB (overactive bladder)  Hypovitaminosis D     A patient well known to me. She's an Sinhala woman with wearing a full traditional hijab , here with son who cares for her and a grandson. She's going to be gone out of town apparently for many months, not exactly sure how long. She's got a stable phase of chronic illness except for her blood pressure readings which have gotten out of range . She speaks essentially no English and son acts as     Hypertension Follow-up      Outpatient blood pressures are being checked at home.  Results are unknown.    Low Salt Diet: regular, no restrictions      Amount of exercise or physical activity: 2-3 days/week for an average of 15-30 minutes    Problems taking medications regularly: No    Medication side effects: none    Diet: regular (no restrictions)    BP Readings from Last 6 Encounters:   05/03/18 140/72   12/14/17 138/80   11/03/17 130/66   08/11/17 122/78   06/02/17 137/74   06/01/17 (!) 157/93           Problem list and histories reviewed & adjusted, as indicated.  Additional history: as documented    Patient Active Problem List   Diagnosis     Hypertension goal BP (blood pressure) < 140/90     DVT (deep venous thrombosis) (H)     Acquired hypothyroidism     Factor V Leiden (H)     Unilateral sensorineural hearing loss     Mixed hearing loss     Osteoporosis     History of nephrectomy     Adrenal mass, left (H)     Right  pontine CVA (H)     DO (dyspnea on exertion)     Primary osteoarthritis of knee     Morbid obesity (H)     Physical deconditioning     Dysgeusia     Cataract     LAP-BAND surgery status     Left adrenal mass (H)     Age-related osteoporosis without current pathological fracture     Hyperlipidemia LDL goal <100     Long term current use of anticoagulant therapy     Hyperglobulinemia     History of partial thyroidectomy     Microscopic hematuria     Persistent insomnia     MCI (mild cognitive impairment)     Other chronic pulmonary embolism without acute cor pulmonale (H)     Long-term (current) use of anticoagulants [Z79.01]     Pulmonary embolism with infarction (H) [I26.99]     Past Surgical History:   Procedure Laterality Date     C PARTIAL EXCISION THYROID,BILAT       CATARACT IOL, RT/LT  8/14/2014    Procedure: EXTRACTION, CATARACT, WITH INTRAOCULAR LENS INSERTION, POSTERIOR CHAMBER; Laterality: Right; Surgeon: Eufemia Chatman MD; Service: Ophthalmology     CATARACT IOL, RT/LT  11/10/2014    Procedure: EXTRACTION, CATARACT, WITH INTRAOCULAR LENS INSERTION, POSTERIOR CHAMBER; Laterality: Left; Surgeon: Gulshan Pickering MD; Service: Ophthalmology     NEPHRECTOMY  1980s     SURGICAL HISTORY OF -       R ear surgery     TYMPANOPLASTY  10/14/2014    Procedure: TYMPANOPLASTY; Laterality: Right; Surgeon: Stephen Abarca MD; Service: Otolaryngology     XR LAP BAND      uncertain       Social History   Substance Use Topics     Smoking status: Never Smoker     Smokeless tobacco: Never Used     Alcohol use No     Family History   Problem Relation Age of Onset     Hypertension Father      Glaucoma Father      Hypertension Mother      Hypertension Brother      Glaucoma Brother      Ovarian Cancer No family hx of      Cancer - colorectal No family hx of      Breast Cancer No family hx of          Current Outpatient Prescriptions   Medication Sig Dispense Refill     ACETAMINOPHEN PO Take 500 mg by mouth every 6 hours as  needed for pain       amLODIPine (NORVASC) 5 MG tablet Take 1 tablet (5 mg) by mouth daily 90 tablet 3     aspirin 81 MG tablet Take 1 tablet (81 mg) by mouth daily 90 tablet 3     atorvastatin (LIPITOR) 20 MG tablet Take 1 tablet (20 mg) by mouth daily 90 tablet 3     cholecalciferol (VITAMIN D3) 1000 UNIT tablet Take 1 tablet (1,000 Units) by mouth daily 90 tablet 3     donepezil (ARICEPT) 10 MG tablet Take 1 tablet (10 mg) by mouth At Bedtime 90 tablet 3     Levothyroxine Sodium 75 MCG CAPS Take 0.075 mg by mouth daily 90 capsule 3     lisinopril (PRINIVIL/ZESTRIL) 20 MG tablet Take 1 tablet (20 mg) by mouth daily 90 tablet 3     order for DME Equipment being ordered: wall bars with screws, no suction 1 Device 0     oxybutynin (DITROPAN-XL) 10 MG 24 hr tablet TAKE TWO TABLETS BY MOUTH DAILY. 180 tablet 3     warfarin (COUMADIN) 5 MG tablet Take 1 tablet (5 mg) by mouth daily 90 tablet 3     [DISCONTINUED] amLODIPine (NORVASC) 5 MG tablet Take 1 tablet (5 mg) by mouth daily 90 tablet 3     [DISCONTINUED] atorvastatin (LIPITOR) 20 MG tablet Take 1 tablet (20 mg) by mouth daily 90 tablet 3     [DISCONTINUED] Levothyroxine Sodium 75 MCG CAPS Take 0.075 mg by mouth daily 90 capsule 3     [DISCONTINUED] warfarin (COUMADIN) 5 MG tablet Take 1 tablet (5 mg) by mouth daily 90 tablet 3     No Known Allergies  Recent Labs   Lab Test  12/01/17   1417  01/12/17   1240   LDL  78  74   HDL  74  71   TRIG  73  56   ALT  22  23   CR  0.69  0.69   GFRESTIMATED  82  82   GFRESTBLACK  >90  >90  African American GFR Calc     POTASSIUM  4.7  4.1   TSH  1.06  1.72      BP Readings from Last 3 Encounters:   05/03/18 140/72   12/14/17 138/80   11/03/17 130/66    Wt Readings from Last 3 Encounters:   05/03/18 153 lb (69.4 kg)   12/14/17 147 lb 3.2 oz (66.8 kg)   11/03/17 151 lb (68.5 kg)                  Labs reviewed in EPIC    Reviewed and updated as needed this visit by clinical staff       Reviewed and updated as needed this visit by  "Provider         ROS:  Constitutional, HEENT, cardiovascular, pulmonary, gi and gu systems are negative, except as otherwise noted.    OBJECTIVE:                                                    /72  Pulse 70  Temp 97.7  F (36.5  C) (Oral)  Resp 16  Ht 4' 7.5\" (1.41 m)  Wt 153 lb (69.4 kg)  SpO2 96%  BMI 34.92 kg/m2  Body mass index is 34.92 kg/(m^2).  GENERAL APPEARANCE: healthy, alert and no distress  EYES: Eyes grossly normal to inspection, PERRL and conjunctivae and sclerae normal  NEURO: Normal strength and tone, mentation intact and speech normal  PSYCH: mentation appears normal and affect normal/bright    Diagnostic test results:  Diagnostic Test Results:  No orders of the defined types were placed in this encounter.         ASSESSMENT/PLAN:                                                    1. Need for prophylactic vaccination with tetanus-diphtheria (TD)  Declines     2. Hypertension goal BP (blood pressure) < 140/90  We adjusted her medication just a little bit. She was on a single daily dose of enalapril (Vasotec) which is not appropriate ; we could have done a double up on the dose but instead we switched her over to lisinopril . Recommended medical assistant blood pressure recheck  In 2-4 weeks  - amLODIPine (NORVASC) 5 MG tablet; Take 1 tablet (5 mg) by mouth daily  Dispense: 90 tablet; Refill: 3  - lisinopril (PRINIVIL/ZESTRIL) 20 MG tablet; Take 1 tablet (20 mg) by mouth daily  Dispense: 90 tablet; Refill: 3    3. Deep vein thrombosis (DVT) of proximal lower extremity, unspecified chronicity, unspecified laterality (H)  Continue current plan of care   With INR / coumadin monitoring   - aspirin 81 MG tablet; Take 1 tablet (81 mg) by mouth daily  Dispense: 90 tablet; Refill: 3  - warfarin (COUMADIN) 5 MG tablet; Take 1 tablet (5 mg) by mouth daily  Dispense: 90 tablet; Refill: 3    4. Other chronic pulmonary embolism without acute cor pulmonale (H)  As above   - aspirin 81 MG tablet; " Take 1 tablet (81 mg) by mouth daily  Dispense: 90 tablet; Refill: 3  - warfarin (COUMADIN) 5 MG tablet; Take 1 tablet (5 mg) by mouth daily  Dispense: 90 tablet; Refill: 3    5. Hyperlipidemia LDL goal <100  Continue current plan of care     - atorvastatin (LIPITOR) 20 MG tablet; Take 1 tablet (20 mg) by mouth daily  Dispense: 90 tablet; Refill: 3    6. Alzheimer's dementia without behavioral disturbance, unspecified timing of dementia onset  Continue current plan of care     - donepezil (ARICEPT) 10 MG tablet; Take 1 tablet (10 mg) by mouth At Bedtime  Dispense: 90 tablet; Refill: 3    7. Acquired hypothyroidism  Continue current plan of care   , not due for laboratory studies today   - Levothyroxine Sodium 75 MCG CAPS; Take 0.075 mg by mouth daily  Dispense: 90 capsule; Refill: 3    8. OAB (overactive bladder)  Continue current plan of care     - oxybutynin (DITROPAN-XL) 10 MG 24 hr tablet; TAKE TWO TABLETS BY MOUTH DAILY.  Dispense: 180 tablet; Refill: 3    9. Hypovitaminosis D  Continue current plan of care     - cholecalciferol (VITAMIN D3) 1000 UNIT tablet; Take 1 tablet (1,000 Units) by mouth daily  Dispense: 90 tablet; Refill: 3      Follow up with Provider - 3-6 months recommended but could go for as long as a year      Manuel Taylor MD  [facility]

## 2018-05-03 ENCOUNTER — OFFICE VISIT (OUTPATIENT)
Dept: FAMILY MEDICINE | Facility: CLINIC | Age: 78
End: 2018-05-03
Payer: COMMERCIAL

## 2018-05-03 VITALS
RESPIRATION RATE: 16 BRPM | WEIGHT: 153 LBS | DIASTOLIC BLOOD PRESSURE: 72 MMHG | HEIGHT: 56 IN | TEMPERATURE: 97.7 F | OXYGEN SATURATION: 96 % | SYSTOLIC BLOOD PRESSURE: 140 MMHG | HEART RATE: 70 BPM | BODY MASS INDEX: 34.42 KG/M2

## 2018-05-03 DIAGNOSIS — Z23 NEED FOR PROPHYLACTIC VACCINATION WITH TETANUS-DIPHTHERIA (TD): Primary | ICD-10-CM

## 2018-05-03 DIAGNOSIS — N32.81 OAB (OVERACTIVE BLADDER): ICD-10-CM

## 2018-05-03 DIAGNOSIS — I27.82 OTHER CHRONIC PULMONARY EMBOLISM WITHOUT ACUTE COR PULMONALE (H): ICD-10-CM

## 2018-05-03 DIAGNOSIS — E03.9 ACQUIRED HYPOTHYROIDISM: ICD-10-CM

## 2018-05-03 DIAGNOSIS — E78.5 HYPERLIPIDEMIA LDL GOAL <100: ICD-10-CM

## 2018-05-03 DIAGNOSIS — F02.80 ALZHEIMER'S DEMENTIA WITHOUT BEHAVIORAL DISTURBANCE, UNSPECIFIED TIMING OF DEMENTIA ONSET: ICD-10-CM

## 2018-05-03 DIAGNOSIS — I82.4Y9 DEEP VEIN THROMBOSIS (DVT) OF PROXIMAL LOWER EXTREMITY, UNSPECIFIED CHRONICITY, UNSPECIFIED LATERALITY (H): ICD-10-CM

## 2018-05-03 DIAGNOSIS — G30.9 ALZHEIMER'S DEMENTIA WITHOUT BEHAVIORAL DISTURBANCE, UNSPECIFIED TIMING OF DEMENTIA ONSET: ICD-10-CM

## 2018-05-03 DIAGNOSIS — I10 HYPERTENSION GOAL BP (BLOOD PRESSURE) < 140/90: ICD-10-CM

## 2018-05-03 DIAGNOSIS — E55.9 HYPOVITAMINOSIS D: ICD-10-CM

## 2018-05-03 PROCEDURE — 99214 OFFICE O/P EST MOD 30 MIN: CPT | Performed by: INTERNAL MEDICINE

## 2018-05-03 RX ORDER — LISINOPRIL 20 MG/1
20 TABLET ORAL DAILY
Qty: 90 TABLET | Refills: 3 | Status: SHIPPED | OUTPATIENT
Start: 2018-05-03

## 2018-05-03 RX ORDER — LEVOTHYROXINE SODIUM 75 UG/1
75 CAPSULE ORAL DAILY
Qty: 90 CAPSULE | Refills: 3 | Status: SHIPPED | OUTPATIENT
Start: 2018-05-03

## 2018-05-03 RX ORDER — DONEPEZIL HYDROCHLORIDE 10 MG/1
10 TABLET, FILM COATED ORAL AT BEDTIME
Qty: 90 TABLET | Refills: 3 | Status: SHIPPED | OUTPATIENT
Start: 2018-05-03

## 2018-05-03 RX ORDER — AMLODIPINE BESYLATE 5 MG/1
5 TABLET ORAL DAILY
Qty: 90 TABLET | Refills: 3 | Status: SHIPPED | OUTPATIENT
Start: 2018-05-03

## 2018-05-03 RX ORDER — WARFARIN SODIUM 5 MG/1
5 TABLET ORAL DAILY
Qty: 90 TABLET | Refills: 3 | Status: SHIPPED | OUTPATIENT
Start: 2018-05-03

## 2018-05-03 RX ORDER — OXYBUTYNIN CHLORIDE 10 MG/1
TABLET, EXTENDED RELEASE ORAL
Qty: 180 TABLET | Refills: 3 | Status: SHIPPED | OUTPATIENT
Start: 2018-05-03

## 2018-05-03 RX ORDER — ATORVASTATIN CALCIUM 20 MG/1
20 TABLET, FILM COATED ORAL DAILY
Qty: 90 TABLET | Refills: 3 | Status: SHIPPED | OUTPATIENT
Start: 2018-05-03

## 2018-05-03 NOTE — PATIENT INSTRUCTIONS
[facility]    If you have any questions regarding to your visit please contact your care team:     Team Pink:   Clinic Hours Telephone Number   Internal Medicine:  Dr. Pat Styles NP       7am-7pm  Monday - Thursday   7am-5pm  Fridays  [phone]  (Appointment scheduling available 24/7)    Questions about your recent visit?  Team Line  [phone]   Urgent Care - [place] [facility] - 11am-9pm Monday-Friday Saturday-Sunday- 9am-5pm   [place] - 5pm-9pm Monday-Friday Saturday-Sunday- 9am-5pm  [phone] - [place]  [phone] - [place]       What options do I have for a visit other than an office visit? We offer electronic visits (e-visits) and telephone visits, when medically appropriate.  Please check with your medical insurance to see if these types of visits are covered, as you will be responsible for any charges that are not paid by your insurance.      You can use [employer] (secure electronic communication) to access to your chart, send your primary care provider a message, or make an appointment. Ask a team member how to get started.     For a price quote for your services, please call our Consumer Price Line at [phone] or our Imaging Cost estimation line at [phone] (for imaging tests).  Bianca GAY CMA ([facility])

## 2018-05-03 NOTE — MR AVS SNAPSHOT
After Visit Summary   5/3/2018    Tana Caceres    MRN: [id number]           Patient Information     Date Of Birth          1940        Visit Information        Provider Department      5/3/2018 5:10 PM Manuel Taylor MD [facility]        Today's Diagnoses     Need for prophylactic vaccination with tetanus-diphtheria (TD)    -  1    Hypertension goal BP (blood pressure) < 140/90        Deep vein thrombosis (DVT) of proximal lower extremity, unspecified chronicity, unspecified laterality (H)        Other chronic pulmonary embolism without acute cor pulmonale (H)        Hyperlipidemia LDL goal <100        Alzheimer's dementia without behavioral disturbance, unspecified timing of dementia onset        Acquired hypothyroidism        OAB (overactive bladder)        Hypovitaminosis D          Care Instructions    [facility]    If you have any questions regarding to your visit please contact your care team:     Team Pink:   Clinic Hours Telephone Number   Internal Medicine:  Dr. Pat Styles NP       7am-7pm  Monday - Thursday   7am-5pm  Fridays  [phone]  (Appointment scheduling available 24/7)    Questions about your recent visit?  Team Line  [phone]   [facility] - [place] [facility] - 11am-9pm Monday-Friday Saturday-Sunday- 9am-5pm   [place] - 5pm-9pm Monday-Friday Saturday-Sunday- 9am-5pm  [phone] - [place]  [phone] - [place]       What options do I have for a visit other than an office visit? We offer electronic visits (e-visits) and telephone visits, when medically appropriate.  Please check with your medical insurance to see if these types of visits are covered, as you will be responsible for any charges that are not paid by your insurance.      You can use [employer] (secure electronic communication) to access to your chart, send your primary care provider a message, or make an  "appointment. Ask a team member how to get started.     For a price quote for your services, please call our Consumer Price Line at [phone] or our Imaging Cost estimation line at [phone] (for imaging tests).  Bianca GAY CMA ([facility])            Follow-ups after your visit        Who to contact     If you have questions or need follow up information about today's clinic visit or your schedule please contact [facility] directly at [phone].  Normal or non-critical lab and imaging results will be communicated to you by [employer], letter or phone within 4 business days after the clinic has received the results. If you do not hear from us within 7 days, please contact the clinic through [employer] or phone. If you have a critical or abnormal lab result, we will notify you by phone as soon as possible.  Submit refill requests through [employer] or call your pharmacy and they will forward the refill request to us. Please allow 3 business days for your refill to be completed.          Additional Information About Your Visit        [employer] Information     [employer] gives you secure access to your electronic health record. If you see a primary care provider, you can also send messages to your care team and make appointments. If you have questions, please call your primary care clinic.  If you do not have a primary care provider, please call [phone] and they will assist you.        Care EveryWhere ID     This is your Care EveryWhere ID. This could be used by other organizations to access your [place] medical records  [id number]        Your Vitals Were     Pulse Temperature Respirations Height Pulse Oximetry BMI (Body Mass Index)    70 97.7  F (36.5  C) (Oral) 16 4' 7.5\" (1.41 m) 96% 34.92 kg/m2       Blood Pressure from Last 3 Encounters:   05/03/18 140/72   12/14/17 138/80   11/03/17 130/66    Weight from Last 3 Encounters:   05/03/18 153 lb (69.4 kg)   12/14/17 147 lb 3.2 oz (66.8 kg)   11/03/17 151 lb " (68.5 kg)              Today, you had the following     No orders found for display         Today's Medication Changes          These changes are accurate as of 5/3/18  5:42 PM.  If you have any questions, ask your nurse or doctor.               Start taking these medicines.        Dose/Directions    lisinopril 20 MG tablet   Commonly known as:  PRINIVIL/ZESTRIL   Used for:  Hypertension goal BP (blood pressure) < 140/90   Started by:  Manuel Taylor MD        Dose:  20 mg   Take 1 tablet (20 mg) by mouth daily   Quantity:  90 tablet   Refills:  3         These medicines have changed or have updated prescriptions.        Dose/Directions    cholecalciferol 1000 UNIT tablet   Commonly known as:  vitamin D3   This may have changed:  See the new instructions.   Used for:  Hypovitaminosis D   Changed by:  Manuel Taylor MD        Dose:  1000 Units   Take 1 tablet (1,000 Units) by mouth daily   Quantity:  90 tablet   Refills:  3         Stop taking these medicines if you haven't already. Please contact your care team if you have questions.     enalapril 20 MG tablet   Commonly known as:  VASOTEC   Stopped by:  Manuel Taylor MD           polyethylene glycol powder   Commonly known as:  MIRALAX   Stopped by:  Manuel Taylor MD                Where to get your medicines      These medications were sent to [id number] - [place], [address] [place] [address]     Phone:  [phone]     amLODIPine 5 MG tablet    aspirin 81 MG tablet    atorvastatin 20 MG tablet    cholecalciferol 1000 UNIT tablet    donepezil 10 MG tablet    Levothyroxine Sodium 75 MCG Caps    lisinopril 20 MG tablet    oxybutynin 10 MG 24 hr tablet    warfarin 5 MG tablet                Primary Care Provider Office Phone # Fax #    Manuel Taylor [phone]       [address] [facility] [address]        Equal Access to Services     HAROON BLANCHARD AH: christine Spencer,  kat goldman andreyro najera ah. So [facility] [phone].    ATENCIÓN: Si jessy ackerman, tiene a akhtar disposición servicios gratuitos de asistencia lingüística. Justin al [phone].    We comply with applicable federal civil rights laws and [place] laws. We do not discriminate on the basis of race, color, national origin, age, disability, sex, sexual orientation, or gender identity.            Thank you!     Thank you for choosing [facility]  for your care. Our goal is always to provide you with excellent care. Hearing back from our patients is one way we can continue to improve our services. Please take a few minutes to complete the written survey that you may receive in the mail after your visit with us. Thank you!             Your Updated Medication List - Protect others around you: Learn how to safely use, store and throw away your medicines at www.disposemymeds.org.          This list is accurate as of 5/3/18  5:42 PM.  Always use your most recent med list.                   Brand Name Dispense Instructions for use Diagnosis    ACETAMINOPHEN PO      Take 500 mg by mouth every 6 hours as needed for pain        amLODIPine 5 MG tablet    NORVASC    90 tablet    Take 1 tablet (5 mg) by mouth daily    Hypertension goal BP (blood pressure) < 140/90       aspirin 81 MG tablet     90 tablet    Take 1 tablet (81 mg) by mouth daily    Deep vein thrombosis (DVT) of proximal lower extremity, unspecified chronicity, unspecified laterality (H), Other chronic pulmonary embolism without acute cor pulmonale (H)       atorvastatin 20 MG tablet    LIPITOR    90 tablet    Take 1 tablet (20 mg) by mouth daily    Hyperlipidemia LDL goal <100       cholecalciferol 1000 UNIT tablet    vitamin D3    90 tablet    Take 1 tablet (1,000 Units) by mouth daily    Hypovitaminosis D       donepezil 10 MG tablet    ARICEPT    90 tablet    Take 1 tablet (10 mg) by mouth At Bedtime    Alzheimer's  dementia without behavioral disturbance, unspecified timing of dementia onset       Levothyroxine Sodium 75 MCG Caps     90 capsule    Take 0.075 mg by mouth daily    Acquired hypothyroidism       lisinopril 20 MG tablet    PRINIVIL/ZESTRIL    90 tablet    Take 1 tablet (20 mg) by mouth daily    Hypertension goal BP (blood pressure) < 140/90       order for DME     1 Device    Equipment being ordered: wall bars with screws, no suction        oxybutynin 10 MG 24 hr tablet    DITROPAN-XL    180 tablet    TAKE TWO TABLETS BY MOUTH DAILY.    OAB (overactive bladder)       warfarin 5 MG tablet    COUMADIN    90 tablet    Take 1 tablet (5 mg) by mouth daily    Deep vein thrombosis (DVT) of proximal lower extremity, unspecified chronicity, unspecified laterality (H), Other chronic pulmonary embolism without acute cor pulmonale (H)

## 2018-05-04 ENCOUNTER — TELEPHONE (OUTPATIENT)
Dept: FAMILY MEDICINE | Facility: CLINIC | Age: 78
End: 2018-05-04

## 2018-05-04 NOTE — TELEPHONE ENCOUNTER
See 1/25/18 phone encounter  Alere monitor was previously discussed and form filled     Bennett Shaw RN

## 2018-05-04 NOTE — TELEPHONE ENCOUNTER
Sotero called RN hotline asking about message below.  He states that mother receives INRs with home care so he does not understand.  Patient is leaving wed or Thursday.  advised son INR nurse would f/u on Monday.   Moriah Rucker RN

## 2018-05-04 NOTE — TELEPHONE ENCOUNTER
I admit to some degree of confusion here. I did discuss with hiro Sotero who speaks for his mother that we cannot have a patient taking coumadin with an unmonitored INR. The risks outweigh the benefits with unmonitored coumadin .    I am uncertain what son proposes. I think the [employer] INRatio  2 PT/INR Monitor would be an option. Isn't this something we could set up patient with ?    Manuel Taylor MD

## 2018-05-04 NOTE — TELEPHONE ENCOUNTER
Reason for Call:  Other call back    Detailed comments: Sotero states he is requesting a new referral for INR monitor for his mother. There was an attempt to send this long time ago but states it did not happen as the referral was so a company in [place] and they could not distribute in [place]. They would like a referral for INR monitor that distributes in [place], states Dr. Taylor is aware.     [facility]acy: [facility]    Mother is going out of town soon. ASAP per son.     Phone Number Patient can be reached at: Home number on file [phone] (home)    Best Time: any    Can we leave a detailed message on this number? YES    Call taken on 5/4/2018 at 10:02 AM by Escobar Robledo

## 2018-05-04 NOTE — TELEPHONE ENCOUNTER
- It states you are aware of his request. Are they wanting the actual machine to check INR at home?  Not available that I know of. Would need your okay and appt with INR nurse for teaching.  Marianne Lockhart RN

## 2018-05-07 NOTE — TELEPHONE ENCOUNTER
Spoke with Son, Sotero. He wanted to know if he could just buy the INR monitoring machine at a pharmacy. Advised that that is not an option. We have sent the info to [email or web address] and [employer] and they are both not contracted with Medicaid, pt's insurance. Asked the representative from [employer] if they knew who would be covered with pt's insurance and representative recommended that family calls medicaid to see who would be in network. Son wanted to know how much the machines cost to pay out of pocket. Advised that they cost atleast a couple of thousand. Son could maybe afford $500-600 but not if $2,000 or more. Son is going to contact pt's insurance and call back.    Jazmine Moreno RN  [facility]

## 2018-05-09 ENCOUNTER — TELEPHONE (OUTPATIENT)
Dept: NURSING | Facility: CLINIC | Age: 78
End: 2018-05-09

## 2018-05-09 ENCOUNTER — NURSE TRIAGE (OUTPATIENT)
Dept: NURSING | Facility: CLINIC | Age: 78
End: 2018-05-09

## 2018-05-09 LAB — INR PPP: 3.3

## 2018-05-09 NOTE — TELEPHONE ENCOUNTER
No INR from [facility] on 5/3. Called [facility] and spoke with Tori. Pt had a visit scheduled for 5/3, but was missed. Noted that pt had an appt with PCP on 5/3. Gave verbal ok for INR to be done today or tomorrow. Advised to call [phone] after 11 today as writer will not be in clinic after 11 today.    Jazmine Moreno RN  [facility]

## 2018-05-09 NOTE — TELEPHONE ENCOUNTER
[facility] [facility] nurse left message on INR clinic hotline with INR results from today  INR was 3.3    Please call patient's son, Sotero at [phone] with dosing instruction or call Shyanne back at [phone]    Ok to leave a detailed message on shyanne's VM with dosing instructions    INR nurse was out at 1100.  Provider to please dose      Bennett Shaw RN

## 2018-05-10 ENCOUNTER — ANTICOAGULATION THERAPY VISIT (OUTPATIENT)
Dept: FAMILY MEDICINE | Facility: CLINIC | Age: 78
End: 2018-05-10
Payer: COMMERCIAL

## 2018-05-10 DIAGNOSIS — I26.99 PULMONARY EMBOLISM WITH INFARCTION (H): ICD-10-CM

## 2018-05-10 DIAGNOSIS — I82.409 DVT (DEEP VENOUS THROMBOSIS) (H): ICD-10-CM

## 2018-05-10 DIAGNOSIS — Z79.01 LONG-TERM (CURRENT) USE OF ANTICOAGULANTS: ICD-10-CM

## 2018-05-10 PROCEDURE — 99207 ZZC NO CHARGE NURSE ONLY: CPT | Performed by: INTERNAL MEDICINE

## 2018-05-10 NOTE — TELEPHONE ENCOUNTER
Left message for Son, Sotero stating to continue taking 5mg daily. Call and let us know if she has had any issues with bleeding, nose bleeds, bleeding gums, bruising, or any other concerns and let us know if she took her dose last night.     Jazmine Moreno RN  [facility] [place]

## 2018-05-10 NOTE — MR AVS SNAPSHOT
Tana Ramosherb   5/10/2018   Anticoagulation Therapy Visit    Description:  77 year old female   Provider:  Manuel Taylor MD   Department:   Family Practice           INR as of 5/10/2018     Today's INR 3.3! (5/9/2018)      Anticoagulation Summary as of 5/10/2018     INR goal 2.0-3.0   Today's INR 3.3! (5/9/2018)   Full instructions 5 mg every day   Next INR check 5/16/2018    Indications   DVT (deep venous thrombosis) (H) [I82.409]  Long-term (current) use of anticoagulants [Z79.01] [Z79.01]  Pulmonary embolism with infarction (H) [I26.99] [I26.99]         May 2018 Details    Sun Mon Tue Wed Thu Fri Sat       1               2               3               4               5                 6               7               8               9               10      5 mg   See details      11      5 mg         12      5 mg           13      5 mg         14      5 mg         15      5 mg         16            17               18               19                 20               21               22               23               24               25               26                 27               28               29               30               31                  Date Details   05/10 This INR check       Date of next INR:  5/16/2018         How to take your warfarin dose     To take:  5 mg Take 1 of the 5 mg tablets.

## 2018-05-10 NOTE — PROGRESS NOTES
ANTICOAGULATION FOLLOW-UP CLINIC VISIT    Patient Name:  Tana Caceres  Date:  5/10/2018  Contact Type:  Telephone    SUBJECTIVE:     Patient Findings     Positives No Problem Findings    Comments S/O:  Shyanne from  home care calls with patients INR today of 3.3.  Tana Caceres is on Coumadin for DVT and PE.  Current Coumadin dose is 5mg daily.   Concerns today are: None Noted.    A/P: Tana Caceres's INR is Supratherapeutic  for his/her goal range of 2 - 3.  Reasons why INR is out of range may include: NA  Recommended to have Tana Caceres remain on the same Coumadin dose and to have his/her INR rechecked in 1 week on 5/16/18.    Jazmine Moreno RN  [facility]             OBJECTIVE    INR   Date Value Ref Range Status   05/09/2018 3.3  Final       ASSESSMENT / PLAN  INR assessment SUPRA    Recheck INR In: 1 WEEK    INR Location [facility]      Anticoagulation Summary as of 5/10/2018     INR goal 2.0-3.0   Today's INR 3.3! (5/9/2018)   Maintenance plan 5 mg (5 mg x 1) every day   Full instructions 5 mg every day   Weekly total 35 mg   No change documented Jazmine Moreno RN   Plan last modified Marina Mohr RN (9/22/2017)   Next INR check 5/16/2018   Target end date Indefinite    Indications   DVT (deep venous thrombosis) (H) [I82.409]  Long-term (current) use of anticoagulants [Z79.01] [Z79.01]  Pulmonary embolism with infarction (H) [I26.99] [I26.99]         Anticoagulation Episode Summary     INR check location     Preferred lab     Send INR reminders to [facility]    Comments       Anticoagulation Care Providers     Provider Role Specialty Phone number    Manuel Taylor MD [facility] Internal Medicine [phone]            See the Encounter Report to view Anticoagulation Flowsheet and Dosing Calendar (Go to Encounters tab in chart review, and find the Anticoagulation Therapy Visit)    Dosage adjustment made based on physician directed care plan.    Jazmine Moreno RN

## 2018-05-10 NOTE — TELEPHONE ENCOUNTER
Please see anticoagulation note. Called and left message for Shyanne with dosing instructions.    Jazmine Moreno RN  [facility]

## 2018-05-10 NOTE — TELEPHONE ENCOUNTER
"  Reason for Disposition    Caller has URGENT medication question about med that PCP prescribed and triager unable to answer question    Additional Information    Negative: Drug overdose and nurse unable to answer question    Negative: Caller requesting information not related to medicine    Negative: Caller requesting a prescription for Strep throat and has a positive culture result    Negative: Rash while taking a medication or within 3 days of stopping it    Negative: Immunization reaction suspected    Negative: [1] Asthma and [2] having symptoms of asthma (cough, wheezing, etc)    Negative: MORE THAN A DOUBLE DOSE of a prescription or over-the-counter (OTC) drug    Negative: [1] DOUBLE DOSE (an extra dose or lesser amount) of over-the-counter (OTC) drug AND [2] any symptoms (e.g., dizziness, nausea, pain, sleepiness)    Negative: [1] DOUBLE DOSE (an extra dose or lesser amount) of prescription drug AND [2] any symptoms (e.g., dizziness, nausea, pain, sleepiness)    Negative: Took another person's prescription drug    Negative: [1] DOUBLE DOSE (an extra dose or lesser amount) of prescription drug AND [2] NO symptoms (Exception: a double dose of antibiotics)    Negative: Diabetes drug error or overdose (e.g., insulin or extra dose)    Negative: [1] Request for URGENT new prescription or refill of \"essential\" medication (i.e., likelihood of harm to patient if not taken) AND [2] triager unable to fill per unit policy    Negative: [1] Prescription not at pharmacy AND [2] was prescribed today by PCP    Negative: Pharmacy calling with prescription questions and triager unable to answer question    Protocols used: MEDICATION QUESTION CALL-ADULT-SHARON    Radha ( [facility] nurse) calls and says that she needs a Coumadin order, for pt. Pt's INR = 3.3. Dr. Page[facility]-was then paged, to call Radha, at: [phone].  "

## 2018-05-10 NOTE — TELEPHONE ENCOUNTER
What is the exact current dosing ? Once I see that I can pick the new dose. I will be planning to reduce most likely the current weekly dosing by a tiny amount 5-10% at the very most     Manuel Taylor MD

## 2018-05-18 NOTE — TELEPHONE ENCOUNTER
Rupal from [facility] called to report she was unable to obtain INR on 5/17/18. Was informed by patient's son that she has gone to [place]. Unsure if patient will return to the [facility].     Marina Mohr RN - BC

## 2018-06-27 ENCOUNTER — ANTICOAGULATION THERAPY VISIT (OUTPATIENT)
Dept: NURSING | Facility: CLINIC | Age: 78
End: 2018-06-27

## 2018-06-27 DIAGNOSIS — Z79.01 LONG-TERM (CURRENT) USE OF ANTICOAGULANTS: ICD-10-CM

## 2018-06-27 DIAGNOSIS — I26.99 PULMONARY EMBOLISM WITH INFARCTION (H): ICD-10-CM

## 2018-06-27 DIAGNOSIS — I82.409 DVT (DEEP VENOUS THROMBOSIS) (H): ICD-10-CM

## 2018-06-27 NOTE — TELEPHONE ENCOUNTER
LOV -6-     FUV- none scheduled    Last refilled- Pramipexole 0.25 mg tablet- Take 1 tablet by mouth 3 times daily.  6- #90 with 0 refills    Dr. Fulton- No follow up visit is scheduled.  Would you like to have patient schedule a follow up?  Ok to refill Pramipexole 0.5 mg?    Please Advise     Spoke with Son, Sotero. Patient will not be moving back to [place].     Marina Mohr RN - BC

## 2020-03-02 ENCOUNTER — HEALTH MAINTENANCE LETTER (OUTPATIENT)
Age: 80
End: 2020-03-02

## 2020-12-20 ENCOUNTER — HEALTH MAINTENANCE LETTER (OUTPATIENT)
Age: 80
End: 2020-12-20

## 2021-03-11 NOTE — MR AVS SNAPSHOT
Tana Ramosherb   4/5/2018   Anticoagulation Therapy Visit    Description:  77 year old female   Provider:  Manuel Taylor MD   Department:  [facility]           INR as of 4/5/2018     Today's INR 2.4      Anticoagulation Summary as of 4/5/2018     INR goal 2.0-3.0   Today's INR 2.4   Full instructions 5 mg every day   Next INR check 4/19/2018    Indications   DVT (deep venous thrombosis) (H) [I82.409]  Long-term (current) use of anticoagulants [Z79.01] [Z79.01]  Pulmonary embolism with infarction (H) [I26.99] [I26.99]         Contact Numbers     [facility]  Please call [phone] to cancel and/or reschedule your appointment   Please call [phone] with any problems or questions regarding your therapy.        April 2018 Details    Sun Mon Tue Wed Thu Fri Sat     1               2               3               4               5      5 mg   See details      6      5 mg         7      5 mg           8      5 mg         9      5 mg         10      5 mg         11      5 mg         12      5 mg         13      5 mg         14      5 mg           15      5 mg         16      5 mg         17      5 mg         18      5 mg         19            20               21                 22               23               24               25               26               27               28                 29               30                     Date Details   04/05 This INR check       Date of next INR:  4/19/2018         How to take your warfarin dose     To take:  5 mg Take 1 of the 5 mg tablets.            no

## 2021-04-24 ENCOUNTER — HEALTH MAINTENANCE LETTER (OUTPATIENT)
Age: 81
End: 2021-04-24

## 2021-10-03 ENCOUNTER — HEALTH MAINTENANCE LETTER (OUTPATIENT)
Age: 81
End: 2021-10-03

## 2022-05-15 ENCOUNTER — HEALTH MAINTENANCE LETTER (OUTPATIENT)
Age: 82
End: 2022-05-15

## 2022-09-10 ENCOUNTER — HEALTH MAINTENANCE LETTER (OUTPATIENT)
Age: 82
End: 2022-09-10

## 2023-06-03 ENCOUNTER — HEALTH MAINTENANCE LETTER (OUTPATIENT)
Age: 83
End: 2023-06-03
